# Patient Record
Sex: FEMALE | Race: WHITE | Employment: FULL TIME | URBAN - METROPOLITAN AREA
[De-identification: names, ages, dates, MRNs, and addresses within clinical notes are randomized per-mention and may not be internally consistent; named-entity substitution may affect disease eponyms.]

---

## 2020-02-26 ENCOUNTER — HOSPITAL ENCOUNTER (OUTPATIENT)
Dept: NUCLEAR MEDICINE | Age: 65
Discharge: HOME | End: 2020-02-26
Payer: COMMERCIAL

## 2020-02-26 ENCOUNTER — HOSPITAL ENCOUNTER (OUTPATIENT)
Dept: NON INVASIVE DIAGNOSTICS | Age: 65
Discharge: HOME | End: 2020-02-26
Payer: COMMERCIAL

## 2020-02-26 LAB
LV EF: 64 %
LV EF: 71 %
LVEF MODALITY: NORMAL
LVEF MODALITY: NORMAL

## 2020-02-26 PROCEDURE — 3430000000 HC RX DIAGNOSTIC RADIOPHARMACEUTICAL: Performed by: RADIOLOGY

## 2020-02-26 PROCEDURE — 78452 HT MUSCLE IMAGE SPECT MULT: CPT

## 2020-02-26 PROCEDURE — A9500 TC99M SESTAMIBI: HCPCS | Performed by: RADIOLOGY

## 2020-02-26 PROCEDURE — 93017 CV STRESS TEST TRACING ONLY: CPT

## 2020-02-26 PROCEDURE — 93306 TTE W/DOPPLER COMPLETE: CPT

## 2020-02-26 RX ADMIN — Medication 30 MILLICURIE: at 13:38

## 2020-02-26 RX ADMIN — Medication 10 MILLICURIE: at 11:01

## 2024-11-01 ENCOUNTER — APPOINTMENT (OUTPATIENT)
Dept: CARDIOLOGY | Facility: HOSPITAL | Age: 69
End: 2024-11-01
Payer: MEDICARE

## 2024-11-01 ENCOUNTER — APPOINTMENT (OUTPATIENT)
Dept: RADIOLOGY | Facility: HOSPITAL | Age: 69
End: 2024-11-01
Payer: MEDICARE

## 2024-11-01 ENCOUNTER — HOSPITAL ENCOUNTER (EMERGENCY)
Facility: HOSPITAL | Age: 69
Discharge: HOME | End: 2024-11-01
Attending: EMERGENCY MEDICINE
Payer: MEDICARE

## 2024-11-01 VITALS
OXYGEN SATURATION: 99 % | TEMPERATURE: 98.3 F | WEIGHT: 104 LBS | HEIGHT: 64 IN | DIASTOLIC BLOOD PRESSURE: 81 MMHG | RESPIRATION RATE: 18 BRPM | BODY MASS INDEX: 17.75 KG/M2 | SYSTOLIC BLOOD PRESSURE: 117 MMHG | HEART RATE: 75 BPM

## 2024-11-01 DIAGNOSIS — E87.6 HYPOKALEMIA: ICD-10-CM

## 2024-11-01 DIAGNOSIS — R20.2 PARESTHESIA: ICD-10-CM

## 2024-11-01 DIAGNOSIS — E86.0 DEHYDRATION, MODERATE: Primary | ICD-10-CM

## 2024-11-01 LAB
ALBUMIN SERPL BCP-MCNC: 4.2 G/DL (ref 3.4–5)
ALP SERPL-CCNC: 76 U/L (ref 33–136)
ALT SERPL W P-5'-P-CCNC: 14 U/L (ref 7–45)
ANION GAP SERPL CALC-SCNC: 14 MMOL/L (ref 10–20)
APPEARANCE UR: CLEAR
AST SERPL W P-5'-P-CCNC: 25 U/L (ref 9–39)
BASOPHILS # BLD AUTO: 0.04 X10*3/UL (ref 0–0.1)
BASOPHILS NFR BLD AUTO: 0.5 %
BILIRUB SERPL-MCNC: 1.2 MG/DL (ref 0–1.2)
BILIRUB UR STRIP.AUTO-MCNC: NEGATIVE MG/DL
BNP SERPL-MCNC: 111 PG/ML (ref 0–99)
BUN SERPL-MCNC: 5 MG/DL (ref 6–23)
CALCIUM SERPL-MCNC: 9.6 MG/DL (ref 8.6–10.3)
CARDIAC TROPONIN I PNL SERPL HS: 6 NG/L (ref 0–13)
CARDIAC TROPONIN I PNL SERPL HS: 6 NG/L (ref 0–13)
CHLORIDE SERPL-SCNC: 103 MMOL/L (ref 98–107)
CO2 SERPL-SCNC: 22 MMOL/L (ref 21–32)
COLOR UR: NORMAL
CREAT SERPL-MCNC: 0.62 MG/DL (ref 0.5–1.05)
D DIMER PPP FEU-MCNC: 518 NG/ML FEU
EGFRCR SERPLBLD CKD-EPI 2021: >90 ML/MIN/1.73M*2
EOSINOPHIL # BLD AUTO: 0.04 X10*3/UL (ref 0–0.7)
EOSINOPHIL NFR BLD AUTO: 0.5 %
ERYTHROCYTE [DISTWIDTH] IN BLOOD BY AUTOMATED COUNT: 13.9 % (ref 11.5–14.5)
FLUAV RNA RESP QL NAA+PROBE: NOT DETECTED
FLUBV RNA RESP QL NAA+PROBE: NOT DETECTED
GLUCOSE SERPL-MCNC: 118 MG/DL (ref 74–99)
GLUCOSE UR STRIP.AUTO-MCNC: NORMAL MG/DL
HCT VFR BLD AUTO: 41.1 % (ref 36–46)
HGB BLD-MCNC: 14.6 G/DL (ref 12–16)
IMM GRANULOCYTES # BLD AUTO: 0.03 X10*3/UL (ref 0–0.7)
IMM GRANULOCYTES NFR BLD AUTO: 0.3 % (ref 0–0.9)
KETONES UR STRIP.AUTO-MCNC: NEGATIVE MG/DL
LACTATE SERPL-SCNC: 1.4 MMOL/L (ref 0.4–2)
LEUKOCYTE ESTERASE UR QL STRIP.AUTO: NEGATIVE
LIPASE SERPL-CCNC: 24 U/L (ref 9–82)
LYMPHOCYTES # BLD AUTO: 2.4 X10*3/UL (ref 1.2–4.8)
LYMPHOCYTES NFR BLD AUTO: 27.1 %
MAGNESIUM SERPL-MCNC: 1.79 MG/DL (ref 1.6–2.4)
MCH RBC QN AUTO: 33 PG (ref 26–34)
MCHC RBC AUTO-ENTMCNC: 35.5 G/DL (ref 32–36)
MCV RBC AUTO: 93 FL (ref 80–100)
MONOCYTES # BLD AUTO: 1.02 X10*3/UL (ref 0.1–1)
MONOCYTES NFR BLD AUTO: 11.5 %
NEUTROPHILS # BLD AUTO: 5.32 X10*3/UL (ref 1.2–7.7)
NEUTROPHILS NFR BLD AUTO: 60.1 %
NITRITE UR QL STRIP.AUTO: NEGATIVE
NRBC BLD-RTO: 0 /100 WBCS (ref 0–0)
PH UR STRIP.AUTO: 7 [PH]
PLATELET # BLD AUTO: 215 X10*3/UL (ref 150–450)
POTASSIUM SERPL-SCNC: 2.9 MMOL/L (ref 3.5–5.3)
PROT SERPL-MCNC: 7.1 G/DL (ref 6.4–8.2)
PROT UR STRIP.AUTO-MCNC: NEGATIVE MG/DL
RBC # BLD AUTO: 4.42 X10*6/UL (ref 4–5.2)
RBC # UR STRIP.AUTO: NEGATIVE /UL
RSV RNA RESP QL NAA+PROBE: NOT DETECTED
SARS-COV-2 RNA RESP QL NAA+PROBE: NOT DETECTED
SODIUM SERPL-SCNC: 136 MMOL/L (ref 136–145)
SP GR UR STRIP.AUTO: 1.01
UROBILINOGEN UR STRIP.AUTO-MCNC: NORMAL MG/DL
WBC # BLD AUTO: 8.9 X10*3/UL (ref 4.4–11.3)

## 2024-11-01 PROCEDURE — 83605 ASSAY OF LACTIC ACID: CPT | Performed by: NURSE PRACTITIONER

## 2024-11-01 PROCEDURE — 80053 COMPREHEN METABOLIC PANEL: CPT | Performed by: NURSE PRACTITIONER

## 2024-11-01 PROCEDURE — 94640 AIRWAY INHALATION TREATMENT: CPT

## 2024-11-01 PROCEDURE — 96365 THER/PROPH/DIAG IV INF INIT: CPT

## 2024-11-01 PROCEDURE — 2500000002 HC RX 250 W HCPCS SELF ADMINISTERED DRUGS (ALT 637 FOR MEDICARE OP, ALT 636 FOR OP/ED): Performed by: NURSE PRACTITIONER

## 2024-11-01 PROCEDURE — 2500000004 HC RX 250 GENERAL PHARMACY W/ HCPCS (ALT 636 FOR OP/ED)

## 2024-11-01 PROCEDURE — 81003 URINALYSIS AUTO W/O SCOPE: CPT | Performed by: NURSE PRACTITIONER

## 2024-11-01 PROCEDURE — 96366 THER/PROPH/DIAG IV INF ADDON: CPT

## 2024-11-01 PROCEDURE — 83735 ASSAY OF MAGNESIUM: CPT | Performed by: NURSE PRACTITIONER

## 2024-11-01 PROCEDURE — 87637 SARSCOV2&INF A&B&RSV AMP PRB: CPT | Performed by: NURSE PRACTITIONER

## 2024-11-01 PROCEDURE — 99285 EMERGENCY DEPT VISIT HI MDM: CPT | Mod: 25

## 2024-11-01 PROCEDURE — 85379 FIBRIN DEGRADATION QUANT: CPT | Performed by: NURSE PRACTITIONER

## 2024-11-01 PROCEDURE — 70450 CT HEAD/BRAIN W/O DYE: CPT

## 2024-11-01 PROCEDURE — 83690 ASSAY OF LIPASE: CPT | Performed by: NURSE PRACTITIONER

## 2024-11-01 PROCEDURE — 96361 HYDRATE IV INFUSION ADD-ON: CPT

## 2024-11-01 PROCEDURE — 85025 COMPLETE CBC W/AUTO DIFF WBC: CPT | Performed by: NURSE PRACTITIONER

## 2024-11-01 PROCEDURE — 83880 ASSAY OF NATRIURETIC PEPTIDE: CPT | Performed by: NURSE PRACTITIONER

## 2024-11-01 PROCEDURE — 84484 ASSAY OF TROPONIN QUANT: CPT | Performed by: NURSE PRACTITIONER

## 2024-11-01 PROCEDURE — 2500000001 HC RX 250 WO HCPCS SELF ADMINISTERED DRUGS (ALT 637 FOR MEDICARE OP)

## 2024-11-01 PROCEDURE — 2500000004 HC RX 250 GENERAL PHARMACY W/ HCPCS (ALT 636 FOR OP/ED): Performed by: NURSE PRACTITIONER

## 2024-11-01 PROCEDURE — 93005 ELECTROCARDIOGRAM TRACING: CPT

## 2024-11-01 PROCEDURE — 2500000001 HC RX 250 WO HCPCS SELF ADMINISTERED DRUGS (ALT 637 FOR MEDICARE OP): Performed by: NURSE PRACTITIONER

## 2024-11-01 PROCEDURE — 36415 COLL VENOUS BLD VENIPUNCTURE: CPT | Performed by: NURSE PRACTITIONER

## 2024-11-01 PROCEDURE — 71046 X-RAY EXAM CHEST 2 VIEWS: CPT

## 2024-11-01 PROCEDURE — 71046 X-RAY EXAM CHEST 2 VIEWS: CPT | Performed by: RADIOLOGY

## 2024-11-01 PROCEDURE — 70450 CT HEAD/BRAIN W/O DYE: CPT | Performed by: RADIOLOGY

## 2024-11-01 PROCEDURE — 96375 TX/PRO/DX INJ NEW DRUG ADDON: CPT

## 2024-11-01 RX ORDER — POTASSIUM CHLORIDE 14.9 MG/ML
20 INJECTION INTRAVENOUS
Status: COMPLETED | OUTPATIENT
Start: 2024-11-01 | End: 2024-11-01

## 2024-11-01 RX ORDER — KETOROLAC TROMETHAMINE 30 MG/ML
INJECTION, SOLUTION INTRAMUSCULAR; INTRAVENOUS
Status: COMPLETED
Start: 2024-11-01 | End: 2024-11-01

## 2024-11-01 RX ORDER — POTASSIUM CHLORIDE 750 MG/1
20 TABLET, FILM COATED, EXTENDED RELEASE ORAL ONCE
Status: DISCONTINUED | OUTPATIENT
Start: 2024-11-01 | End: 2024-11-01

## 2024-11-01 RX ORDER — POTASSIUM CHLORIDE 1.5 G/1.58G
20 POWDER, FOR SOLUTION ORAL ONCE
Status: COMPLETED | OUTPATIENT
Start: 2024-11-01 | End: 2024-11-01

## 2024-11-01 RX ORDER — POTASSIUM CHLORIDE 1.5 G/1.58G
POWDER, FOR SOLUTION ORAL
Status: COMPLETED
Start: 2024-11-01 | End: 2024-11-01

## 2024-11-01 RX ORDER — KETOROLAC TROMETHAMINE 30 MG/ML
15 INJECTION, SOLUTION INTRAMUSCULAR; INTRAVENOUS ONCE
Status: COMPLETED | OUTPATIENT
Start: 2024-11-01 | End: 2024-11-01

## 2024-11-01 RX ORDER — FAMOTIDINE 10 MG/ML
20 INJECTION INTRAVENOUS ONCE
Status: COMPLETED | OUTPATIENT
Start: 2024-11-01 | End: 2024-11-01

## 2024-11-01 RX ORDER — ACETAMINOPHEN 160 MG/5ML
975 SUSPENSION ORAL ONCE
Status: COMPLETED | OUTPATIENT
Start: 2024-11-01 | End: 2024-11-01

## 2024-11-01 RX ORDER — IPRATROPIUM BROMIDE AND ALBUTEROL SULFATE 2.5; .5 MG/3ML; MG/3ML
3 SOLUTION RESPIRATORY (INHALATION) ONCE
Status: COMPLETED | OUTPATIENT
Start: 2024-11-01 | End: 2024-11-01

## 2024-11-01 ASSESSMENT — LIFESTYLE VARIABLES
TOTAL SCORE: 0
HAVE PEOPLE ANNOYED YOU BY CRITICIZING YOUR DRINKING: NO
EVER FELT BAD OR GUILTY ABOUT YOUR DRINKING: NO
HAVE YOU EVER FELT YOU SHOULD CUT DOWN ON YOUR DRINKING: NO
EVER HAD A DRINK FIRST THING IN THE MORNING TO STEADY YOUR NERVES TO GET RID OF A HANGOVER: NO

## 2024-11-01 ASSESSMENT — PAIN SCALES - GENERAL
PAINLEVEL_OUTOF10: 10 - WORST POSSIBLE PAIN
PAINLEVEL_OUTOF10: 4
PAINLEVEL_OUTOF10: 10 - WORST POSSIBLE PAIN
PAINLEVEL_OUTOF10: 7

## 2024-11-01 ASSESSMENT — COLUMBIA-SUICIDE SEVERITY RATING SCALE - C-SSRS
2. HAVE YOU ACTUALLY HAD ANY THOUGHTS OF KILLING YOURSELF?: NO
6. HAVE YOU EVER DONE ANYTHING, STARTED TO DO ANYTHING, OR PREPARED TO DO ANYTHING TO END YOUR LIFE?: NO
1. IN THE PAST MONTH, HAVE YOU WISHED YOU WERE DEAD OR WISHED YOU COULD GO TO SLEEP AND NOT WAKE UP?: NO

## 2024-11-01 ASSESSMENT — VISUAL ACUITY: OU: 1

## 2024-11-01 ASSESSMENT — PAIN - FUNCTIONAL ASSESSMENT: PAIN_FUNCTIONAL_ASSESSMENT: 0-10

## 2024-11-02 LAB — HOLD SPECIMEN: NORMAL

## 2024-11-03 LAB
ATRIAL RATE: 82 BPM
P AXIS: 66 DEGREES
PR INTERVAL: 145 MS
Q ONSET: 255 MS
QRS COUNT: 14 BEATS
QRS DURATION: 115 MS
QT INTERVAL: 374 MS
QTC CALCULATION(BAZETT): 440 MS
QTC FREDERICIA: 417 MS
R AXIS: -46 DEGREES
T AXIS: -33 DEGREES
T OFFSET: 442 MS
VENTRICULAR RATE: 83 BPM

## 2024-12-02 ENCOUNTER — APPOINTMENT (OUTPATIENT)
Dept: PRIMARY CARE | Facility: CLINIC | Age: 69
End: 2024-12-02
Payer: MEDICARE

## 2024-12-02 ENCOUNTER — LAB (OUTPATIENT)
Dept: LAB | Facility: LAB | Age: 69
End: 2024-12-02
Payer: MEDICARE

## 2024-12-02 VITALS
BODY MASS INDEX: 16.49 KG/M2 | DIASTOLIC BLOOD PRESSURE: 75 MMHG | WEIGHT: 96.6 LBS | TEMPERATURE: 98.2 F | SYSTOLIC BLOOD PRESSURE: 118 MMHG | HEIGHT: 64 IN | HEART RATE: 101 BPM | RESPIRATION RATE: 16 BRPM | OXYGEN SATURATION: 96 %

## 2024-12-02 DIAGNOSIS — I10 BENIGN ESSENTIAL HYPERTENSION: ICD-10-CM

## 2024-12-02 DIAGNOSIS — Z12.31 SCREENING MAMMOGRAM FOR BREAST CANCER: Primary | ICD-10-CM

## 2024-12-02 DIAGNOSIS — R05.1 ACUTE COUGH: ICD-10-CM

## 2024-12-02 DIAGNOSIS — R73.01 IFG (IMPAIRED FASTING GLUCOSE): ICD-10-CM

## 2024-12-02 DIAGNOSIS — I73.9 PAD (PERIPHERAL ARTERY DISEASE) (CMS-HCC): ICD-10-CM

## 2024-12-02 DIAGNOSIS — Z12.11 COLON CANCER SCREENING: ICD-10-CM

## 2024-12-02 DIAGNOSIS — J44.9 CHRONIC OBSTRUCTIVE PULMONARY DISEASE, UNSPECIFIED COPD TYPE (MULTI): ICD-10-CM

## 2024-12-02 DIAGNOSIS — Z13.220 SCREENING, LIPID: ICD-10-CM

## 2024-12-02 DIAGNOSIS — R53.82 CHRONIC FATIGUE: ICD-10-CM

## 2024-12-02 DIAGNOSIS — I73.9 CLAUDICATION (CMS-HCC): ICD-10-CM

## 2024-12-02 DIAGNOSIS — R63.4 WEIGHT LOSS: ICD-10-CM

## 2024-12-02 DIAGNOSIS — E87.6 HYPOKALEMIA: ICD-10-CM

## 2024-12-02 DIAGNOSIS — Z78.0 ENCOUNTER FOR OSTEOPOROSIS SCREENING IN ASYMPTOMATIC POSTMENOPAUSAL PATIENT: ICD-10-CM

## 2024-12-02 DIAGNOSIS — Z13.820 ENCOUNTER FOR OSTEOPOROSIS SCREENING IN ASYMPTOMATIC POSTMENOPAUSAL PATIENT: ICD-10-CM

## 2024-12-02 DIAGNOSIS — E03.9 HYPOTHYROIDISM, UNSPECIFIED TYPE: ICD-10-CM

## 2024-12-02 LAB
ALBUMIN SERPL BCP-MCNC: 4 G/DL (ref 3.4–5)
ALP SERPL-CCNC: 87 U/L (ref 33–136)
ALT SERPL W P-5'-P-CCNC: 21 U/L (ref 7–45)
ANION GAP SERPL CALC-SCNC: 13 MMOL/L (ref 10–20)
AST SERPL W P-5'-P-CCNC: 39 U/L (ref 9–39)
BASOPHILS # BLD AUTO: 0.04 X10*3/UL (ref 0–0.1)
BASOPHILS NFR BLD AUTO: 0.5 %
BILIRUB SERPL-MCNC: 0.9 MG/DL (ref 0–1.2)
BUN SERPL-MCNC: 7 MG/DL (ref 6–23)
CALCIUM SERPL-MCNC: 9.3 MG/DL (ref 8.6–10.3)
CHLORIDE SERPL-SCNC: 101 MMOL/L (ref 98–107)
CHOLEST SERPL-MCNC: 225 MG/DL (ref 0–199)
CHOLESTEROL/HDL RATIO: 4.4
CO2 SERPL-SCNC: 28 MMOL/L (ref 21–32)
CREAT SERPL-MCNC: 0.67 MG/DL (ref 0.5–1.05)
EGFRCR SERPLBLD CKD-EPI 2021: >90 ML/MIN/1.73M*2
EOSINOPHIL # BLD AUTO: 0.04 X10*3/UL (ref 0–0.7)
EOSINOPHIL NFR BLD AUTO: 0.5 %
ERYTHROCYTE [DISTWIDTH] IN BLOOD BY AUTOMATED COUNT: 14.6 % (ref 11.5–14.5)
ERYTHROCYTE [SEDIMENTATION RATE] IN BLOOD BY WESTERGREN METHOD: 6 MM/H (ref 0–30)
EST. AVERAGE GLUCOSE BLD GHB EST-MCNC: 103 MG/DL
GLUCOSE SERPL-MCNC: 104 MG/DL (ref 74–99)
HBA1C MFR BLD: 5.2 %
HCT VFR BLD AUTO: 41.6 % (ref 36–46)
HDLC SERPL-MCNC: 50.6 MG/DL
HGB BLD-MCNC: 14.2 G/DL (ref 12–16)
IMM GRANULOCYTES # BLD AUTO: 0.01 X10*3/UL (ref 0–0.7)
IMM GRANULOCYTES NFR BLD AUTO: 0.1 % (ref 0–0.9)
LDLC SERPL CALC-MCNC: 151 MG/DL
LYMPHOCYTES # BLD AUTO: 2.73 X10*3/UL (ref 1.2–4.8)
LYMPHOCYTES NFR BLD AUTO: 34.5 %
MCH RBC QN AUTO: 32.9 PG (ref 26–34)
MCHC RBC AUTO-ENTMCNC: 34.1 G/DL (ref 32–36)
MCV RBC AUTO: 96 FL (ref 80–100)
MONOCYTES # BLD AUTO: 1 X10*3/UL (ref 0.1–1)
MONOCYTES NFR BLD AUTO: 12.6 %
NEUTROPHILS # BLD AUTO: 4.1 X10*3/UL (ref 1.2–7.7)
NEUTROPHILS NFR BLD AUTO: 51.8 %
NON HDL CHOLESTEROL: 174 MG/DL (ref 0–149)
NRBC BLD-RTO: 0 /100 WBCS (ref 0–0)
PLATELET # BLD AUTO: 197 X10*3/UL (ref 150–450)
POTASSIUM SERPL-SCNC: 3.5 MMOL/L (ref 3.5–5.3)
PROT SERPL-MCNC: 6.5 G/DL (ref 6.4–8.2)
RBC # BLD AUTO: 4.32 X10*6/UL (ref 4–5.2)
SODIUM SERPL-SCNC: 138 MMOL/L (ref 136–145)
T4 FREE SERPL-MCNC: 0.8 NG/DL (ref 0.61–1.12)
TRIGL SERPL-MCNC: 116 MG/DL (ref 0–149)
TSH SERPL-ACNC: 8.2 MIU/L (ref 0.44–3.98)
URATE SERPL-MCNC: 3.8 MG/DL (ref 2.3–6.7)
VIT B12 SERPL-MCNC: 276 PG/ML (ref 211–911)
VLDL: 23 MG/DL (ref 0–40)
WBC # BLD AUTO: 7.9 X10*3/UL (ref 4.4–11.3)

## 2024-12-02 PROCEDURE — 86038 ANTINUCLEAR ANTIBODIES: CPT

## 2024-12-02 PROCEDURE — 1124F ACP DISCUSS-NO DSCNMKR DOCD: CPT | Performed by: NURSE PRACTITIONER

## 2024-12-02 PROCEDURE — 86235 NUCLEAR ANTIGEN ANTIBODY: CPT

## 2024-12-02 PROCEDURE — 80061 LIPID PANEL: CPT

## 2024-12-02 PROCEDURE — 3078F DIAST BP <80 MM HG: CPT | Performed by: NURSE PRACTITIONER

## 2024-12-02 PROCEDURE — 1159F MED LIST DOCD IN RCRD: CPT | Performed by: NURSE PRACTITIONER

## 2024-12-02 PROCEDURE — 80053 COMPREHEN METABOLIC PANEL: CPT

## 2024-12-02 PROCEDURE — 85025 COMPLETE CBC W/AUTO DIFF WBC: CPT

## 2024-12-02 PROCEDURE — 4004F PT TOBACCO SCREEN RCVD TLK: CPT | Performed by: NURSE PRACTITIONER

## 2024-12-02 PROCEDURE — 3074F SYST BP LT 130 MM HG: CPT | Performed by: NURSE PRACTITIONER

## 2024-12-02 PROCEDURE — 99205 OFFICE O/P NEW HI 60 MIN: CPT | Performed by: NURSE PRACTITIONER

## 2024-12-02 PROCEDURE — 84443 ASSAY THYROID STIM HORMONE: CPT

## 2024-12-02 PROCEDURE — 84439 ASSAY OF FREE THYROXINE: CPT

## 2024-12-02 PROCEDURE — G2211 COMPLEX E/M VISIT ADD ON: HCPCS | Performed by: NURSE PRACTITIONER

## 2024-12-02 PROCEDURE — 85652 RBC SED RATE AUTOMATED: CPT

## 2024-12-02 PROCEDURE — 84550 ASSAY OF BLOOD/URIC ACID: CPT

## 2024-12-02 PROCEDURE — 82607 VITAMIN B-12: CPT

## 2024-12-02 PROCEDURE — 3008F BODY MASS INDEX DOCD: CPT | Performed by: NURSE PRACTITIONER

## 2024-12-02 PROCEDURE — 83036 HEMOGLOBIN GLYCOSYLATED A1C: CPT

## 2024-12-02 PROCEDURE — 86225 DNA ANTIBODY NATIVE: CPT

## 2024-12-02 RX ORDER — ALBUTEROL SULFATE 90 UG/1
2 AEROSOL, METERED RESPIRATORY (INHALATION) EVERY 6 HOURS PRN
COMMUNITY
Start: 2020-03-04 | End: 2024-12-02 | Stop reason: WASHOUT

## 2024-12-02 RX ORDER — METHOCARBAMOL 500 MG/1
500 TABLET, FILM COATED ORAL 4 TIMES DAILY PRN
Qty: 40 TABLET | Refills: 0 | Status: SHIPPED | OUTPATIENT
Start: 2024-12-02 | End: 2025-01-31

## 2024-12-02 RX ORDER — AMLODIPINE BESYLATE 10 MG/1
1 TABLET ORAL DAILY
COMMUNITY
Start: 2020-04-17 | End: 2024-12-02 | Stop reason: WASHOUT

## 2024-12-02 RX ORDER — METOPROLOL SUCCINATE 50 MG/1
1 TABLET, EXTENDED RELEASE ORAL DAILY
COMMUNITY
Start: 2020-04-17 | End: 2024-12-02 | Stop reason: WASHOUT

## 2024-12-02 RX ORDER — LEVOTHYROXINE SODIUM 75 UG/1
1 TABLET ORAL DAILY
COMMUNITY
Start: 2020-04-17

## 2024-12-02 RX ORDER — ASPIRIN 81 MG/1
1 TABLET ORAL DAILY
COMMUNITY
Start: 2020-04-17

## 2024-12-02 ASSESSMENT — ENCOUNTER SYMPTOMS
SORE THROAT: 0
NAUSEA: 0
ACTIVITY CHANGE: 0
NERVOUS/ANXIOUS: 0
FATIGUE: 0
HEADACHES: 0
CONFUSION: 0
NUMBNESS: 1
DYSURIA: 0
COUGH: 1
CHILLS: 0
APNEA: 0
DIARRHEA: 0
VOMITING: 0
WOUND: 0
FEVER: 0
WEAKNESS: 0
DIZZINESS: 1
PALPITATIONS: 0
SHORTNESS OF BREATH: 0
ABDOMINAL PAIN: 0
CONSTITUTIONAL NEGATIVE: 1

## 2024-12-02 NOTE — PROGRESS NOTES
"Subjective   Patient ID: Jaja Bradshaw is a 69 y.o. female who presents for New Patient Visit, COPD, Hypertension, Leg Pain, Abnormal Potassium, and Weight Loss.    69-year-old female patient here to establish care.  Past medical history of hypothyroidism, hypertension.  Recent emergency department visit on 11/1/2024.  Patient presents today with complaint of rapid weight loss of over 20 pounds in last 3 months, facial numbness, bilateral lower extremity pain and numbness and pleuritic chest pain.  Has history of COPD but refuses inhalers.  Chest x-ray unremarkable/no acute cardiopulmonary disease, lab panel showed potassium level of 2.9, slightly elevated BNP at 111 and D-dimer at 518.  COVID, flu, RSV unremarkable.  Normal troponin, EKG and urinalysis.  CT of the head due to left-sided facial pain negative.  Patient treated with IV fluids with the repleted potassium upon discharge.    Dizziness: Started about 3 months ago   BP 's low at home. She stopped metoprolol and amlodipine   Typically 140's /85      Bilateral lower extremity pain: Cold temps of legs   Also feels this sensation in her face     Weight Loss: 30 lbs in 3 months   Not eating d/t the sensation of face.     Hypokalemia: 2.9 at recent ED visit 12/1/24     Bilateral hand lumps/ itching : \"This has been going on for many years\"          Review of Systems   Constitutional: Negative.  Negative for activity change, chills, fatigue and fever.   HENT:  Positive for postnasal drip. Negative for congestion, sneezing and sore throat.    Respiratory:  Positive for cough. Negative for apnea and shortness of breath.    Cardiovascular:  Negative for chest pain and palpitations.   Gastrointestinal:  Negative for abdominal pain, diarrhea, nausea and vomiting.   Genitourinary:  Negative for dysuria and pelvic pain.   Skin:  Negative for rash and wound.   Neurological:  Positive for dizziness and numbness. Negative for weakness and headaches. " "  Psychiatric/Behavioral:  Negative for confusion, self-injury and suicidal ideas. The patient is not nervous/anxious.        Objective   /75   Pulse 101   Temp 36.8 °C (98.2 °F) (Temporal)   Resp 16   Ht 1.626 m (5' 4\")   Wt (!) 43.8 kg (96 lb 9.6 oz)   SpO2 96%   BMI 16.58 kg/m²     Physical Exam  Vitals reviewed.   Constitutional:       Appearance: She is underweight.   HENT:      Head: Normocephalic.   Cardiovascular:      Rate and Rhythm: Normal rate and regular rhythm.      Pulses: Normal pulses.      Heart sounds: Normal heart sounds.   Pulmonary:      Effort: Pulmonary effort is normal. No respiratory distress.      Breath sounds: Normal breath sounds. Decreased air movement present. No wheezing.   Abdominal:      General: Bowel sounds are normal.   Neurological:      General: No focal deficit present.      Mental Status: She is alert and oriented to person, place, and time.   Psychiatric:         Mood and Affect: Mood normal.         Behavior: Behavior normal.         Assessment/Plan   Problem List Items Addressed This Visit             ICD-10-CM    PAD (peripheral artery disease) (CMS-HCC) I73.9     Lifestyle changes can help improve symptoms, especially if you have early peripheral artery disease. Such changes include:    Don't smoke or use tobacco.  Get regular exercise.  Eat a healthy diet.    Referral to vascul   Bilateral arterial duplex ordered         Relevant Medications    methocarbamol (Robaxin) 500 mg tablet    Other Relevant Orders    Referral to Vascular Medicine    Vascular US lower extremity arterial duplex bilateral with DOROTHEA    Sedimentation Rate    Uric acid    ABNER with Reflex to ARSH    Hypokalemia E87.6     Recheck metabolic panel         Benign essential hypertension I10     Off BP meds with stable blood pressure  Check daily and call for consistently elevated BP greater than 140/90  Continue holding amlodipine and metoprolol         Relevant Orders    CBC and Auto " Differential    Comprehensive Metabolic Panel    Albumin-Creatinine Ratio, Urine Random    Sedimentation Rate    Uric acid    ABNER with Reflex to ARSH    Chronic obstructive pulmonary disease (Multi) J44.9     Declines inhalers/patient reports they no longer work  Encouraged to continue having albuterol on hand however         Weight loss R63.4     Screening mammogram ordered, CT of the lung for eval, colon cancer screening/colonoscopy ordered  Recheck fasting lab panel    Recommend boost with each meal, multivitamin daily         Relevant Orders    CT chest wo IV contrast    Hypothyroidism E03.9     TSH ordered  Continue levothyroxine  Follow-up 2 weeks         Relevant Orders    TSH with reflex to Free T4 if abnormal    Screening mammogram for breast cancer - Primary Z12.31     Mammogram for screening ordered and scheduled         Relevant Orders    BI mammo bilateral screening tomosynthesis    Colon cancer screening Z12.11     Screening colonoscopy ordered          Relevant Orders    Colonoscopy Screening; Average Risk Patient    Encounter for osteoporosis screening in asymptomatic postmenopausal patient Z13.820, Z78.0     DEXA ordered            Relevant Orders    XR DEXA bone density    Acute cough R05.1     CT chest ordered   Pro air inhaler prn   Improving            Relevant Orders    CT chest wo IV contrast    Claudication (CMS-HCC) I73.9     Vasc US ordered   Vascular referral   Robaxin prn -risks/ benefits/side effects discussed          Relevant Medications    methocarbamol (Robaxin) 500 mg tablet    Other Relevant Orders    Sedimentation Rate    Uric acid    ABNER with Reflex to ARSH     Other Visit Diagnoses         Codes    Screening, lipid     Z13.220    Relevant Orders    Lipid Panel    IFG (impaired fasting glucose)     R73.01    Relevant Orders    Hemoglobin A1C    Chronic fatigue     R53.82    Relevant Orders    Vitamin D 1,25 Dihydroxy (for eval of hypercalcemia)    Vitamin B12

## 2024-12-02 NOTE — ASSESSMENT & PLAN NOTE
Lifestyle changes can help improve symptoms, especially if you have early peripheral artery disease. Such changes include:    Don't smoke or use tobacco.  Get regular exercise.  Eat a healthy diet.    Referral to vascul   Bilateral arterial duplex ordered

## 2024-12-02 NOTE — ASSESSMENT & PLAN NOTE
Declines inhalers/patient reports they no longer work  Encouraged to continue having albuterol on hand however

## 2024-12-02 NOTE — ASSESSMENT & PLAN NOTE
"5/8/2020       RE: Belkis Fang  [address]  [facility] [address]     Dear Colleague,    Thank you for referring your patient, Belkis Fang, to the [facility]. Please see a copy of my visit note below.    Belkis Fang is a 67 year old female who is being evaluated via a billable video visit.      The patient has been notified of following:     \"This video visit will be conducted via a call between you and your physician/provider. We have found that certain health care needs can be provided without the need for an in-person physical exam.  This service lets us provide the care you need with a video conversation.  If a prescription is necessary we can send it directly to your pharmacy.  If lab work is needed we can place an order for that and you can then stop by our lab to have the test done at a later time.    Video visits are billed at different rates depending on your insurance coverage.  Please reach out to your insurance provider with any questions.    If during the course of the call the physician/provider feels a video visit is not appropriate, you will not be charged for this service.\"    Patient has given verbal consent for Video visit? Yes    How would you like to obtain your AVS? MyChart    Patient would like the video invitation sent by: Text to cell phone: [phone]    Will anyone else be joining your video visit? No           Secondary Video Option (Doximity), send text message to:  [phone]    I have reviewed and updated the patient's allergies and medication list.    Concerns: Patient has no new concerns.      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 This was ER, OH and HER2 positive.  - Ms. Fang had a bilateral screening mammogram on 03/20/2015. This showed calcification and a focal asymmetry in the left breast. She had a left diagnostic " Screening colonoscopy ordered    mammogram and a left breast ultrasound on 03/30/2015. Mammogram confirmed the calcification. Ultrasound showed a hypoechoic mass of 9 x 7 x7 mm on the left side. Contrast-enhancing mammogram on 04/02/2015 showed a left breast mass of 1 x 1.5 cm which was enhancing. Biopsy on the same day showed a mucinous colloid intraductal carcinoma. ER, MO and HER2 were positive.   - She had a left lumpectomy and sentinel lymph node biopsy on 04/22/2015. This showed a 1.0-cm tumor, invasive mucinous carcinoma, Wat[place] grade 1. There was DCIS. Margins were clear for invasive cancer, but positive for DCIS. Zero of 1 lymph nodes were positive. She was diagnosed with a stage I (T1 N0 M0) left breast cancer. She received TCH (Taxotere, carboplatin, Herceptin) x6 cycles from 05/13/2015 until 08/25/2015. She remains on the Herceptin, restarted on 09/16/2015, and completed one year in April 2016.   - She had a left mastectomy on 10/05/2015 which was positive for DCIS, but no invasive cancer. She began Arimidex in 11/2015 and discontinued this in 01/2017 due to arthralgias.  Aromasin was started in 01/2017    Interval History: Belkis is here for a routine 6 month breast cancer follow-up done virtually in the setting of COVID-19 pandemic. She is feeling well and has no concerns today. She is retired now so life has not changed a whole lot in the pandemic. She and her family have been feeling well and not having any concerning symptoms of fevers/chills or infection. She has been exercising routinely with riding her bike and walking and has no SOB or CP with this. She has been eating healthier with eating out less. She continues exemestane and has no significant side effects with this besides some joint pains in the morning which resolve with movement. No new or different pain. No headaches. No bowel difficulties. She wonders how long she will be on exemestane.     Current Outpatient Medications   Medication     citalopram (CELEXA) 10 MG  "tablet     diclofenac (VOLTAREN) 1 % topical gel     exemestane (AROMASIN) 25 MG tablet     glucosamine-chondroitin 500-400 MG CAPS     Multiple Vitamin (MULTIVITAMINS PO)     ORDER FOR DME     Pyridoxine HCl (VITAMIN B6 PO)     tiZANidine (ZANAFLEX) 4 MG tablet     Vaginal Lubricant (REPLENS) GEL     VITAMIN D, CHOLECALCIFEROL, PO     No current facility-administered medications for this visit.       No Known Allergies    Past Medical History:   Diagnosis Date     Breast cancer (H)      Depressive disorder      ZEV (obstructive sleep apnea) AHI 17.3 [facility] 3/24/2014    [facility] 3/24/2014     Video physical exam  General: Patient appears well in no acute distress. Obese body habitus.  Skin: No visualized rash or lesions on visualized skin  Eyes: EOMI, no erythema, sclera icterus or discharge noted  Resp: Appears to be breathing comfortably without accessory muscle usage, speaking in full sentences, no cough  MSK: Appears to have normal range of motion based on visualized movements  Neurologic: No apparent tremors, facial movements symmetric  Psych: affect and mood congruent, alert and oriented  The rest of a comprehensive physical examination is deferred due to PHE (public health emergency) video restrictions\"    IMPRESSION/PLAN:   1.  Stage I (T1 N0 M0) invasive mucinous carcinoma, left breast, ER/AZ positive, HER-2 positive.  Ms. Fang continues to do well at this time.  She is not having any signs or symptoms that would suggest recurrence of her breast carcinoma. Reviewed the MA.17R trial results showing 10 years of endocrine therapy was superior to 5 years but the benefit was mostly for women with stage II and stage III breast cancers. Also have to balance the risk to bone health. She is tolerating aromasin well with minimal side effect. She will complete 5 years of therapy 11/2020--will discuss with Dr. Calvert more at next visit. Mammogram was done today. Follow-up with Dr. Calvert in 6 months.      2.  Bone health: " At risk for bone loss on aromatase inhibitor. Last DEXA was 4/2019 showing normal bone density with lowest T-score of -0.5. Continue calcium and vitamin D and weight bearing exercise. Repeat DEXA in one year.     Video-Visit Details    Type of service:  Video Visit    Video Start Time: 2:35  Video End Time: 2:56 pm     Originating Location (pt. Location): Home    Distant Location (provider location):  [facility]     Platform used for Video Visit: [facility]    Zenaida York PA-C          Again, thank you for allowing me to participate in the care of your patient.      Sincerely,    Zenaida York PA-C

## 2024-12-02 NOTE — ASSESSMENT & PLAN NOTE
Off BP meds with stable blood pressure  Check daily and call for consistently elevated BP greater than 140/90  Continue holding amlodipine and metoprolol

## 2024-12-02 NOTE — ASSESSMENT & PLAN NOTE
Screening mammogram ordered, CT of the lung for eval, colon cancer screening/colonoscopy ordered  Recheck fasting lab panel    Recommend boost with each meal, multivitamin daily

## 2024-12-03 LAB
ANA PATTERN: ABNORMAL
ANA SER QL HEP2 SUBST: POSITIVE
ANA TITR SER IF: ABNORMAL {TITER}
CENTROMERE B AB SER-ACNC: <0.2 AI
CHROMATIN AB SERPL-ACNC: <0.2 AI
DSDNA AB SER-ACNC: <1 IU/ML
ENA JO1 AB SER QL IA: <0.2 AI
ENA RNP AB SER IA-ACNC: 0.3 AI
ENA SCL70 AB SER QL IA: <0.2 AI
ENA SM AB SER IA-ACNC: <0.2 AI
ENA SM+RNP AB SER QL IA: <0.2 AI
ENA SS-A AB SER IA-ACNC: <0.2 AI
ENA SS-B AB SER IA-ACNC: <0.2 AI
RIBOSOMAL P AB SER-ACNC: <0.2 AI

## 2024-12-05 LAB — 1,25(OH)2D SERPL-MCNC: 41 PG/ML (ref 19.9–79.3)

## 2024-12-10 ENCOUNTER — APPOINTMENT (OUTPATIENT)
Dept: RADIOLOGY | Facility: HOSPITAL | Age: 69
End: 2024-12-10
Payer: MEDICARE

## 2024-12-16 ENCOUNTER — HOSPITAL ENCOUNTER (OUTPATIENT)
Dept: RADIOLOGY | Facility: CLINIC | Age: 69
Discharge: HOME | End: 2024-12-16
Payer: COMMERCIAL

## 2024-12-16 DIAGNOSIS — Z78.0 ENCOUNTER FOR OSTEOPOROSIS SCREENING IN ASYMPTOMATIC POSTMENOPAUSAL PATIENT: ICD-10-CM

## 2024-12-16 DIAGNOSIS — Z13.820 ENCOUNTER FOR OSTEOPOROSIS SCREENING IN ASYMPTOMATIC POSTMENOPAUSAL PATIENT: ICD-10-CM

## 2024-12-16 PROCEDURE — 77080 DXA BONE DENSITY AXIAL: CPT | Performed by: RADIOLOGY

## 2024-12-16 PROCEDURE — 77080 DXA BONE DENSITY AXIAL: CPT

## 2024-12-17 ENCOUNTER — HOSPITAL ENCOUNTER (OUTPATIENT)
Dept: VASCULAR MEDICINE | Facility: HOSPITAL | Age: 69
Discharge: HOME | End: 2024-12-17
Payer: MEDICARE

## 2024-12-17 DIAGNOSIS — I73.9 PAD (PERIPHERAL ARTERY DISEASE) (CMS-HCC): ICD-10-CM

## 2024-12-17 PROCEDURE — 93922 UPR/L XTREMITY ART 2 LEVELS: CPT

## 2024-12-17 PROCEDURE — 93922 UPR/L XTREMITY ART 2 LEVELS: CPT | Performed by: SURGERY

## 2024-12-19 ENCOUNTER — APPOINTMENT (OUTPATIENT)
Dept: PRIMARY CARE | Facility: CLINIC | Age: 69
End: 2024-12-19
Payer: MEDICARE

## 2024-12-19 VITALS
WEIGHT: 97 LBS | HEART RATE: 86 BPM | SYSTOLIC BLOOD PRESSURE: 125 MMHG | TEMPERATURE: 90.3 F | BODY MASS INDEX: 16.65 KG/M2 | RESPIRATION RATE: 16 BRPM | DIASTOLIC BLOOD PRESSURE: 86 MMHG | OXYGEN SATURATION: 97 %

## 2024-12-19 DIAGNOSIS — E03.9 HYPOTHYROIDISM, UNSPECIFIED TYPE: ICD-10-CM

## 2024-12-19 DIAGNOSIS — E46 PROTEIN-CALORIE MALNUTRITION, UNSPECIFIED SEVERITY (MULTI): ICD-10-CM

## 2024-12-19 DIAGNOSIS — G25.81 RLS (RESTLESS LEGS SYNDROME): ICD-10-CM

## 2024-12-19 DIAGNOSIS — Z00.00 ROUTINE GENERAL MEDICAL EXAMINATION AT HEALTH CARE FACILITY: Primary | ICD-10-CM

## 2024-12-19 PROBLEM — I73.9 CLAUDICATION (CMS-HCC): Status: RESOLVED | Noted: 2024-12-02 | Resolved: 2024-12-19

## 2024-12-19 PROBLEM — I73.9 PAD (PERIPHERAL ARTERY DISEASE) (CMS-HCC): Status: RESOLVED | Noted: 2024-12-02 | Resolved: 2024-12-19

## 2024-12-19 PROCEDURE — 1170F FXNL STATUS ASSESSED: CPT | Performed by: NURSE PRACTITIONER

## 2024-12-19 PROCEDURE — 1124F ACP DISCUSS-NO DSCNMKR DOCD: CPT | Performed by: NURSE PRACTITIONER

## 2024-12-19 PROCEDURE — 3079F DIAST BP 80-89 MM HG: CPT | Performed by: NURSE PRACTITIONER

## 2024-12-19 PROCEDURE — 3074F SYST BP LT 130 MM HG: CPT | Performed by: NURSE PRACTITIONER

## 2024-12-19 PROCEDURE — G0439 PPPS, SUBSEQ VISIT: HCPCS | Performed by: NURSE PRACTITIONER

## 2024-12-19 PROCEDURE — 4004F PT TOBACCO SCREEN RCVD TLK: CPT | Performed by: NURSE PRACTITIONER

## 2024-12-19 RX ORDER — CHOLECALCIFEROL (VITAMIN D3) 25 MCG
2000 TABLET ORAL
COMMUNITY

## 2024-12-19 RX ORDER — GABAPENTIN 100 MG/1
100 CAPSULE ORAL 3 TIMES DAILY
Qty: 90 CAPSULE | Refills: 5 | Status: SHIPPED | OUTPATIENT
Start: 2024-12-19 | End: 2025-06-17

## 2024-12-19 RX ORDER — LEVOTHYROXINE SODIUM 75 UG/1
75 TABLET ORAL DAILY
Qty: 90 TABLET | Refills: 1 | Status: SHIPPED | OUTPATIENT
Start: 2024-12-19 | End: 2025-06-17

## 2024-12-19 RX ORDER — ROPINIROLE 1 MG/1
1 TABLET, FILM COATED ORAL 3 TIMES DAILY
Qty: 270 TABLET | Refills: 1 | Status: SHIPPED | OUTPATIENT
Start: 2024-12-19 | End: 2025-06-17

## 2024-12-19 ASSESSMENT — ENCOUNTER SYMPTOMS
ARTHRALGIAS: 0
WEAKNESS: 0
SHORTNESS OF BREATH: 0
FEVER: 0
CONFUSION: 0
SLEEP DISTURBANCE: 0
NERVOUS/ANXIOUS: 0
APNEA: 0
CHILLS: 0
COUGH: 1
HEADACHES: 0
CONSTITUTIONAL NEGATIVE: 1
DIZZINESS: 0
NAUSEA: 0
ABDOMINAL PAIN: 0
VOMITING: 0
SORE THROAT: 0
PALPITATIONS: 0
MYALGIAS: 0
ACTIVITY CHANGE: 0
SPEECH DIFFICULTY: 0

## 2024-12-19 ASSESSMENT — ACTIVITIES OF DAILY LIVING (ADL)
BATHING: INDEPENDENT
MANAGING_FINANCES: INDEPENDENT
TAKING_MEDICATION: INDEPENDENT
DRESSING: INDEPENDENT
DOING_HOUSEWORK: INDEPENDENT
GROCERY_SHOPPING: NEEDS ASSISTANCE

## 2024-12-19 ASSESSMENT — PATIENT HEALTH QUESTIONNAIRE - PHQ9
2. FEELING DOWN, DEPRESSED OR HOPELESS: NOT AT ALL
SUM OF ALL RESPONSES TO PHQ9 QUESTIONS 1 AND 2: 0
1. LITTLE INTEREST OR PLEASURE IN DOING THINGS: NOT AT ALL

## 2024-12-19 NOTE — PROGRESS NOTES
Subjective   Reason for Visit: Jaja Bradshaw is an 69 y.o. female here for a Medicare Wellness visit.     Past Medical, Surgical, and Family History reviewed and updated in chart.         Medicare well visit    Hypothyroidism: Patient on levothyroxine 75 mcg once daily.  Has been out of med for 2 weeks  TSH 8.20  Asymptomatic    Peripheral vascular disease: Vascular ABIs unremarkable.  Has appointment with vascular medicine 3/2025.  At this time no findings of any vascular deficiency.  Patient is a heavy smoker and is complaining of lower extremity leg pain.  Coolness to lower extremities.  Reports she kicks her legs at night due to increasing pain    Hypertension: Well-managed off med.  Current blood pressure 125/86  Asymptomatic    Osteopenia: DEXA scan reviewed.  Currently taking vitamin D 1000 mcg daily.  Does not take any calcium.  Discussed T-scores            Patient Care Team:  SARAH Norris-CNP as PCP - General (Family Medicine)  Sebastián Rogers DO as PCP - [employer] PCP     Review of Systems   Constitutional: Negative.  Negative for activity change, chills and fever.   HENT:  Negative for congestion, postnasal drip, sneezing and sore throat.    Respiratory:  Positive for cough. Negative for apnea and shortness of breath.         Chronic smokers cough   Cardiovascular:  Negative for chest pain, palpitations and leg swelling.   Gastrointestinal:  Negative for abdominal pain, nausea and vomiting.   Musculoskeletal:  Negative for arthralgias and myalgias.        Bilateral lower extremity pain   Neurological:  Negative for dizziness, syncope, speech difficulty, weakness and headaches.   Psychiatric/Behavioral:  Negative for confusion and sleep disturbance. The patient is not nervous/anxious.        Objective   Vitals:  /86 (BP Location: Right arm, Patient Position: Sitting, BP Cuff Size: Small adult)   Pulse 86   Temp (!) 32.4 °C (90.3 °F) (Temporal)   Resp 16   Wt (!) 44 kg  (97 lb)   SpO2 97%   BMI 16.65 kg/m²       Physical Exam  Vitals reviewed.   Constitutional:       Appearance: Normal appearance.   HENT:      Head: Normocephalic.   Cardiovascular:      Rate and Rhythm: Normal rate and regular rhythm.      Pulses: Normal pulses.      Heart sounds: Normal heart sounds.   Pulmonary:      Effort: Pulmonary effort is normal. No respiratory distress.      Breath sounds: Normal breath sounds. No wheezing.   Abdominal:      General: Bowel sounds are normal.   Neurological:      General: No focal deficit present.      Mental Status: She is alert and oriented to person, place, and time.   Psychiatric:         Mood and Affect: Mood normal.         Behavior: Behavior normal.         Assessment & Plan  Routine general medical examination at health care facility  Medicare well visit completed  Orders:    1 Year Follow Up In Primary Care - Wellness Exam; Future    Hypothyroidism, unspecified type  Refill provided for levothyroxine 75 mcg  Recheck 6 weeks with TSH    Orders:    levothyroxine (Synthroid, Levoxyl) 75 mcg tablet; Take 1 tablet (75 mcg) by mouth once daily.    Tsh With Reflex To Free T4 If Abnormal; Future    RLS (restless legs syndrome)  Start Requip 1 mg 3 times daily and gabapentin 100 mg 3 times daily  Risk/benefits/side effects discussed    Orders:    rOPINIRole (Requip) 1 mg tablet; Take 1 tablet (1 mg) by mouth 3 times a day.    gabapentin (Neurontin) 100 mg capsule; Take 1 capsule (100 mg) by mouth 3 times a day.    Protein-calorie malnutrition, unspecified severity (Multi)  Has not completed recommended CT chest, declines completing mammogram however ordered at last visit  CBC unremarkable  Advised smoking cessation  Recommend multivitamin with minerals daily  Control boost with each meal  Restart levothyroxine 75 mcg and recheck in 6 weeks  Fortified foods with each meal

## 2024-12-19 NOTE — ASSESSMENT & PLAN NOTE
Refill provided for levothyroxine 75 mcg  Recheck 6 weeks with TSH    Orders:    levothyroxine (Synthroid, Levoxyl) 75 mcg tablet; Take 1 tablet (75 mcg) by mouth once daily.    Tsh With Reflex To Free T4 If Abnormal; Future

## 2024-12-19 NOTE — ASSESSMENT & PLAN NOTE
Has not completed recommended CT chest, declines completing mammogram however ordered at last visit  CBC unremarkable  Advised smoking cessation  Recommend multivitamin with minerals daily  Control boost with each meal  Restart levothyroxine 75 mcg and recheck in 6 weeks  Fortified foods with each meal

## 2024-12-19 NOTE — ASSESSMENT & PLAN NOTE
Start Requip 1 mg 3 times daily and gabapentin 100 mg 3 times daily  Risk/benefits/side effects discussed    Orders:    rOPINIRole (Requip) 1 mg tablet; Take 1 tablet (1 mg) by mouth 3 times a day.    gabapentin (Neurontin) 100 mg capsule; Take 1 capsule (100 mg) by mouth 3 times a day.

## 2024-12-19 NOTE — ASSESSMENT & PLAN NOTE
Medicare well visit completed  Orders:    1 Year Follow Up In Primary Care - Wellness Exam; Future

## 2025-01-01 ENCOUNTER — ANESTHESIA EVENT (OUTPATIENT)
Dept: OPERATING ROOM | Facility: HOSPITAL | Age: 70
DRG: 329 | End: 2025-01-01
Payer: MEDICARE

## 2025-01-01 ENCOUNTER — SURGERY (OUTPATIENT)
Age: 70
End: 2025-01-01
Payer: MEDICARE

## 2025-01-01 ENCOUNTER — APPOINTMENT (OUTPATIENT)
Dept: RADIOLOGY | Facility: HOSPITAL | Age: 70
DRG: 329 | End: 2025-01-01
Payer: MEDICARE

## 2025-01-01 ENCOUNTER — ANESTHESIA (OUTPATIENT)
Dept: OPERATING ROOM | Facility: HOSPITAL | Age: 70
DRG: 329 | End: 2025-01-01
Payer: MEDICARE

## 2025-01-01 PROCEDURE — 71045 X-RAY EXAM CHEST 1 VIEW: CPT

## 2025-01-01 PROCEDURE — 2500000004 HC RX 250 GENERAL PHARMACY W/ HCPCS (ALT 636 FOR OP/ED): Performed by: NURSE ANESTHETIST, CERTIFIED REGISTERED

## 2025-01-01 PROCEDURE — 74177 CT ABD & PELVIS W/CONTRAST: CPT

## 2025-01-01 PROCEDURE — 2500000005 HC RX 250 GENERAL PHARMACY W/O HCPCS: Performed by: NURSE ANESTHETIST, CERTIFIED REGISTERED

## 2025-01-01 PROCEDURE — P9047 ALBUMIN (HUMAN), 25%, 50ML: HCPCS | Performed by: NURSE ANESTHETIST, CERTIFIED REGISTERED

## 2025-01-01 RX ORDER — PHENYLEPHRINE 10 MG/250 ML(40 MCG/ML)IN 0.9 % SOD.CHLORIDE INTRAVENOUS
CONTINUOUS PRN
Status: DISCONTINUED | OUTPATIENT
Start: 2025-01-01 | End: 2025-01-01

## 2025-01-01 RX ORDER — FENTANYL CITRATE 50 UG/ML
INJECTION, SOLUTION INTRAMUSCULAR; INTRAVENOUS AS NEEDED
Status: DISCONTINUED | OUTPATIENT
Start: 2025-01-01 | End: 2025-01-01

## 2025-01-01 RX ORDER — LIDOCAINE HYDROCHLORIDE 20 MG/ML
INJECTION, SOLUTION INFILTRATION; PERINEURAL AS NEEDED
Status: DISCONTINUED | OUTPATIENT
Start: 2025-01-01 | End: 2025-01-01

## 2025-01-01 RX ORDER — METRONIDAZOLE 500 MG/100ML
INJECTION, SOLUTION INTRAVENOUS AS NEEDED
Status: DISCONTINUED | OUTPATIENT
Start: 2025-01-01 | End: 2025-01-01

## 2025-01-01 RX ORDER — ROCURONIUM BROMIDE 10 MG/ML
INJECTION, SOLUTION INTRAVENOUS AS NEEDED
Status: DISCONTINUED | OUTPATIENT
Start: 2025-01-01 | End: 2025-01-01

## 2025-01-01 RX ORDER — MIDAZOLAM HYDROCHLORIDE 1 MG/ML
INJECTION, SOLUTION INTRAMUSCULAR; INTRAVENOUS AS NEEDED
Status: DISCONTINUED | OUTPATIENT
Start: 2025-01-01 | End: 2025-01-01

## 2025-01-01 RX ORDER — PROPOFOL 10 MG/ML
INJECTION, EMULSION INTRAVENOUS AS NEEDED
Status: DISCONTINUED | OUTPATIENT
Start: 2025-01-01 | End: 2025-01-01

## 2025-01-01 RX ORDER — NOREPINEPHRINE BITARTRATE 0.03 MG/ML
INJECTION, SOLUTION INTRAVENOUS CONTINUOUS PRN
Status: DISCONTINUED | OUTPATIENT
Start: 2025-01-01 | End: 2025-01-01

## 2025-01-01 RX ORDER — PHENYLEPHRINE HCL IN 0.9% NACL 1 MG/10 ML
SYRINGE (ML) INTRAVENOUS AS NEEDED
Status: DISCONTINUED | OUTPATIENT
Start: 2025-01-01 | End: 2025-01-01

## 2025-01-01 RX ORDER — ALBUMIN HUMAN 250 G/1000ML
SOLUTION INTRAVENOUS CONTINUOUS PRN
Status: DISCONTINUED | OUTPATIENT
Start: 2025-01-01 | End: 2025-01-01

## 2025-01-01 RX ADMIN — PHENYLEPHRINE-NACL IV SOLUTION 10 MG/250ML-0.9% 0.6 MCG/KG/MIN: 10-0.9/25 SOLUTION at 00:05

## 2025-01-01 RX ADMIN — ALBUMIN HUMAN: 0.25 SOLUTION INTRAVENOUS at 00:38

## 2025-01-01 RX ADMIN — ALBUMIN HUMAN: 0.25 SOLUTION INTRAVENOUS at 00:59

## 2025-01-01 RX ADMIN — LIDOCAINE HYDROCHLORIDE 100 MG: 20 INJECTION, SOLUTION INFILTRATION; PERINEURAL at 00:00

## 2025-01-01 RX ADMIN — MIDAZOLAM 1 MG: 1 INJECTION INTRAMUSCULAR; INTRAVENOUS at 02:01

## 2025-01-01 RX ADMIN — Medication 0.21 MCG/KG/MIN: at 01:54

## 2025-01-01 RX ADMIN — SODIUM CHLORIDE, POTASSIUM CHLORIDE, SODIUM LACTATE AND CALCIUM CHLORIDE: 600; 310; 30; 20 INJECTION, SOLUTION INTRAVENOUS at 23:56

## 2025-01-01 RX ADMIN — Medication 200 MCG: at 00:04

## 2025-01-01 RX ADMIN — BACITRACIN 1 APPLICATION: 500 OINTMENT TOPICAL at 01:57

## 2025-01-01 RX ADMIN — MIDAZOLAM 1 MG: 1 INJECTION INTRAMUSCULAR; INTRAVENOUS at 00:00

## 2025-01-01 RX ADMIN — FENTANYL CITRATE 50 MCG: 50 INJECTION INTRAMUSCULAR; INTRAVENOUS at 00:00

## 2025-01-01 RX ADMIN — PROPOFOL 40 MG: 10 INJECTION, EMULSION INTRAVENOUS at 00:00

## 2025-01-01 RX ADMIN — FENTANYL CITRATE 25 MCG: 50 INJECTION INTRAMUSCULAR; INTRAVENOUS at 02:01

## 2025-01-01 RX ADMIN — ROCURONIUM BROMIDE 40 MG: 10 INJECTION, SOLUTION INTRAVENOUS at 00:00

## 2025-01-01 RX ADMIN — SODIUM CHLORIDE 14000 ML: 900 IRRIGANT IRRIGATION at 01:50

## 2025-01-01 RX ADMIN — ROCURONIUM BROMIDE 10 MG: 10 INJECTION, SOLUTION INTRAVENOUS at 01:11

## 2025-01-01 RX ADMIN — METRONIDAZOLE 500 MG: 500 INJECTION, SOLUTION INTRAVENOUS at 23:56

## 2025-01-01 RX ADMIN — Medication 200 MCG: at 00:02

## 2025-01-01 RX ADMIN — SUGAMMADEX 200 MG: 100 INJECTION, SOLUTION INTRAVENOUS at 01:58

## 2025-01-01 SDOH — HEALTH STABILITY: MENTAL HEALTH: CURRENT SMOKER: 1

## 2025-01-01 ASSESSMENT — PAIN SCALES - GENERAL: PAIN_LEVEL: 3

## 2025-01-10 ENCOUNTER — HOSPITAL ENCOUNTER (OUTPATIENT)
Dept: RADIOLOGY | Facility: HOSPITAL | Age: 70
Discharge: HOME | End: 2025-01-10
Payer: MEDICARE

## 2025-01-10 DIAGNOSIS — R05.1 ACUTE COUGH: ICD-10-CM

## 2025-01-10 DIAGNOSIS — R63.4 WEIGHT LOSS: ICD-10-CM

## 2025-01-10 PROCEDURE — 71250 CT THORAX DX C-: CPT

## 2025-01-13 ENCOUNTER — LAB (OUTPATIENT)
Dept: LAB | Facility: LAB | Age: 70
End: 2025-01-13
Payer: MEDICARE

## 2025-01-13 DIAGNOSIS — J39.8 TRACHEAL MASS: Primary | ICD-10-CM

## 2025-01-13 DIAGNOSIS — E03.9 HYPOTHYROIDISM, UNSPECIFIED TYPE: ICD-10-CM

## 2025-01-13 LAB
T4 FREE SERPL-MCNC: 1.17 NG/DL (ref 0.61–1.12)
TSH SERPL-ACNC: 13.2 MIU/L (ref 0.44–3.98)

## 2025-01-13 PROCEDURE — 84443 ASSAY THYROID STIM HORMONE: CPT

## 2025-01-13 PROCEDURE — 84439 ASSAY OF FREE THYROXINE: CPT

## 2025-01-21 ENCOUNTER — OFFICE VISIT (OUTPATIENT)
Dept: SURGERY | Facility: CLINIC | Age: 70
End: 2025-01-21
Payer: MEDICARE

## 2025-01-21 VITALS
DIASTOLIC BLOOD PRESSURE: 69 MMHG | WEIGHT: 93.4 LBS | HEART RATE: 105 BPM | HEIGHT: 64 IN | BODY MASS INDEX: 15.95 KG/M2 | TEMPERATURE: 97.9 F | OXYGEN SATURATION: 97 % | SYSTOLIC BLOOD PRESSURE: 133 MMHG

## 2025-01-21 DIAGNOSIS — M79.18 MUSCLE ACHE OF EXTREMITY: Primary | ICD-10-CM

## 2025-01-21 DIAGNOSIS — D38.3 NEOPLASM OF UNCERTAIN BEHAVIOR OF MEDIASTINUM: ICD-10-CM

## 2025-01-21 DIAGNOSIS — J39.8 TRACHEAL MASS: ICD-10-CM

## 2025-01-21 PROCEDURE — 99215 OFFICE O/P EST HI 40 MIN: CPT | Performed by: STUDENT IN AN ORGANIZED HEALTH CARE EDUCATION/TRAINING PROGRAM

## 2025-01-21 RX ORDER — HYDROCODONE BITARTRATE AND ACETAMINOPHEN 5; 325 MG/1; MG/1
1 TABLET ORAL EVERY 6 HOURS PRN
Qty: 25 TABLET | Refills: 0 | Status: SHIPPED | OUTPATIENT
Start: 2025-01-21 | End: 2025-01-28

## 2025-01-21 NOTE — H&P (VIEW-ONLY)
Chief complaint:  Paratracheal lesion    History Of Present Illness  Jaja Bradshaw is a 69 y.o. female, current smoker (0.5ppd x ~50 years), presenting with a right paratracheal lesion. Patient was referred by her PCP, Fide Alonzo.    Patient reports she has been having several generalized issues including diffuse muscle aches which started in her legs but have started to occur all over. She has also had ~30lb weight loss over the last 3-4 months. She does admit that she previously had dentures fitted which she cannot use secondary to discomfort and increased size. She also had previously lost her sense of taste. Between these 2 things she has not wanted to eat nearly as much as she normally would.   She denies any increased SOB, CP, dysphagia, new cough, wheezing, or other.     No history of MI or CVA. Could walk a mile or climb 2 flights of stairs: yes     Past Medical History  She has no past medical history on file.    Surgical History  She has a past surgical history that includes Other surgical history (04/17/2020).     Social History  She reports that she has been smoking cigarettes. She started smoking about 45 years ago. She has a 22.5 pack-year smoking history. She has been exposed to tobacco smoke. She has never used smokeless tobacco. She reports current alcohol use of about 2.0 standard drinks of alcohol per week. She reports that she does not use drugs.    Family History  Family history of cancer: Yes  Family History   Problem Relation Name Age of Onset    Liver cancer Mother          Medications    Current Outpatient Medications:     aspirin 81 mg EC tablet, Take 1 tablet (81 mg) by mouth once daily., Disp: , Rfl:     cholecalciferol (Vitamin D3) 25 MCG (1000 UT) tablet, Take 2 tablets (2,000 Units) by mouth 5 times a day., Disp: , Rfl:     gabapentin (Neurontin) 100 mg capsule, Take 1 capsule (100 mg) by mouth 3 times a day., Disp: 90 capsule, Rfl: 5    levothyroxine (Synthroid, Levoxyl) 75 mcg  "tablet, Take 1 tablet (75 mcg) by mouth once daily., Disp: 90 tablet, Rfl: 1    rOPINIRole (Requip) 1 mg tablet, Take 1 tablet (1 mg) by mouth 3 times a day., Disp: 270 tablet, Rfl: 1    HYDROcodone-acetaminophen (Norco) 5-325 mg tablet, Take 1 tablet by mouth every 6 hours if needed for severe pain (7 - 10) for up to 7 days., Disp: 25 tablet, Rfl: 0    methocarbamol (Robaxin) 500 mg tablet, Take 1 tablet (500 mg) by mouth 4 times a day as needed for muscle spasms. (Patient not taking: Reported on 1/21/2025), Disp: 40 tablet, Rfl: 0    Allergies  Codeine    Review of Systems:  Review of Systems   Constitutional: No fevers, chills, unexpected weight change  HENT: No sore throat, congestion, or nasal drainage  Eyes: No visual changes or eye itching  Respiratory:  see HPI. No cough, worsening dyspnea, wheezing  Cardiac: No chest pain, palpitations, or lower extremity edema  Gastrointestinal: No nausea, vomiting, diarrhea. No abdominal pain  Genitourinary: No dysuria or hematuria  Musculoskeletal: No back pain. No significant myalgias or arthralgias  Neurologic: No headaches, dizziness, or seizures.  Hematologic: No easy bleeding or bruising.  Psychiatric: No anxiety or depression.    Physical Exam:  Physical Exam  /69   Pulse 105   Temp 36.6 °C (97.9 °F)   Ht 1.626 m (5' 4\")   Wt (!) 42.4 kg (93 lb 6.4 oz)   SpO2 97%   BMI 16.03 kg/m²   Constitutional:       General: Patient is not in acute distress.     Appearance: Normal appearance; not ill-appearing.   HENT:      Head: Normocephalic.      Nose: No congestion or rhinorrhea.   Cardiovascular:      Rate and Rhythm: Normal rate and regular rhythm.      Pulses: Normal pulses.   Pulmonary:      Effort: Pulmonary effort is normal. No respiratory distress.  No conversational dyspnea     Breath sounds: No stridor. No wheezing.   Abdominal:      General: There is no distension.      Palpations: Abdomen is soft.      Tenderness: There is no abdominal tenderness. "   Musculoskeletal:         General: No swelling, tenderness or deformity. Normal range of motion.      Cervical back: Normal range of motion. No rigidity.   Lymphadenopathy:      Cervical: No cervical adenopathy.   Skin:     General: Skin is warm and dry.   Neurological:      General: No focal deficit present.      Mental Status: Patient is alert and oriented to person, place, and time.   Psychiatric:         Mood and Affect: Mood normal.     Relevant Results    Pathology:  N/a     Imaging:    CT chest wo IV contrast    Result Date: 1/11/2025  Interpreted By:  Michael Lake, STUDY: CT CHEST WO IV CONTRAST;  1/10/2025 8:57 am   INDICATION: Signs/Symptoms:smoker, rapid weight loss.   ,R63.4 Abnormal weight loss,R05.1 Acute cough   COMPARISON: 02/15/2020   ACCESSION NUMBER(S): [id number]   ORDERING CLINICIAN: ANDREY PAT   TECHNIQUE: Helical data acquisition of the chest was obtained without the use of IV contrast. Images were reformatted in axial, coronal, and sagittal planes.   FINDINGS: POTENTIAL LIMITATIONS OF THE STUDY:   Lack of IV contrast   HEART AND VESSELS:   There are atherosclerotic calcifications of the aorta and its branches. The aorta is unchanged in course and caliber.   The heart is unchanged in size.   No pericardial effusion is seen.   MEDIASTINUM AND BHARAT, LOWER NECK AND AXILLA:   There is a soft tissue mass in the right  paratracheal region which may represent an enlarged lymph node, measured up to 3.0 cm in short axis. This is new when compared to the previous study and is suspicious for underlying malignancy/metastatic disease. There are additional smaller mediastinal nodes which are not pathologically enlarged by size criteria.   Esophagus appears within normal limits as seen.   LUNGS AND AIRWAYS: The trachea and central airways are patent. No endobronchial lesion.   There are emphysematous changes. There are mild scattered areas of atelectasis/scarring. No focal areas of consolidation. No  "effusion. No pneumothorax.   UPPER ABDOMEN: The visualized subdiaphragmatic structures demonstrate no acute abnormality.   CHEST WALL AND OSSEOUS STRUCTURES: Degenerative changes. No acute process.       Interval development of a soft tissue mass versus enlarged lymph node in the right paratracheal region, worrisome for underlying malignancy. Etiology is uncertain. No suspicious lung nodule or mass noted. This will require follow-up/further evaluation.   MACRO: None.   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 Bradshaw is a pleasant 69 year old female who presents with a 3.0 cm right paratracheal lesion. I had a long d/w patient and SO about the ddx including LN, malignancy, bronchogenic cyst, or other lesion. I would like to start with a PET scan - if non-avid would prefer complete surgical resection. If it is PET avid would lean toward biopsy (cervical mediastinoscopy vs EBUS biopsy). I think her weight loss is more related to mechanical issues/decreased PO intake. I will call her with PET results.        I spent 75 minutes in the professional and overall care of this patient.      Vickie Gonzalez, DO  Thoracic & Esophageal Surgery     "

## 2025-01-21 NOTE — PATIENT INSTRUCTIONS
Start taking some form of magnesium.  Mag citrate pills are an option.    Dr Gonzalez would like you to get a PET scan.     PET Scan PREP: Nothing to eat or drink 6 hours prior to the scan.  You should limit yourself to a low carb diet the day before the scan.  (meat, fish, cheese, vegetables & eggs).   Please be sure to drink plenty of water the day before the scan and limit your caffeine. Approximate time 2 hours      You will get a call with results of these tests to go over the next steps.    Call the office with any questions [phone]

## 2025-01-21 NOTE — PROGRESS NOTES
Chief complaint:  Paratracheal lesion    History Of Present Illness  Jaja Bradshaw is a 69 y.o. female, current smoker (0.5ppd x ~50 years), presenting with a right paratracheal lesion. Patient was referred by her PCP, Fide Alonzo.    Patient reports she has been having several generalized issues including diffuse muscle aches which started in her legs but have started to occur all over. She has also had ~30lb weight loss over the last 3-4 months. She does admit that she previously had dentures fitted which she cannot use secondary to discomfort and increased size. She also had previously lost her sense of taste. Between these 2 things she has not wanted to eat nearly as much as she normally would.   She denies any increased SOB, CP, dysphagia, new cough, wheezing, or other.     No history of MI or CVA. Could walk a mile or climb 2 flights of stairs: yes     Past Medical History  She has no past medical history on file.    Surgical History  She has a past surgical history that includes Other surgical history (04/17/2020).     Social History  She reports that she has been smoking cigarettes. She started smoking about 45 years ago. She has a 22.5 pack-year smoking history. She has been exposed to tobacco smoke. She has never used smokeless tobacco. She reports current alcohol use of about 2.0 standard drinks of alcohol per week. She reports that she does not use drugs.    Family History  Family history of cancer: Yes  Family History   Problem Relation Name Age of Onset    Liver cancer Mother          Medications    Current Outpatient Medications:     aspirin 81 mg EC tablet, Take 1 tablet (81 mg) by mouth once daily., Disp: , Rfl:     cholecalciferol (Vitamin D3) 25 MCG (1000 UT) tablet, Take 2 tablets (2,000 Units) by mouth 5 times a day., Disp: , Rfl:     gabapentin (Neurontin) 100 mg capsule, Take 1 capsule (100 mg) by mouth 3 times a day., Disp: 90 capsule, Rfl: 5    levothyroxine (Synthroid, Levoxyl) 75 mcg  "tablet, Take 1 tablet (75 mcg) by mouth once daily., Disp: 90 tablet, Rfl: 1    rOPINIRole (Requip) 1 mg tablet, Take 1 tablet (1 mg) by mouth 3 times a day., Disp: 270 tablet, Rfl: 1    HYDROcodone-acetaminophen (Norco) 5-325 mg tablet, Take 1 tablet by mouth every 6 hours if needed for severe pain (7 - 10) for up to 7 days., Disp: 25 tablet, Rfl: 0    methocarbamol (Robaxin) 500 mg tablet, Take 1 tablet (500 mg) by mouth 4 times a day as needed for muscle spasms. (Patient not taking: Reported on 1/21/2025), Disp: 40 tablet, Rfl: 0    Allergies  Codeine    Review of Systems:  Review of Systems   Constitutional: No fevers, chills, unexpected weight change  HENT: No sore throat, congestion, or nasal drainage  Eyes: No visual changes or eye itching  Respiratory:  see HPI. No cough, worsening dyspnea, wheezing  Cardiac: No chest pain, palpitations, or lower extremity edema  Gastrointestinal: No nausea, vomiting, diarrhea. No abdominal pain  Genitourinary: No dysuria or hematuria  Musculoskeletal: No back pain. No significant myalgias or arthralgias  Neurologic: No headaches, dizziness, or seizures.  Hematologic: No easy bleeding or bruising.  Psychiatric: No anxiety or depression.    Physical Exam:  Physical Exam  /69   Pulse 105   Temp 36.6 °C (97.9 °F)   Ht 1.626 m (5' 4\")   Wt (!) 42.4 kg (93 lb 6.4 oz)   SpO2 97%   BMI 16.03 kg/m²   Constitutional:       General: Patient is not in acute distress.     Appearance: Normal appearance; not ill-appearing.   HENT:      Head: Normocephalic.      Nose: No congestion or rhinorrhea.   Cardiovascular:      Rate and Rhythm: Normal rate and regular rhythm.      Pulses: Normal pulses.   Pulmonary:      Effort: Pulmonary effort is normal. No respiratory distress.  No conversational dyspnea     Breath sounds: No stridor. No wheezing.   Abdominal:      General: There is no distension.      Palpations: Abdomen is soft.      Tenderness: There is no abdominal tenderness. "   Musculoskeletal:         General: No swelling, tenderness or deformity. Normal range of motion.      Cervical back: Normal range of motion. No rigidity.   Lymphadenopathy:      Cervical: No cervical adenopathy.   Skin:     General: Skin is warm and dry.   Neurological:      General: No focal deficit present.      Mental Status: Patient is alert and oriented to person, place, and time.   Psychiatric:         Mood and Affect: Mood normal.     Relevant Results    Pathology:  N/a     Imaging:    CT chest wo IV contrast    Result Date: 1/11/2025  Interpreted By:  Michael Lake, STUDY: CT CHEST WO IV CONTRAST;  1/10/2025 8:57 am   INDICATION: Signs/Symptoms:smoker, rapid weight loss.   ,R63.4 Abnormal weight loss,R05.1 Acute cough   COMPARISON: 02/15/2020   ACCESSION NUMBER(S): [id number]   ORDERING CLINICIAN: ANDREY PAT   TECHNIQUE: Helical data acquisition of the chest was obtained without the use of IV contrast. Images were reformatted in axial, coronal, and sagittal planes.   FINDINGS: POTENTIAL LIMITATIONS OF THE STUDY:   Lack of IV contrast   HEART AND VESSELS:   There are atherosclerotic calcifications of the aorta and its branches. The aorta is unchanged in course and caliber.   The heart is unchanged in size.   No pericardial effusion is seen.   MEDIASTINUM AND BHARAT, LOWER NECK AND AXILLA:   There is a soft tissue mass in the right  paratracheal region which may represent an enlarged lymph node, measured up to 3.0 cm in short axis. This is new when compared to the previous study and is suspicious for underlying malignancy/metastatic disease. There are additional smaller mediastinal nodes which are not pathologically enlarged by size criteria.   Esophagus appears within normal limits as seen.   LUNGS AND AIRWAYS: The trachea and central airways are patent. No endobronchial lesion.   There are emphysematous changes. There are mild scattered areas of atelectasis/scarring. No focal areas of consolidation. No  "effusion. No pneumothorax.   UPPER ABDOMEN: The visualized subdiaphragmatic structures demonstrate no acute abnormality.   CHEST WALL AND OSSEOUS STRUCTURES: Degenerative changes. No acute process.       Interval development of a soft tissue mass versus enlarged lymph node in the right paratracheal region, worrisome for underlying malignancy. Etiology is uncertain. No suspicious lung nodule or mass noted. This will require follow-up/further evaluation.   MACRO: None.   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 Bradshaw is a pleasant 69 year old female who presents with a 3.0 cm right paratracheal lesion. I had a long d/w patient and SO about the ddx including LN, malignancy, bronchogenic cyst, or other lesion. I would like to start with a PET scan - if non-avid would prefer complete surgical resection. If it is PET avid would lean toward biopsy (cervical mediastinoscopy vs EBUS biopsy). I think her weight loss is more related to mechanical issues/decreased PO intake. I will call her with PET results.        I spent 75 minutes in the professional and overall care of this patient.      Vickie Gonzalez, DO  Thoracic & Esophageal Surgery     "

## 2025-01-23 ENCOUNTER — APPOINTMENT (OUTPATIENT)
Dept: PRIMARY CARE | Facility: CLINIC | Age: 70
End: 2025-01-23
Payer: MEDICARE

## 2025-01-23 VITALS
WEIGHT: 92.6 LBS | DIASTOLIC BLOOD PRESSURE: 90 MMHG | HEART RATE: 96 BPM | TEMPERATURE: 97.5 F | HEIGHT: 64 IN | BODY MASS INDEX: 15.81 KG/M2 | SYSTOLIC BLOOD PRESSURE: 136 MMHG | OXYGEN SATURATION: 98 %

## 2025-01-23 DIAGNOSIS — J43.9 PULMONARY EMPHYSEMA, UNSPECIFIED EMPHYSEMA TYPE (MULTI): ICD-10-CM

## 2025-01-23 DIAGNOSIS — E46 PROTEIN-CALORIE MALNUTRITION, UNSPECIFIED SEVERITY (MULTI): ICD-10-CM

## 2025-01-23 DIAGNOSIS — E03.9 HYPOTHYROIDISM, UNSPECIFIED TYPE: Primary | ICD-10-CM

## 2025-01-23 DIAGNOSIS — J39.8 TRACHEAL MASS: ICD-10-CM

## 2025-01-23 DIAGNOSIS — G25.81 RLS (RESTLESS LEGS SYNDROME): ICD-10-CM

## 2025-01-23 PROCEDURE — 3008F BODY MASS INDEX DOCD: CPT | Performed by: NURSE PRACTITIONER

## 2025-01-23 PROCEDURE — 1159F MED LIST DOCD IN RCRD: CPT | Performed by: NURSE PRACTITIONER

## 2025-01-23 PROCEDURE — 99214 OFFICE O/P EST MOD 30 MIN: CPT | Performed by: NURSE PRACTITIONER

## 2025-01-23 PROCEDURE — 1124F ACP DISCUSS-NO DSCNMKR DOCD: CPT | Performed by: NURSE PRACTITIONER

## 2025-01-23 PROCEDURE — 3075F SYST BP GE 130 - 139MM HG: CPT | Performed by: NURSE PRACTITIONER

## 2025-01-23 PROCEDURE — 3080F DIAST BP >= 90 MM HG: CPT | Performed by: NURSE PRACTITIONER

## 2025-01-23 RX ORDER — LEVOTHYROXINE SODIUM 100 UG/1
100 TABLET ORAL DAILY
Qty: 30 TABLET | Refills: 11 | Status: SHIPPED | OUTPATIENT
Start: 2025-01-23 | End: 2026-01-23

## 2025-01-23 ASSESSMENT — ENCOUNTER SYMPTOMS
ABDOMINAL PAIN: 0
SHORTNESS OF BREATH: 0
SLEEP DISTURBANCE: 0
COUGH: 0
HEADACHES: 0
VOMITING: 0
DIZZINESS: 0
PALPITATIONS: 0
APNEA: 0
SPEECH DIFFICULTY: 0
CONSTITUTIONAL NEGATIVE: 1
SORE THROAT: 0
WEAKNESS: 0
FEVER: 0
ARTHRALGIAS: 0
ACTIVITY CHANGE: 0
CHILLS: 0
MYALGIAS: 0
NERVOUS/ANXIOUS: 0
CONFUSION: 0
NAUSEA: 0

## 2025-01-23 ASSESSMENT — PATIENT HEALTH QUESTIONNAIRE - PHQ9
2. FEELING DOWN, DEPRESSED OR HOPELESS: NOT AT ALL
1. LITTLE INTEREST OR PLEASURE IN DOING THINGS: NOT AT ALL
SUM OF ALL RESPONSES TO PHQ9 QUESTIONS 1 AND 2: 0

## 2025-01-23 NOTE — ASSESSMENT & PLAN NOTE
Continue Requip 3 times daily, Robaxin 4 times daily as needed, Norco every 6 hours as needed and gabapentin 100 mg 3 times daily  Can use over the counter Voltaren

## 2025-01-23 NOTE — PROGRESS NOTES
"Subjective   Patient ID: Jaja Bradshaw is a 69 y.o. female who presents for Hypothyroidism, Tracheal mass, and Leg Pain.    Hypothyroidism: TSH elevated at 13.20 and T4 elevated at 1.14  Patient currently on levothyroxine 75 mcg daily.  Discussed increasing      Malnutrition: BMI 15.89, 92 pounds  Patient reports ill fitting dentures, decreased appetite and loss of taste affecting diet/appetite.  Enlarged lymph node and peritracheal mass: Seen by thoracic surgeon on 1/21/2025.  Plans to have PET scan on tomorrow.  If avid PET scan surgeon is leaning toward biopsy and if PET scan does not have it preference to complete surgical resection.    Lower extremity pain: Treating for restless leg syndrome however of gabapentin, Robaxin, Requip and Norco are not controlling her pain.    CT chest   Interval development of a soft tissue mass versus enlarged lymph node  in the right paratracheal region, worrisome for underlying  malignancy. Etiology is uncertain. No suspicious lung nodule or mass  noted.            Review of Systems   Constitutional: Negative.  Negative for activity change, chills and fever.   HENT:  Negative for congestion, postnasal drip, sneezing and sore throat.    Respiratory:  Negative for apnea, cough and shortness of breath.    Cardiovascular:  Negative for chest pain and palpitations.   Gastrointestinal:  Negative for abdominal pain, nausea and vomiting.   Musculoskeletal:  Negative for arthralgias and myalgias.        Bilateral lower extremity leg pain   Neurological:  Negative for dizziness, syncope, speech difficulty, weakness and headaches.   Psychiatric/Behavioral:  Negative for confusion and sleep disturbance. The patient is not nervous/anxious.        Objective   /90   Pulse 96   Temp 36.4 °C (97.5 °F)   Ht 1.626 m (5' 4\")   Wt (!) 42 kg (92 lb 9.6 oz)   SpO2 98%   BMI 15.89 kg/m²     Physical Exam  Vitals reviewed.   Constitutional:       Appearance: Normal appearance.   HENT:    "   Head: Normocephalic.   Cardiovascular:      Rate and Rhythm: Normal rate and regular rhythm.      Pulses: Normal pulses.      Heart sounds: Normal heart sounds.   Pulmonary:      Effort: Pulmonary effort is normal. No respiratory distress.      Breath sounds: Normal breath sounds. No wheezing.   Abdominal:      General: Bowel sounds are normal.   Musculoskeletal:         General: Normal range of motion.      Cervical back: Normal range of motion.   Neurological:      General: No focal deficit present.      Mental Status: She is alert and oriented to person, place, and time.   Psychiatric:         Mood and Affect: Mood normal.         Behavior: Behavior normal.         Assessment/Plan   Problem List Items Addressed This Visit             ICD-10-CM    Chronic obstructive pulmonary disease (Multi) J44.9     Asymptomatic  Recommend smoking cessation  Declines inhalers         Hypothyroidism - Primary E03.9     Elevated TSH at 13.20 with T4 elevated at 1.17  Increase levothyroxine to 100mcg daily   Recheck in 6 weeks          Relevant Medications    levothyroxine (Synthroid, Levoxyl) 100 mcg tablet    Other Relevant Orders    Tsh With Reflex To Free T4 If Abnormal    RLS (restless legs syndrome) G25.81     Continue Requip 3 times daily, Robaxin 4 times daily as needed, Norco every 6 hours as needed and gabapentin 100 mg 3 times daily  Can use over the counter Voltaren         Protein-calorie malnutrition, unspecified severity (Multi) E46     Fortified foods with each meal  Recommend following up with dental in regards to ill fitting dentures  Multivitamin with minerals daily         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  Has PET scan on tomorrow  Plans to likely have biopsy

## 2025-01-23 NOTE — ASSESSMENT & PLAN NOTE
Elevated TSH at 13.20 with T4 elevated at 1.17  Increase levothyroxine to 100mcg daily   Recheck in 6 weeks

## 2025-01-23 NOTE — ASSESSMENT & PLAN NOTE
Fortified foods with each meal  Recommend following up with dental in regards to ill fitting dentures  Multivitamin with minerals daily

## 2025-01-24 ENCOUNTER — HOSPITAL ENCOUNTER (OUTPATIENT)
Dept: RADIOLOGY | Facility: HOSPITAL | Age: 70
Discharge: HOME | End: 2025-01-24
Payer: MEDICARE

## 2025-01-24 DIAGNOSIS — D38.3 NEOPLASM OF UNCERTAIN BEHAVIOR OF MEDIASTINUM: ICD-10-CM

## 2025-01-24 DIAGNOSIS — J39.8 TRACHEAL MASS: ICD-10-CM

## 2025-01-24 PROCEDURE — A9552 F18 FDG: HCPCS | Performed by: STUDENT IN AN ORGANIZED HEALTH CARE EDUCATION/TRAINING PROGRAM

## 2025-01-24 PROCEDURE — 3430000001 HC RX 343 DIAGNOSTIC RADIOPHARMACEUTICALS: Performed by: STUDENT IN AN ORGANIZED HEALTH CARE EDUCATION/TRAINING PROGRAM

## 2025-01-24 PROCEDURE — 78815 PET IMAGE W/CT SKULL-THIGH: CPT | Mod: PI

## 2025-01-24 RX ORDER — FLUDEOXYGLUCOSE F 18 200 MCI/ML
11.5 INJECTION, SOLUTION INTRAVENOUS
Status: COMPLETED | OUTPATIENT
Start: 2025-01-24 | End: 2025-01-24

## 2025-01-24 RX ADMIN — FLUDEOXYGLUCOSE F 18 11.5 MILLICURIE: 200 INJECTION, SOLUTION INTRAVENOUS at 07:22

## 2025-01-27 ENCOUNTER — PREP FOR PROCEDURE (OUTPATIENT)
Dept: CARDIOTHORACIC SURGERY | Facility: HOSPITAL | Age: 70
End: 2025-01-27
Payer: COMMERCIAL

## 2025-01-27 DIAGNOSIS — M79.18 MUSCLE ACHE OF EXTREMITY: ICD-10-CM

## 2025-01-27 DIAGNOSIS — R59.0 MEDIASTINAL LYMPHADENOPATHY: Primary | ICD-10-CM

## 2025-01-27 DIAGNOSIS — G25.81 RLS (RESTLESS LEGS SYNDROME): Primary | ICD-10-CM

## 2025-01-27 RX ORDER — CEFAZOLIN SODIUM 2 G/50ML
2 SOLUTION INTRAVENOUS ONCE
Status: CANCELLED | OUTPATIENT
Start: 2025-01-27 | End: 2025-01-27

## 2025-01-27 RX ORDER — OXYCODONE HYDROCHLORIDE 5 MG/1
5 TABLET ORAL EVERY 6 HOURS PRN
Qty: 20 TABLET | Refills: 0 | Status: SHIPPED | OUTPATIENT
Start: 2025-01-27 | End: 2025-02-03

## 2025-01-30 ENCOUNTER — APPOINTMENT (OUTPATIENT)
Dept: RADIOLOGY | Facility: HOSPITAL | Age: 70
End: 2025-01-30
Payer: MEDICARE

## 2025-01-30 ENCOUNTER — ANESTHESIA (OUTPATIENT)
Dept: OPERATING ROOM | Facility: HOSPITAL | Age: 70
End: 2025-01-30
Payer: MEDICARE

## 2025-01-30 ENCOUNTER — APPOINTMENT (OUTPATIENT)
Dept: CARDIOLOGY | Facility: HOSPITAL | Age: 70
End: 2025-01-30
Payer: MEDICARE

## 2025-01-30 ENCOUNTER — HOSPITAL ENCOUNTER (OUTPATIENT)
Facility: HOSPITAL | Age: 70
Setting detail: OUTPATIENT SURGERY
Discharge: HOME | End: 2025-01-30
Attending: STUDENT IN AN ORGANIZED HEALTH CARE EDUCATION/TRAINING PROGRAM | Admitting: STUDENT IN AN ORGANIZED HEALTH CARE EDUCATION/TRAINING PROGRAM
Payer: MEDICARE

## 2025-01-30 ENCOUNTER — ANESTHESIA EVENT (OUTPATIENT)
Dept: OPERATING ROOM | Facility: HOSPITAL | Age: 70
End: 2025-01-30
Payer: MEDICARE

## 2025-01-30 VITALS
SYSTOLIC BLOOD PRESSURE: 136 MMHG | BODY MASS INDEX: 15.48 KG/M2 | HEART RATE: 65 BPM | TEMPERATURE: 97.3 F | RESPIRATION RATE: 16 BRPM | OXYGEN SATURATION: 98 % | DIASTOLIC BLOOD PRESSURE: 65 MMHG | WEIGHT: 90.17 LBS

## 2025-01-30 DIAGNOSIS — J39.8 TRACHEAL MASS: Primary | ICD-10-CM

## 2025-01-30 DIAGNOSIS — R59.0 MEDIASTINAL LYMPHADENOPATHY: ICD-10-CM

## 2025-01-30 LAB
ABO GROUP (TYPE) IN BLOOD: NORMAL
ABO GROUP (TYPE) IN BLOOD: NORMAL
ANTIBODY SCREEN: NORMAL
ATRIAL RATE: 121 BPM
P AXIS: 67 DEGREES
PR INTERVAL: 128 MS
Q ONSET: 226 MS
QRS COUNT: 19 BEATS
QRS DURATION: 62 MS
QT INTERVAL: 422 MS
QTC CALCULATION(BAZETT): 599 MS
QTC FREDERICIA: 533 MS
R AXIS: -77 DEGREES
RH FACTOR (ANTIGEN D): NORMAL
RH FACTOR (ANTIGEN D): NORMAL
T AXIS: 72 DEGREES
T OFFSET: 437 MS
VENTRICULAR RATE: 121 BPM

## 2025-01-30 PROCEDURE — 93010 ELECTROCARDIOGRAM REPORT: CPT | Performed by: STUDENT IN AN ORGANIZED HEALTH CARE EDUCATION/TRAINING PROGRAM

## 2025-01-30 PROCEDURE — 88342 IMHCHEM/IMCYTCHM 1ST ANTB: CPT | Performed by: PATHOLOGY

## 2025-01-30 PROCEDURE — 3700000001 HC GENERAL ANESTHESIA TIME - INITIAL BASE CHARGE: Performed by: STUDENT IN AN ORGANIZED HEALTH CARE EDUCATION/TRAINING PROGRAM

## 2025-01-30 PROCEDURE — 88189 FLOWCYTOMETRY/READ 16 & >: CPT | Performed by: STUDENT IN AN ORGANIZED HEALTH CARE EDUCATION/TRAINING PROGRAM

## 2025-01-30 PROCEDURE — 7100000002 HC RECOVERY ROOM TIME - EACH INCREMENTAL 1 MINUTE: Performed by: STUDENT IN AN ORGANIZED HEALTH CARE EDUCATION/TRAINING PROGRAM

## 2025-01-30 PROCEDURE — 7100000009 HC PHASE TWO TIME - INITIAL BASE CHARGE: Performed by: STUDENT IN AN ORGANIZED HEALTH CARE EDUCATION/TRAINING PROGRAM

## 2025-01-30 PROCEDURE — 88185 FLOWCYTOMETRY/TC ADD-ON: CPT | Mod: TC,MUE | Performed by: STUDENT IN AN ORGANIZED HEALTH CARE EDUCATION/TRAINING PROGRAM

## 2025-01-30 PROCEDURE — 86902 BLOOD TYPE ANTIGEN DONOR EA: CPT

## 2025-01-30 PROCEDURE — 88307 TISSUE EXAM BY PATHOLOGIST: CPT | Mod: TC,SUR | Performed by: STUDENT IN AN ORGANIZED HEALTH CARE EDUCATION/TRAINING PROGRAM

## 2025-01-30 PROCEDURE — 88307 TISSUE EXAM BY PATHOLOGIST: CPT | Performed by: PATHOLOGY

## 2025-01-30 PROCEDURE — 2500000001 HC RX 250 WO HCPCS SELF ADMINISTERED DRUGS (ALT 637 FOR MEDICARE OP): Performed by: STUDENT IN AN ORGANIZED HEALTH CARE EDUCATION/TRAINING PROGRAM

## 2025-01-30 PROCEDURE — 2500000001 HC RX 250 WO HCPCS SELF ADMINISTERED DRUGS (ALT 637 FOR MEDICARE OP): Performed by: ANESTHESIOLOGIST ASSISTANT

## 2025-01-30 PROCEDURE — P9047 ALBUMIN (HUMAN), 25%, 50ML: HCPCS | Mod: TB | Performed by: ANESTHESIOLOGIST ASSISTANT

## 2025-01-30 PROCEDURE — 71045 X-RAY EXAM CHEST 1 VIEW: CPT

## 2025-01-30 PROCEDURE — 88331 PATH CONSLTJ SURG 1 BLK 1SPC: CPT | Performed by: PATHOLOGY

## 2025-01-30 PROCEDURE — 71045 X-RAY EXAM CHEST 1 VIEW: CPT | Performed by: RADIOLOGY

## 2025-01-30 PROCEDURE — 88341 IMHCHEM/IMCYTCHM EA ADD ANTB: CPT | Performed by: PATHOLOGY

## 2025-01-30 PROCEDURE — 2500000005 HC RX 250 GENERAL PHARMACY W/O HCPCS: Performed by: STUDENT IN AN ORGANIZED HEALTH CARE EDUCATION/TRAINING PROGRAM

## 2025-01-30 PROCEDURE — 86870 RBC ANTIBODY IDENTIFICATION: CPT

## 2025-01-30 PROCEDURE — 2720000007 HC OR 272 NO HCPCS: Performed by: STUDENT IN AN ORGANIZED HEALTH CARE EDUCATION/TRAINING PROGRAM

## 2025-01-30 PROCEDURE — 36620 INSERTION CATHETER ARTERY: CPT | Performed by: ANESTHESIOLOGIST ASSISTANT

## 2025-01-30 PROCEDURE — 36415 COLL VENOUS BLD VENIPUNCTURE: CPT | Performed by: STUDENT IN AN ORGANIZED HEALTH CARE EDUCATION/TRAINING PROGRAM

## 2025-01-30 PROCEDURE — 7100000001 HC RECOVERY ROOM TIME - INITIAL BASE CHARGE: Performed by: STUDENT IN AN ORGANIZED HEALTH CARE EDUCATION/TRAINING PROGRAM

## 2025-01-30 PROCEDURE — 7100000010 HC PHASE TWO TIME - EACH INCREMENTAL 1 MINUTE: Performed by: STUDENT IN AN ORGANIZED HEALTH CARE EDUCATION/TRAINING PROGRAM

## 2025-01-30 PROCEDURE — 86077 PHYS BLOOD BANK SERV XMATCH: CPT | Performed by: STUDENT IN AN ORGANIZED HEALTH CARE EDUCATION/TRAINING PROGRAM

## 2025-01-30 PROCEDURE — 3600000003 HC OR TIME - INITIAL BASE CHARGE - PROCEDURE LEVEL THREE: Performed by: STUDENT IN AN ORGANIZED HEALTH CARE EDUCATION/TRAINING PROGRAM

## 2025-01-30 PROCEDURE — 93005 ELECTROCARDIOGRAM TRACING: CPT

## 2025-01-30 PROCEDURE — 2500000004 HC RX 250 GENERAL PHARMACY W/ HCPCS (ALT 636 FOR OP/ED): Performed by: ANESTHESIOLOGIST ASSISTANT

## 2025-01-30 PROCEDURE — 3600000008 HC OR TIME - EACH INCREMENTAL 1 MINUTE - PROCEDURE LEVEL THREE: Performed by: STUDENT IN AN ORGANIZED HEALTH CARE EDUCATION/TRAINING PROGRAM

## 2025-01-30 PROCEDURE — 88185 FLOWCYTOMETRY/TC ADD-ON: CPT | Mod: TC | Performed by: STUDENT IN AN ORGANIZED HEALTH CARE EDUCATION/TRAINING PROGRAM

## 2025-01-30 PROCEDURE — 2500000002 HC RX 250 W HCPCS SELF ADMINISTERED DRUGS (ALT 637 FOR MEDICARE OP, ALT 636 FOR OP/ED): Performed by: ANESTHESIOLOGIST ASSISTANT

## 2025-01-30 PROCEDURE — 3700000002 HC GENERAL ANESTHESIA TIME - EACH INCREMENTAL 1 MINUTE: Performed by: STUDENT IN AN ORGANIZED HEALTH CARE EDUCATION/TRAINING PROGRAM

## 2025-01-30 PROCEDURE — 86905 BLOOD TYPING RBC ANTIGENS: CPT

## 2025-01-30 PROCEDURE — 88305 TISSUE EXAM BY PATHOLOGIST: CPT | Performed by: PATHOLOGY

## 2025-01-30 PROCEDURE — 88312 SPECIAL STAINS GROUP 1: CPT | Performed by: PATHOLOGY

## 2025-01-30 PROCEDURE — 39402 MEDIASTINOSCPY W/LMPH NOD BX: CPT | Performed by: STUDENT IN AN ORGANIZED HEALTH CARE EDUCATION/TRAINING PROGRAM

## 2025-01-30 PROCEDURE — 86901 BLOOD TYPING SEROLOGIC RH(D): CPT | Performed by: STUDENT IN AN ORGANIZED HEALTH CARE EDUCATION/TRAINING PROGRAM

## 2025-01-30 PROCEDURE — 2500000004 HC RX 250 GENERAL PHARMACY W/ HCPCS (ALT 636 FOR OP/ED): Performed by: STUDENT IN AN ORGANIZED HEALTH CARE EDUCATION/TRAINING PROGRAM

## 2025-01-30 RX ORDER — MIDAZOLAM HYDROCHLORIDE 1 MG/ML
INJECTION INTRAMUSCULAR; INTRAVENOUS AS NEEDED
Status: DISCONTINUED | OUTPATIENT
Start: 2025-01-30 | End: 2025-01-30

## 2025-01-30 RX ORDER — LIDOCAINE HYDROCHLORIDE 10 MG/ML
0.1 INJECTION, SOLUTION INFILTRATION; PERINEURAL ONCE
Status: DISCONTINUED | OUTPATIENT
Start: 2025-01-30 | End: 2025-01-30 | Stop reason: HOSPADM

## 2025-01-30 RX ORDER — SODIUM CHLORIDE 0.9 G/100ML
INJECTION, SOLUTION IRRIGATION AS NEEDED
Status: DISCONTINUED | OUTPATIENT
Start: 2025-01-30 | End: 2025-01-30 | Stop reason: HOSPADM

## 2025-01-30 RX ORDER — PROPOFOL 10 MG/ML
INJECTION, EMULSION INTRAVENOUS AS NEEDED
Status: DISCONTINUED | OUTPATIENT
Start: 2025-01-30 | End: 2025-01-30

## 2025-01-30 RX ORDER — METHADONE IN SOD CHLOR,ISO-OSM 10 MG/ML
SYRINGE (ML) INTRAVENOUS AS NEEDED
Status: DISCONTINUED | OUTPATIENT
Start: 2025-01-30 | End: 2025-01-30

## 2025-01-30 RX ORDER — HYDROMORPHONE HYDROCHLORIDE 0.2 MG/ML
0.2 INJECTION INTRAMUSCULAR; INTRAVENOUS; SUBCUTANEOUS EVERY 5 MIN PRN
Status: DISCONTINUED | OUTPATIENT
Start: 2025-01-30 | End: 2025-01-30 | Stop reason: HOSPADM

## 2025-01-30 RX ORDER — ALBUMIN HUMAN 250 G/1000ML
SOLUTION INTRAVENOUS CONTINUOUS PRN
Status: DISCONTINUED | OUTPATIENT
Start: 2025-01-30 | End: 2025-01-30

## 2025-01-30 RX ORDER — ONDANSETRON HYDROCHLORIDE 2 MG/ML
INJECTION, SOLUTION INTRAVENOUS AS NEEDED
Status: DISCONTINUED | OUTPATIENT
Start: 2025-01-30 | End: 2025-01-30

## 2025-01-30 RX ORDER — OXYCODONE HYDROCHLORIDE 5 MG/1
5 TABLET ORAL EVERY 4 HOURS PRN
Status: COMPLETED | OUTPATIENT
Start: 2025-01-30 | End: 2025-01-30

## 2025-01-30 RX ORDER — METOCLOPRAMIDE HYDROCHLORIDE 5 MG/ML
10 INJECTION INTRAMUSCULAR; INTRAVENOUS ONCE AS NEEDED
Status: DISCONTINUED | OUTPATIENT
Start: 2025-01-30 | End: 2025-01-30 | Stop reason: HOSPADM

## 2025-01-30 RX ORDER — DEXMEDETOMIDINE HYDROCHLORIDE 4 UG/ML
INJECTION, SOLUTION INTRAVENOUS CONTINUOUS PRN
Status: DISCONTINUED | OUTPATIENT
Start: 2025-01-30 | End: 2025-01-30

## 2025-01-30 RX ORDER — SODIUM CHLORIDE, SODIUM LACTATE, POTASSIUM CHLORIDE, CALCIUM CHLORIDE 600; 310; 30; 20 MG/100ML; MG/100ML; MG/100ML; MG/100ML
50 INJECTION, SOLUTION INTRAVENOUS CONTINUOUS
Status: DISCONTINUED | OUTPATIENT
Start: 2025-01-30 | End: 2025-01-30 | Stop reason: HOSPADM

## 2025-01-30 RX ORDER — ONDANSETRON HYDROCHLORIDE 2 MG/ML
4 INJECTION, SOLUTION INTRAVENOUS ONCE AS NEEDED
Status: DISCONTINUED | OUTPATIENT
Start: 2025-01-30 | End: 2025-01-30 | Stop reason: HOSPADM

## 2025-01-30 RX ORDER — PHENYLEPHRINE HCL IN 0.9% NACL 0.4MG/10ML
SYRINGE (ML) INTRAVENOUS AS NEEDED
Status: DISCONTINUED | OUTPATIENT
Start: 2025-01-30 | End: 2025-01-30

## 2025-01-30 RX ORDER — ACETAMINOPHEN 10 MG/ML
INJECTION, SOLUTION INTRAVENOUS AS NEEDED
Status: DISCONTINUED | OUTPATIENT
Start: 2025-01-30 | End: 2025-01-30

## 2025-01-30 RX ORDER — FENTANYL CITRATE 50 UG/ML
INJECTION, SOLUTION INTRAMUSCULAR; INTRAVENOUS AS NEEDED
Status: DISCONTINUED | OUTPATIENT
Start: 2025-01-30 | End: 2025-01-30

## 2025-01-30 RX ORDER — BUPIVACAINE HCL/EPINEPHRINE 0.25-.0005
VIAL (ML) INJECTION AS NEEDED
Status: DISCONTINUED | OUTPATIENT
Start: 2025-01-30 | End: 2025-01-30 | Stop reason: HOSPADM

## 2025-01-30 RX ORDER — ALBUTEROL SULFATE 0.83 MG/ML
2.5 SOLUTION RESPIRATORY (INHALATION) ONCE AS NEEDED
Status: DISCONTINUED | OUTPATIENT
Start: 2025-01-30 | End: 2025-01-30 | Stop reason: HOSPADM

## 2025-01-30 RX ORDER — ROCURONIUM BROMIDE 10 MG/ML
INJECTION, SOLUTION INTRAVENOUS AS NEEDED
Status: DISCONTINUED | OUTPATIENT
Start: 2025-01-30 | End: 2025-01-30

## 2025-01-30 RX ORDER — ACETAMINOPHEN 500 MG
1000 TABLET ORAL EVERY 8 HOURS PRN
Qty: 30 TABLET | Refills: 0 | Status: SHIPPED | OUTPATIENT
Start: 2025-01-30

## 2025-01-30 RX ORDER — HYDRALAZINE HYDROCHLORIDE 20 MG/ML
5 INJECTION INTRAMUSCULAR; INTRAVENOUS EVERY 30 MIN PRN
Status: DISCONTINUED | OUTPATIENT
Start: 2025-01-30 | End: 2025-01-30 | Stop reason: HOSPADM

## 2025-01-30 RX ORDER — LABETALOL HYDROCHLORIDE 5 MG/ML
5 INJECTION, SOLUTION INTRAVENOUS ONCE AS NEEDED
Status: DISCONTINUED | OUTPATIENT
Start: 2025-01-30 | End: 2025-01-30 | Stop reason: HOSPADM

## 2025-01-30 RX ORDER — SODIUM CITRATE AND CITRIC ACID MONOHYDRATE 334; 500 MG/5ML; MG/5ML
SOLUTION ORAL AS NEEDED
Status: DISCONTINUED | OUTPATIENT
Start: 2025-01-30 | End: 2025-01-30

## 2025-01-30 RX ORDER — LIDOCAINE HCL/PF 100 MG/5ML
SYRINGE (ML) INTRAVENOUS AS NEEDED
Status: DISCONTINUED | OUTPATIENT
Start: 2025-01-30 | End: 2025-01-30

## 2025-01-30 RX ORDER — CEFAZOLIN 1 G/1
INJECTION, POWDER, FOR SOLUTION INTRAVENOUS AS NEEDED
Status: DISCONTINUED | OUTPATIENT
Start: 2025-01-30 | End: 2025-01-30

## 2025-01-30 RX ORDER — METHOCARBAMOL 100 MG/ML
1000 INJECTION, SOLUTION INTRAMUSCULAR; INTRAVENOUS ONCE
Status: DISCONTINUED | OUTPATIENT
Start: 2025-01-30 | End: 2025-01-30 | Stop reason: HOSPADM

## 2025-01-30 RX ORDER — APREPITANT 40 MG/1
CAPSULE ORAL AS NEEDED
Status: DISCONTINUED | OUTPATIENT
Start: 2025-01-30 | End: 2025-01-30

## 2025-01-30 RX ADMIN — APREPITANT 40 MG: 40 CAPSULE ORAL at 13:20

## 2025-01-30 RX ADMIN — PROPOFOL 100 MG: 10 INJECTION, EMULSION INTRAVENOUS at 13:31

## 2025-01-30 RX ADMIN — Medication 160 MCG: at 13:46

## 2025-01-30 RX ADMIN — PROPOFOL 50 MG: 10 INJECTION, EMULSION INTRAVENOUS at 13:30

## 2025-01-30 RX ADMIN — ALBUMIN HUMAN: 0.25 SOLUTION INTRAVENOUS at 14:05

## 2025-01-30 RX ADMIN — SUGAMMADEX 200 MG: 100 INJECTION, SOLUTION INTRAVENOUS at 14:54

## 2025-01-30 RX ADMIN — Medication 160 MCG: at 13:41

## 2025-01-30 RX ADMIN — ROCURONIUM 20 MG: 50 INJECTION, SOLUTION INTRAVENOUS at 13:49

## 2025-01-30 RX ADMIN — Medication 200 MCG: at 13:33

## 2025-01-30 RX ADMIN — ACETAMINOPHEN 1000 MG: 10 INJECTION, SOLUTION INTRAVENOUS at 14:48

## 2025-01-30 RX ADMIN — Medication 160 MCG: at 14:22

## 2025-01-30 RX ADMIN — DEXAMETHASONE SODIUM PHOSPHATE 10 MG: 4 INJECTION INTRA-ARTICULAR; INTRALESIONAL; INTRAMUSCULAR; INTRAVENOUS; SOFT TISSUE at 13:41

## 2025-01-30 RX ADMIN — FENTANYL CITRATE 50 MCG: 50 INJECTION, SOLUTION INTRAMUSCULAR; INTRAVENOUS at 13:30

## 2025-01-30 RX ADMIN — SODIUM CITRATE AND CITRIC ACID MONOHYDRATE 30 ML: 500; 334 SOLUTION ORAL at 13:20

## 2025-01-30 RX ADMIN — MIDAZOLAM HYDROCHLORIDE 2 MG: 1 INJECTION, SOLUTION INTRAMUSCULAR; INTRAVENOUS at 13:21

## 2025-01-30 RX ADMIN — CEFAZOLIN 2 G: 1 INJECTION, POWDER, FOR SOLUTION INTRAMUSCULAR; INTRAVENOUS at 13:40

## 2025-01-30 RX ADMIN — LIDOCAINE HYDROCHLORIDE 40 MG: 20 INJECTION INTRAVENOUS at 13:30

## 2025-01-30 RX ADMIN — SODIUM CHLORIDE, SODIUM LACTATE, POTASSIUM CHLORIDE, AND CALCIUM CHLORIDE: 600; 310; 30; 20 INJECTION, SOLUTION INTRAVENOUS at 13:21

## 2025-01-30 RX ADMIN — OXYCODONE 5 MG: 5 TABLET ORAL at 15:58

## 2025-01-30 RX ADMIN — DEXMEDETOMIDINE HYDROCHLORIDE 0.4 MCG/KG/HR: 4 INJECTION, SOLUTION INTRAVENOUS at 13:37

## 2025-01-30 RX ADMIN — Medication 5 MG: at 13:48

## 2025-01-30 RX ADMIN — ROCURONIUM 50 MG: 50 INJECTION, SOLUTION INTRAVENOUS at 13:33

## 2025-01-30 RX ADMIN — ONDANSETRON 4 MG: 2 INJECTION INTRAMUSCULAR; INTRAVENOUS at 14:51

## 2025-01-30 SDOH — HEALTH STABILITY: MENTAL HEALTH: CURRENT SMOKER: 1

## 2025-01-30 ASSESSMENT — PAIN - FUNCTIONAL ASSESSMENT
PAIN_FUNCTIONAL_ASSESSMENT: 0-10

## 2025-01-30 ASSESSMENT — PAIN SCALES - GENERAL
PAINLEVEL_OUTOF10: 10 - WORST POSSIBLE PAIN
PAINLEVEL_OUTOF10: 6
PAINLEVEL_OUTOF10: 0 - NO PAIN
PAINLEVEL_OUTOF10: 5 - MODERATE PAIN
PAINLEVEL_OUTOF10: 7
PAINLEVEL_OUTOF10: 0 - NO PAIN
PAINLEVEL_OUTOF10: 4
PAINLEVEL_OUTOF10: 7
PAINLEVEL_OUTOF10: 4

## 2025-01-30 ASSESSMENT — COLUMBIA-SUICIDE SEVERITY RATING SCALE - C-SSRS
6. HAVE YOU EVER DONE ANYTHING, STARTED TO DO ANYTHING, OR PREPARED TO DO ANYTHING TO END YOUR LIFE?: NO
1. IN THE PAST MONTH, HAVE YOU WISHED YOU WERE DEAD OR WISHED YOU COULD GO TO SLEEP AND NOT WAKE UP?: NO
2. HAVE YOU ACTUALLY HAD ANY THOUGHTS OF KILLING YOURSELF?: NO

## 2025-01-30 ASSESSMENT — PAIN DESCRIPTION - DESCRIPTORS: DESCRIPTORS: HEADACHE

## 2025-01-30 NOTE — ANESTHESIA PROCEDURE NOTES
Peripheral IV  Date/Time: 1/30/2025 1:36 PM      Placement  Needle size: 16 G  Laterality: left  Location: forearm  Site prep: alcohol

## 2025-01-30 NOTE — DISCHARGE INSTRUCTIONS
Resume liquid diet immediately post op, can advance to regular diet as tolerated.     Continue the oxycodone for pain, there is a new prescription for tylenol at your home pharmacy in addition.     You may shower after removing the gauze dressing tomorrow. Do not submerge the incision under water until seen in clinic.    Return to ED for significant shortness of breath, chest pain, neck pain, fever, chills.     No activity restrictions at this time.

## 2025-01-30 NOTE — ANESTHESIA POSTPROCEDURE EVALUATION
Patient: Jaja Bradshaw    Procedure Summary       Date: 01/30/25 Room / Location: [facility] OR 22 / [facility] OR    Anesthesia Start: 1323 Anesthesia Stop: 1521    Procedure: CERVICAL MEDIASTINOSCOPY (Chest) Diagnosis:       Mediastinal lymphadenopathy      (Mediastinal lymphadenopathy [R59.0])    Surgeons: Vickie Gonzalez DO Responsible Provider: Sally Sheffield MD    Anesthesia Type: general ASA Status: 4            Anesthesia Type: general    Vitals Value Taken Time   /71 01/30/25 1515   Temp 35.8 01/30/25 1521   Pulse 81 01/30/25 1517   Resp 12 01/30/25 1517   SpO2 99 % 01/30/25 1517   Vitals shown include unfiled device data.    Anesthesia Post Evaluation    Patient location during evaluation: PACU  Patient participation: complete - patient participated  Level of consciousness: sleepy but conscious  Pain management: adequate  Airway patency: patent  Cardiovascular status: acceptable  Respiratory status: acceptable  Hydration status: acceptable  Postoperative Nausea and Vomiting: none  Comments: Patient SV on 8 L/min O2 with SFM. VSS.  Report given to nurse.        No notable events documented.

## 2025-01-30 NOTE — INTERVAL H&P NOTE
H&P reviewed. The patient was examined and there are no changes to the H&P.    No new fever, chills, cough, dyspnea, chest pain.     To OR for C-med  Likely Home from PACU

## 2025-01-30 NOTE — ANESTHESIA PROCEDURE NOTES
Arterial Line:    Date/Time: 1/30/2025 1:36 PM    Staffing  Performed: attending   Authorized by: Sally Sheffield MD    Performed by: LACEY Oh    An arterial line was placed. Procedure performed using surface landmarks.in the OR for the following indication(s): continuous blood pressure monitoring and blood sampling needed.    A 20 gauge (size), 1 and 1/4 inch (length), Angiocath (type) catheter was placed into the Right radial artery, secured by Tegaderm,   Seldinger technique not used.  Events:  patient tolerated procedure well with no complications.      Additional notes:  Chlorohexadine prep.  Sterile gauze and gloves used.  Good waveform.  No abnormalities or hematoma.

## 2025-01-30 NOTE — ANESTHESIA PREPROCEDURE EVALUATION
"Patient: Jaja Bradshaw    Procedure Information       Date/Time: 01/30/25 1410    Procedure: MEDIASTINOSCOPY (Chest)    Location: [facility] OR 14 / [facility] OR    Surgeons: Vickie Gonzalez DO        69 y.o. female, current smoker (0.5ppd x ~50 years), presenting with a right paratracheal lesion presenting for mediastinoscopy.     ALLERGIES:  Allergies   Allergen Reactions    Codeine Other        MEDICAL HISTORY:  No past medical history on file.     Relevant Problems   Cardiac   (+) Benign essential hypertension      Pulmonary   (+) Chronic obstructive pulmonary disease (Multi)      Endocrine   (+) Hypothyroidism        SURGICAL HISTORY:  Past Surgical History:   Procedure Laterality Date    OTHER SURGICAL HISTORY  04/17/2020    Tubal ligation        MEDICATIONS:  Current Outpatient Medications   Medication Instructions    aspirin 81 mg EC tablet 1 tablet, Daily    cholecalciferol (VITAMIN D3) 2,000 Units, 5 times daily    gabapentin (NEURONTIN) 100 mg, oral, 3 times daily    levothyroxine (SYNTHROID, LEVOXYL) 100 mcg, oral, Daily, Take on an empty stomach at the same time each day, either 30 to 60 minutes prior to breakfast    methocarbamol (ROBAXIN) 500 mg, oral, 4 times daily PRN    oxyCODONE (ROXICODONE) 5 mg, oral, Every 6 hours PRN    rOPINIRole (REQUIP) 1 mg, oral, 3 times daily        VITALS:      1/23/2025     9:13 AM 1/21/2025     1:07 PM 12/19/2024     9:21 AM   Vitals   Systolic 136 133 125   Diastolic 90 69 86   BP Location   Right arm   Heart Rate 96 105 86   Temp 36.4 °C (97.5 °F) 36.6 °C (97.9 °F) 32.4 °C (90.3 °F)   Resp   16   Height 1.626 m (5' 4\") 1.626 m (5' 4\")    Weight (lb) 92.6 93.4 97   BMI 15.89 kg/m2 16.03 kg/m2 16.65 kg/m2   BSA (m2) 1.38 m2 1.38 m2 1.41 m2   Visit Report Report Report Report       LABS:   BMP   Lab Results   Component Value Date    GLUCOSE 104 (H) 12/02/2024    CALCIUM 9.3 12/02/2024     12/02/2024    K 3.5 12/02/2024    CO2 28 12/02/2024    CL " 101 12/02/2024    BUN 7 12/02/2024    CREATININE 0.67 12/02/2024   , CBC  Lab Results   Component Value Date    WBC 7.9 12/02/2024    HGB 14.2 12/02/2024    HCT 41.6 12/02/2024    MCV 96 12/02/2024     12/02/2024          IMAGES:  EKG          Encounter Date: 11/01/24   ECG 12 Lead   Result Value    Ventricular Rate 83    Atrial Rate 82    AK Interval 145    QRS Duration 115    QT Interval 374    QTC Calculation(Bazett) 440    P Axis 66    R Axis -46    T Axis -33    QRS Count 14    Q Onset 255    T Offset 442    QTC Fredericia 417    Narrative    Sinus rhythm  LAE, consider biatrial enlargement  Nonspecific IVCD with LAD  Inferior infarct, age indeterminate  Anterior infarct, age indeterminate  Baseline wander in lead(s) V1    See ED provider note for full interpretation and clinical correlation  Confirmed by Nithin Jimenez ([id number]) on 11/3/2024 12:08:08 PM          SOCIAL:  Social History     Tobacco Use   Smoking Status Every Day    Current packs/day: 0.50    Average packs/day: 0.5 packs/day for 45.1 years (22.5 ttl pk-yrs)    Types: Cigarettes    Start date: 1980    Passive exposure: Current   Smokeless Tobacco Never      Social History     Substance and Sexual Activity   Alcohol Use Yes    Alcohol/week: 2.0 standard drinks of alcohol    Types: 2 Cans of beer per week    Comment: occasionally      Social History     Substance and Sexual Activity   Drug Use Never        NPO STATUS:  No data recorded    Clinical Areas Reviewed:                   Anesthesia Assessment:    Physical Exam    Airway  Mallampati: II  TM distance: >3 FB  Neck ROM: full     Cardiovascular   Rhythm: regular  Rate: normal     Dental    Pulmonary   Comments: Non labored      Abdominal            Anesthesia Plan    History of general anesthesia?: yes  History of complications of general anesthesia?: yes    ASA 4   (H/o PONV. Discussed GA and invasive monitoring. )  The patient is a current smoker.    intravenous induction    Postoperative administration of opioids is intended.  Trial extubation is planned.  Anesthetic plan and risks discussed with patient.  Use of blood products discussed with patient who consented to blood products.    Plan discussed with CAA and CRNA.

## 2025-01-30 NOTE — ANESTHESIA PROCEDURE NOTES
Airway  Date/Time: 1/30/2025 1:34 PM  Urgency: elective      Staffing  Performed: LACEY   Authorized by: Sally Sheffield MD    Performed by: LACEY Oh  Patient location during procedure: OR    Indications and Patient Condition  Indications for airway management: anesthesia  Spontaneous ventilation: present  Sedation level: deep  Preoxygenated: yes  Patient position: sniffing  Mask difficulty assessment: 1 - vent by mask    Final Airway Details  Final airway type: endotracheal airway      Successful airway: ETT  Cuffed: yes   Successful intubation technique: direct laryngoscopy  Blade: Sterling  Blade size: #3  ETT size (mm): 7.0  Cormack-Lehane Classification: grade I - full view of glottis  Placement verified by: chest auscultation and capnometry   Measured from: lips  ETT to lips (cm): 19  Number of attempts at approach: 1  Ventilation between attempts: BVM    Additional Comments  Lips in preop condition.

## 2025-01-30 NOTE — OP NOTE
CERVICAL MEDIASTINOSCOPY Operative Note     Date: 2025  OR Location: [facility] OR    Name: Jaja Bradshaw, : 1955, Age: 69 y.o., MRN: [id number], Sex: female    Diagnosis  Pre-op Diagnosis      * Mediastinal lymphadenopathy [R59.0] Post-op Diagnosis     * Mediastinal lymphadenopathy [R59.0]     Procedures  CERVICAL MEDIASTINOSCOPY  18740 - AL MEDIASTINOSCOPY WITH LYMPH NODE BIOPSY/IES      Surgeons      * Vickie Gonzalez - Primary    Resident/Fellow/Other Assistant:  Surgeons and Role:     * Robert Saenz MD - Assisting    Staff:   Relief Circulator: Holley Shore Scrub: Aylin  Circulator: Amber  Scrub Person: Eileen Shore Circulator: Jaki    Anesthesia Staff: Anesthesiologist: Sally Sheffield MD  C-AA: LACEY Oh    Procedure Summary  Anesthesia: General  ASA: IV  Estimated Blood Loss: 75mL  Intra-op Medications:   Administrations occurring from 1315 to 1430 on 25:   Medication Name Total Dose   sodium chloride 0.9 % irrigation solution 1,000 mL   albumin human bottle 25% Cannot be calculated   aprepitant (Emend) 40 mg capsule 40 mg   ceFAZolin (Ancef) vial 1 g 2 g   dexAMETHasone (Decadron) 4 mg/mL 10 mg   dexmedeTOMIDine 4 mcg/mL in 100 mL NS infusion 14.45 mcg   fentaNYL (Sublimaze) injection 50 mcg/mL 50 mcg   LR bolus Cannot be calculated   lidocaine (cardiac) injection 2% prefilled syringe 40 mg   methadone (Dolophine) injection 5 mg   midazolam PF (Versed) injection 1 mg/mL 2 mg   phenylephrine 40 mcg/mL syringe 10 mL 680 mcg   propofol (Diprivan) injection 10 mg/mL 150 mg   rocuronium (ZeMuron) 50 mg/5 mL injection 70 mg   citric acid-sodium citrate solution (Bicitra) solution 30 mL              Anesthesia Record               Intraprocedure I/O Totals          Intake    Dexmedetomidine 0.00 mL    The total shown is the total volume documented since Anesthesia Start was filed.    LR bolus 400.00 mL    albumin human bottle 25% 50.00 mL    Total Intake 450 mL        Output    Est. Blood Loss 5 mL    Total Output 5 mL       Net    Net Volume 445 mL          Specimen:   ID Type Source Tests Collected by Time   1 : LEVEL 7 LYMPH NODE Tissue LYMPH NODE PULMONARY (SPECIFY SITE) SURGICAL PATHOLOGY EXAM Vickie Gonzalez, DO 1/30/2025 1406   2 : LEVEL 7 LYMPH NODE Tissue LYMPH NODE PULMONARY (SPECIFY SITE) SURGICAL PATHOLOGY EXAM Vickie Duncani, DO 1/30/2025 1410   3 : LEVEL 4L LYMPH NODE Tissue LYMPH NODE PULMONARY (SPECIFY SITE) SURGICAL PATHOLOGY EXAM Vickie Duncani, DO 1/30/2025 1419   4 : LEVEL 4R LYMPH NODE Tissue LYMPH NODE PULMONARY (SPECIFY SITE) SURGICAL PATHOLOGY EXAM Vickie Duncani, DO 1/30/2025 1429   5 : LEVEL 7 LYMPH NODE Tissue LYMPH NODE PULMONARY (SPECIFY SITE) FLOW CYTOMETRY TEST Vickie Duncani, DO 1/30/2025 1435   6 : RIGHT PARATRACHEAL MASS Tissue MEDIASTINUM MASS SURGICAL PATHOLOGY EXAM Vickie Duncani, DO 1/30/2025 1441   7 : RIGHT PARATRACHEAL MASS Tissue MEDIASTINUM MASS SURGICAL PATHOLOGY EXAM Vickie Duncani, DO 1/30/2025 1442   8 : RIGHT PARATRACHEAL MASS Tissue LYMPH NODE PULMONARY (SPECIFY SITE) FLOW CYTOMETRY TEST Vickie Duncan, DO 1/30/2025 1442                 Drains and/or Catheters: * None in log *    Tourniquet Times:         Implants:     Findings: preliminary pathology showed no carcinoma    Indications: Jaja Bradshaw is an 69 y.o. female who is having surgery for Mediastinal lymphadenopathy [R59.0]. This was particularly notable along the Rt 4R space with a questionable mass vs enlarged lymph node in this area. This was significantly PET avid and we discussed mediastinoscopy to obtain biopsies, particularly larger tissue to evaluate for possible lymphoma.     The patient was seen in the preoperative area. The risks, benefits, complications, treatment options, non-operative alternatives, expected recovery and outcomes were discussed with the patient. The possibilities of reaction to medication, pulmonary  aspiration, injury to surrounding structures, bleeding, recurrent infection, the need for additional procedures, failure to diagnose a condition, and creating a complication requiring transfusion or operation were discussed with the patient. The patient concurred with the proposed plan, giving informed consent.  The site of surgery was properly noted/marked if necessary per policy. The patient has been actively warmed in preoperative area. Preoperative antibiotics have been ordered and given within 1 hours of incision. Venous thrombosis prophylaxis have been ordered including bilateral sequential compression devices and chemical prophylaxis    Procedure Details: Patient was brought to the operating suite procedural information was confirmed.  General anesthesia was induced and a single-lumen endotracheal tube was placed.  The neck and chest was prepped and draped in typical sterile fashion.  A 1.5 cm incision was made just above the sternal notch.  Subcutaneous tissue and the platysma were transected, the strap muscles were  and split down the median raphae to the level of the pretracheal fascia which was then incised.  Blunt finger dissection was performed along the anterior trachea and the video mediastinoscope was inserted.  We carefully dissected down to the subcarinal space and removed a level 7 lymph node, this had been mildly PET avid and was sent for frozen section which showed no obvious carcinoma.  Additional specimen was sent for fresh flow cytometry.  We sampled a small level 4L lymph node and sent this for permanent pathology.  We then dissected thoroughly along the right paratracheal space.  We sampled a 4R lymph node and sent this for permanent pathology as well as a larger lesion in the paratracheal space -we obtained biopsies for frozen pathology as well as fresh lymphoma protocol.  Surgicel had been placed at the level 7 dissection site.  There was no obvious bleeding otherwise.  Once  hemostasis was confirmed local anesthesia was injected and a single suture was placed in the strap muscles.  The platysma was closed with interrupted sutures followed by running subcuticular.  Patient was extubated and taken to PACU in stable condition.    Complications:  None; patient tolerated the procedure well.    Disposition: PACU - hemodynamically stable.  Condition: stable                 Additional Details: n/a    Attending Attestation: I was present and scrubbed for the key portions of the procedure.    Vickie Gonzalez  Phone Number: [phone]

## 2025-01-30 NOTE — BRIEF OP NOTE
Date: 2025  OR Location: [facility] OR    Name: Jaja Bradshaw, : 1955, Age: 69 y.o., MRN: [id number], Sex: female    Diagnosis  Pre-op Diagnosis      * Mediastinal lymphadenopathy [R59.0] Post-op Diagnosis     * Mediastinal lymphadenopathy [R59.0]     Procedures  CERVICAL MEDIASTINOSCOPY  22764 - LA MEDIASTINOSCOPY WITH LYMPH NODE BIOPSY/IES      Surgeons      * Vickie Gonzalez - Primary    Resident/Fellow/Other Assistant:  Surgeons and Role:     * Robert Saenz MD - Assisting    Staff:   Relief Circulator: Holley Shore Scrub: Aylin  Circulator: Amber  Scrub Person: Eileen Shore Circulator: Jaki    Anesthesia Staff: Anesthesiologist: Sally Sheffield MD  C-AA: LACEY Oh    Procedure Summary  Anesthesia: General  ASA: IV  Estimated Blood Loss: 5 mL  Intra-op Medications:   Administrations occurring from 1315 to 1430 on 25:   Medication Name Total Dose   sodium chloride 0.9 % irrigation solution 1,000 mL   albumin human bottle 25% Cannot be calculated   aprepitant (Emend) 40 mg capsule 40 mg   ceFAZolin (Ancef) vial 1 g 2 g   dexAMETHasone (Decadron) 4 mg/mL 10 mg   dexmedeTOMIDine 4 mcg/mL in 100 mL NS infusion 14.45 mcg   fentaNYL (Sublimaze) injection 50 mcg/mL 50 mcg   LR bolus Cannot be calculated   lidocaine (cardiac) injection 2% prefilled syringe 40 mg   methadone (Dolophine) injection 5 mg   midazolam PF (Versed) injection 1 mg/mL 2 mg   phenylephrine 40 mcg/mL syringe 10 mL 680 mcg   propofol (Diprivan) injection 10 mg/mL 150 mg   rocuronium (ZeMuron) 50 mg/5 mL injection 70 mg   citric acid-sodium citrate solution (Bicitra) solution 30 mL              Anesthesia Record               Intraprocedure I/O Totals          Intake    Dexmedetomidine 0.00 mL    The total shown is the total volume documented since Anesthesia Start was filed.    LR bolus 400.00 mL    albumin human bottle 25% 50.00 mL    Total Intake 450 mL       Output    Est. Blood Loss 5 mL    Total  Output 5 mL       Net    Net Volume 445 mL          Specimen:   ID Type Source Tests Collected by Time   1 : LEVEL 7 LYMPH NODE Tissue LYMPH NODE PULMONARY (SPECIFY SITE) SURGICAL PATHOLOGY EXAM Vickie CLEMENTE Raymundofaraz, DO 1/30/2025 1406   2 : LEVEL 7 LYMPH NODE Tissue LYMPH NODE PULMONARY (SPECIFY SITE) SURGICAL PATHOLOGY EXAM Vickie CLEMENTE Raymundoquinni, DO 1/30/2025 1410   3 : LEVEL 4L LYMPH NODE Tissue LYMPH NODE PULMONARY (SPECIFY SITE) SURGICAL PATHOLOGY EXAM Vickie CLEMENTE Raymundoquinni, DO 1/30/2025 1419   4 : LEVEL 4R LYMPH NODE Tissue LYMPH NODE PULMONARY (SPECIFY SITE) SURGICAL PATHOLOGY EXAM Vickie CLEMENTE Raymundoquinni, DO 1/30/2025 1429   5 : LEVEL 7 LYMPH NODE Tissue LYMPH NODE PULMONARY (SPECIFY SITE) FLOW CYTOMETRY TEST Vickie CLEMENTE Raymundofaraz, DO 1/30/2025 1435   6 : RIGHT PARATRACHEAL MASS Tissue MEDIASTINUM MASS SURGICAL PATHOLOGY EXAM Vickie CLEMENTE Raymundofaraz, DO 1/30/2025 1441   7 : RIGHT PARATRACHEAL MASS Tissue MEDIASTINUM MASS SURGICAL PATHOLOGY EXAM Vickie CLEMENTE Raymundofaraz, DO 1/30/2025 1442   8 : RIGHT PARATRACHEAL MASS Tissue LYMPH NODE PULMONARY (SPECIFY SITE) FLOW CYTOMETRY TEST Vickie Fonsecafaraz, DO 1/30/2025 1442                  Findings: Successful C med and biopsy stations 4L, 4R, 7.     Complications:  None; patient tolerated the procedure well.     Disposition: PACU - hemodynamically stable.  Condition: stable  Specimens Collected:   ID Type Source Tests Collected by Time   1 : LEVEL 7 LYMPH NODE Tissue LYMPH NODE PULMONARY (SPECIFY SITE) SURGICAL PATHOLOGY EXAM Vickie CLEMENTE Gonzalez, DO 1/30/2025 1406   2 : LEVEL 7 LYMPH NODE Tissue LYMPH NODE PULMONARY (SPECIFY SITE) SURGICAL PATHOLOGY EXAM Vickie CLEMENTE Raymundofaraz, DO 1/30/2025 1410   3 : LEVEL 4L LYMPH NODE Tissue LYMPH NODE PULMONARY (SPECIFY SITE) SURGICAL PATHOLOGY EXAM Vickie CLEMENTE Gonzalez, DO 1/30/2025 1419   4 : LEVEL 4R LYMPH NODE Tissue LYMPH NODE PULMONARY (SPECIFY SITE) SURGICAL PATHOLOGY EXAM Vickie CLEMENTE Gonzalez, DO 1/30/2025 1429   5 : LEVEL 7 LYMPH NODE Tissue LYMPH NODE  PULMONARY (SPECIFY SITE) FLOW CYTOMETRY TEST Vickie Gonzalez, DO 1/30/2025 1435   6 : RIGHT PARATRACHEAL MASS Tissue MEDIASTINUM MASS SURGICAL PATHOLOGY EXAM Vickie Gonzalez, DO 1/30/2025 1441   7 : RIGHT PARATRACHEAL MASS Tissue MEDIASTINUM MASS SURGICAL PATHOLOGY EXAM Vickie Gonzalez, DO 1/30/2025 1442   8 : RIGHT PARATRACHEAL MASS Tissue LYMPH NODE PULMONARY (SPECIFY SITE) FLOW CYTOMETRY TEST Vickie Gonzalez, DO 1/30/2025 1442     Attending Attestation:     Vickie Gonzalez  Phone Number: [phone]

## 2025-01-31 LAB
BB ANTIBODY IDENTIFICATION: NORMAL
CASE #: NORMAL

## 2025-02-01 LAB — PATH REV-IMMUNOHEMATOLOGY-PR30: NORMAL

## 2025-02-03 LAB
ATRIAL RATE: 121 BPM
P AXIS: 67 DEGREES
PR INTERVAL: 128 MS
Q ONSET: 226 MS
QRS COUNT: 19 BEATS
QRS DURATION: 62 MS
QT INTERVAL: 422 MS
QTC CALCULATION(BAZETT): 599 MS
QTC FREDERICIA: 533 MS
R AXIS: -77 DEGREES
T AXIS: 72 DEGREES
T OFFSET: 437 MS
VENTRICULAR RATE: 121 BPM

## 2025-02-07 DIAGNOSIS — G25.81 RLS (RESTLESS LEGS SYNDROME): ICD-10-CM

## 2025-02-07 DIAGNOSIS — D86.9 SARCOID: Primary | ICD-10-CM

## 2025-02-07 DIAGNOSIS — M79.18 MUSCLE ACHE OF EXTREMITY: ICD-10-CM

## 2025-02-07 RX ORDER — OXYCODONE HYDROCHLORIDE 5 MG/1
5 TABLET ORAL EVERY 6 HOURS PRN
Qty: 20 TABLET | Refills: 0 | Status: SHIPPED | OUTPATIENT
Start: 2025-02-07 | End: 2025-02-14

## 2025-02-11 ENCOUNTER — OFFICE VISIT (OUTPATIENT)
Dept: SURGERY | Facility: CLINIC | Age: 70
End: 2025-02-11
Payer: COMMERCIAL

## 2025-02-11 VITALS
WEIGHT: 87 LBS | SYSTOLIC BLOOD PRESSURE: 145 MMHG | TEMPERATURE: 96.8 F | DIASTOLIC BLOOD PRESSURE: 84 MMHG | BODY MASS INDEX: 14.85 KG/M2 | HEART RATE: 108 BPM | OXYGEN SATURATION: 94 % | HEIGHT: 64 IN

## 2025-02-11 DIAGNOSIS — G25.81 RLS (RESTLESS LEGS SYNDROME): ICD-10-CM

## 2025-02-11 DIAGNOSIS — R52 PAIN: ICD-10-CM

## 2025-02-11 DIAGNOSIS — D38.3 NEOPLASM OF UNCERTAIN BEHAVIOR OF MEDIASTINUM: ICD-10-CM

## 2025-02-11 PROCEDURE — 1159F MED LIST DOCD IN RCRD: CPT | Performed by: STUDENT IN AN ORGANIZED HEALTH CARE EDUCATION/TRAINING PROGRAM

## 2025-02-11 PROCEDURE — 3008F BODY MASS INDEX DOCD: CPT | Performed by: STUDENT IN AN ORGANIZED HEALTH CARE EDUCATION/TRAINING PROGRAM

## 2025-02-11 PROCEDURE — 99211 OFF/OP EST MAY X REQ PHY/QHP: CPT | Mod: 24 | Performed by: STUDENT IN AN ORGANIZED HEALTH CARE EDUCATION/TRAINING PROGRAM

## 2025-02-11 PROCEDURE — 3077F SYST BP >= 140 MM HG: CPT | Performed by: STUDENT IN AN ORGANIZED HEALTH CARE EDUCATION/TRAINING PROGRAM

## 2025-02-11 PROCEDURE — 3079F DIAST BP 80-89 MM HG: CPT | Performed by: STUDENT IN AN ORGANIZED HEALTH CARE EDUCATION/TRAINING PROGRAM

## 2025-02-11 PROCEDURE — 1123F ACP DISCUSS/DSCN MKR DOCD: CPT | Performed by: STUDENT IN AN ORGANIZED HEALTH CARE EDUCATION/TRAINING PROGRAM

## 2025-02-11 RX ORDER — GABAPENTIN 100 MG/1
200 CAPSULE ORAL 3 TIMES DAILY
Qty: 180 CAPSULE | Refills: 5 | Status: SHIPPED | OUTPATIENT
Start: 2025-02-11 | End: 2025-08-10

## 2025-02-11 NOTE — PATIENT INSTRUCTIONS
Dr. Gonzalez will call you with final pathology and discuss next steps.    Referral to rheumatology.  You can call the referral number  to expedite appointment. [phone]

## 2025-02-12 LAB
CELL COUNT (BLOOD): 0.19 X10*3/UL
CELL COUNT (BLOOD): 0.96 X10*3/UL
CELL POPULATIONS: NORMAL
CELL POPULATIONS: NORMAL
DIAGNOSIS: NORMAL
DIAGNOSIS: NORMAL
FLOW DIFFERENTIAL: NORMAL
FLOW DIFFERENTIAL: NORMAL
FLOW TEST ORDERED: NORMAL
FLOW TEST ORDERED: NORMAL
LAB AP ASR DISCLAIMER: NORMAL
LAB TEST METHOD: NORMAL
LAB TEST METHOD: NORMAL
LABORATORY COMMENT REPORT: NORMAL
NUMBER OF CELLS COLLECTED: NORMAL
NUMBER OF CELLS COLLECTED: NORMAL
PATH REPORT.FINAL DX SPEC: NORMAL
PATH REPORT.GROSS SPEC: NORMAL
PATH REPORT.RELEVANT HX SPEC: NORMAL
PATH REPORT.TOTAL CANCER: NORMAL
SIGNATURE COMMENT: NORMAL
SIGNATURE COMMENT: NORMAL
SPECIMEN VIABILITY: NORMAL
SPECIMEN VIABILITY: NORMAL

## 2025-02-13 NOTE — PROGRESS NOTES
Discussed pathology results.     Will refer to Pulm given her strange constellation of sxs in case it's worth treated sarcoid for her.     Will see her back in 3 months with new scans and re-evaluate growth and possibly to consider removal of right paratracheal lesion

## 2025-02-17 NOTE — PROGRESS NOTES
Chief complaint:  Post op    History Of Present Illness  Jaja Bradshaw is a 69 y.o. female, current smoker (0.5ppd x ~50 years), presenting with a right paratracheal lesion, now status post cervical mediastinoscopy on 1/30/2025    Patient continues to report decreased appetite and diffuse bodyaches she has had some mild improvement with Percocet but is still finding it quite difficult to sleep.  She denies any significant issues with after her mediastinoscopy.     By way of review patient was referred by her PCP, Fide Alonzo.Patient reports she had been having several generalized issues including diffuse muscle aches which started in her legs but have started to occur all over. She has also had ~30lb weight loss over the last 3-4 months. She does admit that she previously had dentures fitted which she cannot use secondary to discomfort and increased size. She also had previously lost her sense of taste. Between these 2 things she has not wanted to eat nearly as much as she normally would.   She denies any increased SOB, CP, dysphagia, new cough, wheezing, or other.     No history of MI or CVA. Could walk a mile or climb 2 flights of stairs: yes     Past Medical History  She has no past medical history on file.    Surgical History  She has a past surgical history that includes Other surgical history (04/17/2020) and Mediastinotomy (N/A, 01/30/2025).     Social History  She reports that she has been smoking cigarettes. She started smoking about 45 years ago. She has a 22.6 pack-year smoking history. She has been exposed to tobacco smoke. She has never used smokeless tobacco. She reports current alcohol use of about 2.0 standard drinks of alcohol per week. She reports that she does not use drugs.    Family History  Family history of cancer: Yes  Family History   Problem Relation Name Age of Onset    Liver cancer Mother          Medications    Current Outpatient Medications:     acetaminophen (Tylenol) 500 mg tablet,  "Take 2 tablets (1,000 mg) by mouth every 8 hours if needed for mild pain (1 - 3) or moderate pain (4 - 6)., Disp: 30 tablet, Rfl: 0    aspirin 81 mg EC tablet, Take 1 tablet (81 mg) by mouth once daily., Disp: , Rfl:     cholecalciferol (Vitamin D3) 25 MCG (1000 UT) tablet, Take 2 tablets (2,000 Units) by mouth 5 times a day., Disp: , Rfl:     levothyroxine (Synthroid, Levoxyl) 100 mcg tablet, Take 1 tablet (100 mcg) by mouth early in the morning.. Take on an empty stomach at the same time each day, either 30 to 60 minutes prior to breakfast, Disp: 30 tablet, Rfl: 11    rOPINIRole (Requip) 1 mg tablet, Take 1 tablet (1 mg) by mouth 3 times a day., Disp: 270 tablet, Rfl: 1    gabapentin (Neurontin) 100 mg capsule, Take 2 capsules (200 mg) by mouth 3 times a day., Disp: 180 capsule, Rfl: 5    methocarbamol (Robaxin) 500 mg tablet, Take 1 tablet (500 mg) by mouth 4 times a day as needed for muscle spasms., Disp: 40 tablet, Rfl: 0    Allergies  Latex and Codeine    Review of Systems:  Review of Systems   Constitutional: No fevers, chills, unexpected weight change  HENT: No sore throat, congestion, or nasal drainage  Eyes: No visual changes or eye itching  Respiratory:  see HPI. No cough, worsening dyspnea, wheezing  Cardiac: No chest pain, palpitations, or lower extremity edema  Gastrointestinal: No nausea, vomiting, diarrhea. No abdominal pain  Genitourinary: No dysuria or hematuria  Musculoskeletal: No back pain. No significant myalgias or arthralgias  Neurologic: No headaches, dizziness, or seizures.  Hematologic: No easy bleeding or bruising.  Psychiatric: No anxiety or depression.    Physical Exam:  Physical Exam  /84   Pulse 108   Temp 36 °C (96.8 °F)   Ht 1.626 m (5' 4\")   Wt (!) 39.5 kg (87 lb)   SpO2 94%   BMI 14.93 kg/m²   Constitutional:       General: Patient is not in acute distress.     Appearance: Normal appearance; not ill-appearing.   HENT:      Head: Normocephalic.      Nose: No congestion " or rhinorrhea.   Neck: Cervical mediastinoscopy site C/D/I  Cardiovascular:      Rate and Rhythm: Normal rate and regular rhythm.      Pulses: Normal pulses.   Pulmonary:      Effort: Pulmonary effort is normal. No respiratory distress.  No conversational dyspnea     Breath sounds: No stridor. No wheezing.   Abdominal:      General: There is no distension.      Palpations: Abdomen is soft.      Tenderness: There is no abdominal tenderness.   Musculoskeletal:         General: No swelling, tenderness or deformity. Normal range of motion.      Cervical back: Normal range of motion. No rigidity.   Lymphadenopathy:      Cervical: No cervical adenopathy.   Skin:     General: Skin is warm and dry.   Neurological:      General: No focal deficit present.      Mental Status: Patient is alert and oriented to person, place, and time.   Psychiatric:         Mood and Affect: Mood normal.     Relevant Results    Pathology:  Surgical Pathology Exam: [id number]  Order: [id number]   Collected 1/30/2025 14:06       Status: Final result       Visible to patient: Yes (seen)       Dx: Mediastinal lymphadenopathy    0 Result Notes      Component    FINAL DIAGNOSIS   A-F. LEVELS 7, 4L&R, AND RIGHT PARATRACHEAL MASS, BIOPSIES:  -- PORTIONS OF LYMPHOID TISSUE WITH RARE NONNECROTIZING GRANULOMAS  --NEGATIVE FOR CARCINOMA  --SPECIAL STAINS ARE NEGATIVE FOR MICROORGANISMS  --SEE NOTE.     NOTE: All the lymph nodes and right paratracheal mass biopsies show a similar morphology; and are comprised of small lymphocytes with very rare nonnecrotizing, well circumscribed granulomas.  There is no definitive evidence by morphology and immunohistochemical stains of a lymphoproliferative disorder or carcinoma.  Differential diagnosis is favored to include infectious versus possible sarcoid.  Clinical and radiographic correlation is required.  If lymphadenopathy persists, worsens or remains of clinical concern, additional biopsies and possible resection may  "be of value.     MORPHOLOGY: Parts A through F show similar morphology. Sections show portions of krunal tissue with intact architecture and slightly expanded sinuses with anthracotic-pigment laden macrophages. Lymphocytes are predominantly small and mature with few well-defined follicles and rare germinal centers. A focal small non-necrotic granuloma is noted (part E).     IMMUNOHISTOCHEMISTRY: Performed on block E1, with appropriate controls.  CD3 highlights small T cells predominantly located in the interfollicular zones.  CD20 and PAX5 highlights B cells which are most prominently present as primary follicles and interspersed throughout the interfollicular zones.  Bcl-2 highlights T cells and marginal zone like B cells.  BCL6 highlights scattered cells; no definitive germinal center type cells identified.  CD30 does not highlight any large dysplastic cells.  Ki-67 nuclear proliferation index is low at under 5%.  GMS and AFB special stains are negative for microorganisms.     FLOW CYTOMETRY: Performed on two parts and demonstrated no clonal B-cell or T-cell populations.   Electronically signed by Shaylee Gomez MD on 2/12/2025 at 2031                   Imaging:  Pulmonary Functions Testing Results:    No results found for: \"FEV1\", \"FVC\", \"AHU3PQO\", \"TLC\", \"DLCO\"    CXR personally reviewed     Assessment/Plan   Problem List Items Addressed This Visit             ICD-10-CM    RLS (restless legs syndrome) G25.81    Relevant Medications    gabapentin (Neurontin) 100 mg capsule     Other Visit Diagnoses         Codes    Neoplasm of uncertain behavior of mediastinum     D38.3    Relevant Orders    Referral to Rheumatology    XR chest 2 views    Pain     R52    Relevant Orders    Referral to Rheumatology            Ms. Bradshaw is a pleasant 69 year old female who presents with a 3.0 cm right paratracheal lesion.  She is now status post cervical mediastinoscopy which did not show evidence of lymphoma, showed " necrotizing granulomas which I think may be consistent with sarcoidosis.  Because she has such vague symptoms I will refer her to pulmonology for discussion of treatment.  We have also placed a rheumatology referral for further ongoing diffuse myalgias.  I did discuss with them that I think this paratracheal lesion may be something else and could represent a bronchogenic cyst incompletely evaluated with mediastinoscopy which they understand.  However, I would like to get her current issues under better control prior to undergoing any sort of VATS or robotic resection of this lesion.  We will plan to see her back in a couple months with a new CT scan of the chest to ensure no enlargement of this area      Vickie Gonzalez, DO  Thoracic & Esophageal Surgery

## 2025-02-18 DIAGNOSIS — J39.8 TRACHEAL MASS: ICD-10-CM

## 2025-02-18 DIAGNOSIS — R59.0 MEDIASTINAL LYMPHADENOPATHY: ICD-10-CM

## 2025-03-03 ENCOUNTER — APPOINTMENT (OUTPATIENT)
Dept: CARDIOLOGY | Facility: CLINIC | Age: 70
End: 2025-03-03
Payer: COMMERCIAL

## 2025-03-06 ENCOUNTER — APPOINTMENT (OUTPATIENT)
Dept: PRIMARY CARE | Facility: CLINIC | Age: 70
End: 2025-03-06
Payer: COMMERCIAL

## 2025-03-19 ENCOUNTER — APPOINTMENT (OUTPATIENT)
Dept: RHEUMATOLOGY | Facility: CLINIC | Age: 70
End: 2025-03-19
Payer: COMMERCIAL

## 2025-03-19 VITALS
HEART RATE: 67 BPM | OXYGEN SATURATION: 95 % | TEMPERATURE: 97.4 F | SYSTOLIC BLOOD PRESSURE: 128 MMHG | WEIGHT: 84 LBS | BODY MASS INDEX: 14.42 KG/M2 | DIASTOLIC BLOOD PRESSURE: 92 MMHG

## 2025-03-19 DIAGNOSIS — R52 PAIN: ICD-10-CM

## 2025-03-19 DIAGNOSIS — G62.9 NEUROPATHY: ICD-10-CM

## 2025-03-19 DIAGNOSIS — L92.9 NON-CASEATING GRANULOMA: Primary | ICD-10-CM

## 2025-03-19 DIAGNOSIS — R63.4 UNINTENTIONAL WEIGHT LOSS: ICD-10-CM

## 2025-03-19 DIAGNOSIS — E03.9 HYPOTHYROIDISM, UNSPECIFIED TYPE: ICD-10-CM

## 2025-03-19 PROCEDURE — 1126F AMNT PAIN NOTED NONE PRSNT: CPT

## 2025-03-19 PROCEDURE — 1123F ACP DISCUSS/DSCN MKR DOCD: CPT

## 2025-03-19 PROCEDURE — 3074F SYST BP LT 130 MM HG: CPT

## 2025-03-19 PROCEDURE — 99204 OFFICE O/P NEW MOD 45 MIN: CPT

## 2025-03-19 PROCEDURE — 1159F MED LIST DOCD IN RCRD: CPT

## 2025-03-19 PROCEDURE — 3080F DIAST BP >= 90 MM HG: CPT

## 2025-03-19 RX ORDER — PREDNISONE 10 MG/1
TABLET ORAL
Qty: 79 TABLET | Refills: 0 | Status: SHIPPED | OUTPATIENT
Start: 2025-03-19 | End: 2025-05-16

## 2025-03-19 ASSESSMENT — PAIN SCALES - GENERAL: PAINLEVEL_OUTOF10: 0-NO PAIN

## 2025-03-19 NOTE — PROGRESS NOTES
Rheumatology Note      Patient Jaja Bradshaw  MRN [id number]     CC: Ms. Jaja Bradshaw is a 69 y.o. female with history of COPD, hypertension, hypothyroidism, smoking, osteopenia who was recently found to have right paratracheal lesion s/p cervical mediastinoscopy showing rare non-necrotizing granulomas here for evaluation of diffuse pains    Subjective    Subjective   History of Presenting Illness  Patient reports pain started in feet and moved up legs, now in hands and arms. That is her worst problem. Patient has lost her sense of smell and taste (no infections) so she has stopped eating and has lost 40 lbs in the past 5 months. Patient is dizzy and lightheaded all of the time which is causing a lot of problems now, lasts a few minutes all throughout the day.    Patient not sleeping due to pain, wakes her up. Constant burning, tingling, itchy pain with sharp, shooting pains. Hand pain up to her elbow, foot pain up to thigh. None of the pain medications have worked. Has motrin 800 TID or Tylenol seems to help somewhat for short period of time. Has tried opioids and gabapentin. Hands and feet are most bothersome. Difficulty grasping objects due to weakness and dropping things with sudden pains. No swelling in joints or tenderness, no stiffness. Her feet feel hot but when touched are ice cold.     Patient was having symptoms of rapid weight loss over 3 months, facial numbness, bilateral lower extremity pain and numbness, pleuritic chest pain. ABIs unremarkable. CT chest completed which showed peritracheal mass and was referred to thoracic surgery. PET CT showed FDG avid right paratracheal and subcarinal nodes, concerning for  neoplastic process. s/p mediastinoscopy with lymph node biopsy 1/30/2025 which showed small lymphocytes with very rare nonnecrotizing, well circumscribed granulomas.    ROS:  Constitutional: Denies fever, night sweats, +fatigue due to lack of sleep  Head: Denies headaches or patchy hair  loss  Eyes: Denies dry eyes, blurry vision, redness of the eyes, pain in the eyes or H/O uveitis  ENT: Denies dry mouth, sores in the mouth, or difficulty swallowing. Constant dry mouth, drinks a tons of water and nothing really helps. Swallowing has been more difficult. Her dentures have gotten too big and has to chew up a lot. Having problems with swallowing pills. Going on the last month  Cardiovascular: Denies chest pain, palpitations  Respiratory: Denies shortness of breath or wheezing. Chronic cough  Gastrointestinal: Denies nausea, vomiting, abdominal pain, diarrhea or blood in the stool. +heartburn (chronic)  Genitourinary: No urinary urgency, frequency, dysuria   Integumentary: +photosensitivity all her life, no rash or lesions, Raynaud's or psoriasis  Neurological: Per HPI  Hematologic/Lymphatic: Denies blood clots. Pregnant 4 times, delivered 3 times. One miscarriage at 4 months  MSK: As per HPI. No sausage finger, finger tip ulceration, or back pain    Smoking: History of smoking all her life, takes about 1.5 days to get through a PPD  Alcohol: Beer occasional  Family history: Mother with RA, history of thyroid issues     No past medical history on file.     Allergies   Allergen Reactions    Latex Itching    Codeine Other        Objective   Objective     BP (!) 128/92   Pulse 67   Temp 36.3 °C (97.4 °F) (Temporal)   Wt (!) 38.1 kg (84 lb)   SpO2 95%   BMI 14.42 kg/m²      PHYSICAL EXAM:  General - temporal wasting, very thin  Head: Normocephalic, atraumatic  Eyes - PERRLA, EOMI. No conjunctiva injection.   Mouth/ENT - Moist oral and nasal mucosa. No facial rash. No enlarged parotid or submandibular gland. Adequate salivary pooling.  Skin - No rashes or ulcers.   Extremities - No edema, cyanosis ,or clubbing  Neurological - Alert and oriented x3,  grossly intact. No focal deficit.    Musculoskeletal - generalized weakness   Shoulders: Full ROM, without pain, no swelling, warmth or  "tenderness.  Elbows: Full ROM, without pain, no swelling, warmth or tenderness.  Wrists/Hands: Full ROM, without pain, no swelling, warmth or tenderness.  Sacroiliac joints: No local tenderness.   Hips: Full ROM.  No malalignment.  Knees:  Full ROM, without pain, no swelling, warmth or point tenderness. No joint line tenderness, no pes anserine tenderness.  Ankles: Full ROM, without pain, swelling, warmth or tenderness.  Toes: No swelling, warmth or tenderness. Metatarsal squeeze negative  Cervical spine: No tenderness or limitation of movement  Lumbar spine: No tenderness or limitation of movement     LABS:  Lab Results   Component Value Date    WBC 7.9 12/02/2024    HGB 14.2 12/02/2024    HCT 41.6 12/02/2024    MCV 96 12/02/2024     12/02/2024      Lab Results   Component Value Date    GLUCOSE 104 (H) 12/02/2024    CO2 28 12/02/2024    BUN 7 12/02/2024    EGFR >90 12/02/2024    CALCIUM 9.3 12/02/2024    ALBUMIN 4.0 12/02/2024      No results found for: \"CRP\"  Lab Results   Component Value Date    SEDRATE 6 12/02/2024    SEDRATE 1 08/04/2019     No results found for: \"C3\", \"C4\"  No results found for: \"RF\", \"CITAB\"  Lab Results   Component Value Date    ANATITER 1:320 12/02/2024    ARNP 0.3 12/02/2024    ASMRN <0.2 12/02/2024    ASSA <0.2 12/02/2024    ASSB <0.2 12/02/2024    ASCL <0.2 12/02/2024    JO1 <0.2 12/02/2024    ACHR <0.2 12/02/2024    ACEN <0.2 12/02/2024    RIBPP <0.2 08/04/2019    DNADS <1.0 12/02/2024     Lab Results   Component Value Date    PROTUR NEGATIVE 11/01/2024    GLUCOSEU Normal 11/01/2024    BLOODU NEGATIVE 11/01/2024    KETONESU NEGATIVE 11/01/2024    NITRITEU NEGATIVE 11/01/2024    LEUKOCYTESU NEGATIVE 11/01/2024     No results found for: \"UTPCR\"     Lab Results   Component Value Date    URICACID 3.8 12/02/2024     No results found for: \"COLORFL\", \"CLARITYFLUID\", \"WBCFL\", \"NEUTROBFREL\", \"LYMPHSBFREL\", \"EOSBFREL\", \"BASOBFREL\", \"PLASMACFLD\", \"RBCFL\", \"CRYSFL\"    IMAGING:  CT chest " 1/10/2025:  Interval development of a soft tissue mass versus enlarged lymph node  in the right paratracheal region, worrisome for underlying  malignancy. Etiology is uncertain. No suspicious lung nodule or mass  noted. This will require follow-up/further evaluation.    PET CT lung 1/24/2025  1. FDG avid right paratracheal and subcarinal nodes, concerning for  neoplastic process. Correlation with tissue biopsy is recommended.  2. No other concerning FDG avid disease identified.    CXR 1/30/2025  1. Trace right pleural effusion.  2. Enlarging right hilar fullness.  3. Findings of chronic lung disease.    DEXA 12/16/2024:  SPINE L1-L4  Bone Mineral Density: 0.83  T-Score -2  Z-Score 0.0      LEFT FEMUR -TOTAL  Bone Mineral Density: 0.705  T-Score -1.9   Z-Score  -0.6      LEFT FEMUR -NECK  Bone Mineral Density: 0.62  T-Score -2.1  Z-Score -0.4      10-year Fracture Risk (%):  Major Osteoporotic Fracture  9.5  Hip Fracture                        2.9      IMPRESSION:  DEXA:  According to World Health Organization criteria,  classification is low bone mass (osteopenia)    Pathology 1/30/2025:  A-F. LEVELS 7, 4L&R, AND RIGHT PARATRACHEAL MASS, BIOPSIES:  -- PORTIONS OF LYMPHOID TISSUE WITH RARE NONNECROTIZING GRANULOMAS  --NEGATIVE FOR CARCINOMA  --SPECIAL STAINS ARE NEGATIVE FOR MICROORGANISMS  --SEE NOTE.     NOTE: All the lymph nodes and right paratracheal mass biopsies show a similar morphology; and are comprised of small lymphocytes with very rare nonnecrotizing, well circumscribed granulomas.  There is no definitive evidence by morphology and immunohistochemical stains of a lymphoproliferative disorder or carcinoma.  Differential diagnosis is favored to include infectious versus possible sarcoid.  Clinical and radiographic correlation is required.  If lymphadenopathy persists, worsens or remains of clinical concern, additional biopsies and possible resection may be of value.     MORPHOLOGY: Parts A through F show  similar morphology. Sections show portions of krunal tissue with intact architecture and slightly expanded sinuses with anthracotic-pigment laden macrophages. Lymphocytes are predominantly small and mature with few well-defined follicles and rare germinal centers. A focal small non-necrotic granuloma is noted (part E).     IMMUNOHISTOCHEMISTRY: Performed on block E1, with appropriate controls.  CD3 highlights small T cells predominantly located in the interfollicular zones.  CD20 and PAX5 highlights B cells which are most prominently present as primary follicles and interspersed throughout the interfollicular zones.  Bcl-2 highlights T cells and marginal zone like B cells.  BCL6 highlights scattered cells; no definitive germinal center type cells identified.  CD30 does not highlight any large dysplastic cells.  Ki-67 nuclear proliferation index is low at under 5%.  GMS and AFB special stains are negative for microorganisms.     FLOW CYTOMETRY: Performed on two parts and demonstrated no clonal B-cell or T-cell populations.    Assessment    Assessment & Plan   Ms. Jaja Bradshaw is a 69 y.o. female with history of COPD, hypertension, hypothyroidism, smoking who was recently found to have right paratracheal lesion s/p cervical mediastinoscopy showing rare non-necrotizing granulomas here for evaluation of diffuse myalgias.    Labs from 12/2024-1/2025 reviewed: +ABNER 1:320 with negative ARSH panel (positive ABNER 1:40 in 2019), normal sedimentation rate.    Impression: Patient with significant weight loss, current BMI 14.4 and neuropathic pains. Recently found to have rare non-necrotizing granulomas after biopsy of right paratracheal lesion concerning for sarcoidosis. PET/CT completed which showed active right paratracheal and subcarinal nodes, no other activity. Biopsy was negative for malignancy. There is a possibility that symptoms may be related to sarcoidosis, however needs thorough work up to make sure occult  malignancy or infection is ruled out. PET/CT is reassuring that findings are limited to paratracheal lesion.    #Non-necrotizing granulomas  #Neuropathy  #Unintentional weight loss  - Trial steroids at 20 mg daily x2 weeks (0.5 mg/kg), followed by 15 mg daily x2 weeks, followed by 10 mg daily until next follow up  - Further work up including ESR, CRP, SPEP, CK, ACE, RF, anti-CCP, TB test. EMG for further evaluation of neuropathic pain  - Anticipate likely starting methotrexate if responsive to prednisone    #Osteopenia  - Osteopenia with lowest T score -2.1 of the left femoral neck with FRAX major fracture 9.5%, hip fracture 2.9%  - Calcium + vitamin D    RTC in 6 weeks    Case seen and discussed with Dr. Terry  Signature: Neli Ovalles,   Date: March 19, 2025

## 2025-03-19 NOTE — PATIENT INSTRUCTIONS
- Start prednisone at 20 mg daily for 2 weeks, then 15 mg for 2 weeks, then 10 mg until follow up   - Get labs done today  - Schedule EMG - please [phone] to schedule    Follow up in 6 weeks    Phone number for rheumatology clinic: [phone] or use [employer] to reach out

## 2025-03-19 NOTE — PROGRESS NOTES
I saw and evaluated the patient. I personally obtained the key and critical portions of the history and physical exam or was physically present for key and critical portions performed by the trainee. I reviewed the trainee's documentation and discussed the patient with the trainee. I agree with the trainee's medical decision making, as documented on the trainee's note.     Alphonso Terry MD

## 2025-03-20 LAB
T4 FREE SERPL-MCNC: 1.9 NG/DL (ref 0.8–1.8)
TSH SERPL-ACNC: 0.06 MIU/L (ref 0.4–4.5)

## 2025-03-21 ENCOUNTER — APPOINTMENT (OUTPATIENT)
Dept: PRIMARY CARE | Facility: CLINIC | Age: 70
End: 2025-03-21
Payer: COMMERCIAL

## 2025-03-21 VITALS
HEART RATE: 81 BPM | RESPIRATION RATE: 16 BRPM | WEIGHT: 85.8 LBS | DIASTOLIC BLOOD PRESSURE: 85 MMHG | HEIGHT: 64 IN | OXYGEN SATURATION: 97 % | TEMPERATURE: 98.4 F | SYSTOLIC BLOOD PRESSURE: 150 MMHG | BODY MASS INDEX: 14.65 KG/M2

## 2025-03-21 DIAGNOSIS — G89.4 CHRONIC PAIN SYNDROME: Primary | ICD-10-CM

## 2025-03-21 DIAGNOSIS — E03.9 HYPOTHYROIDISM, UNSPECIFIED TYPE: ICD-10-CM

## 2025-03-21 PROCEDURE — 3079F DIAST BP 80-89 MM HG: CPT | Performed by: NURSE PRACTITIONER

## 2025-03-21 PROCEDURE — 3077F SYST BP >= 140 MM HG: CPT | Performed by: NURSE PRACTITIONER

## 2025-03-21 PROCEDURE — 1159F MED LIST DOCD IN RCRD: CPT | Performed by: NURSE PRACTITIONER

## 2025-03-21 PROCEDURE — G2211 COMPLEX E/M VISIT ADD ON: HCPCS | Performed by: NURSE PRACTITIONER

## 2025-03-21 PROCEDURE — 99214 OFFICE O/P EST MOD 30 MIN: CPT | Performed by: NURSE PRACTITIONER

## 2025-03-21 PROCEDURE — 1123F ACP DISCUSS/DSCN MKR DOCD: CPT | Performed by: NURSE PRACTITIONER

## 2025-03-21 PROCEDURE — 3008F BODY MASS INDEX DOCD: CPT | Performed by: NURSE PRACTITIONER

## 2025-03-21 RX ORDER — LEVOTHYROXINE SODIUM 88 UG/1
88 TABLET ORAL DAILY
Qty: 90 TABLET | Refills: 1 | Status: SHIPPED | OUTPATIENT
Start: 2025-03-21

## 2025-03-21 ASSESSMENT — ENCOUNTER SYMPTOMS
DIZZINESS: 1
VOMITING: 0
SHORTNESS OF BREATH: 0
CHILLS: 0
PALPITATIONS: 0
FATIGUE: 0
NERVOUS/ANXIOUS: 0
DIARRHEA: 0
WEAKNESS: 0
HEADACHES: 0
SLEEP DISTURBANCE: 0
CONSTIPATION: 0
MYALGIAS: 1
WHEEZING: 0
CONFUSION: 0
FEVER: 0
COUGH: 0

## 2025-03-21 NOTE — ASSESSMENT & PLAN NOTE
Referral to pain management for eval.  Seems like neuropathic pain but patient not responding to gabapentin.  Initial thoughts were also RLS due to patient reporting leg movement at night however unresponsive to Requip

## 2025-03-21 NOTE — PROGRESS NOTES
"Subjective   Patient ID: Jaja Bradshaw is a 69 y.o. female who presents for Hypothyroidism (6 week follow up ) and Peripheral Neuropathy.    Hypothyroidism: Patient with low TSH.  Increased her levothyroxine to 100 mcg from 75 mcg 1/23/2025.  TSH is now low at 0.06.  Recent cervical mediastinoscopy due to peritracheal lesion.    Right peritracheal lesion: 3 cm in size.  Status post cervical mediastinoscopy which showed no evidence of lymphoma but did show non necrotizing granulomas which specialist thoughts are it could be consistent with sarcoidosis.  Recently saw rheumatology as well..   Necrotizing granulomas: Patient seen by rheumatology.  Associated myalgia.  Sed rate unremarkable, rheumatoid factor less than 10, angiotensin-converting enzyme normal, CRP normal, Citrulline antibody IgG normal.  Rheumatology are inclined to start methotrexate if patient is responsive to prednisone    Neuropathy: Trialed steroids.  Unremarkable neuropathic workup.  Does have EMG study-encourage patient to schedule.              Review of Systems   Constitutional:  Negative for chills, fatigue and fever.   Respiratory:  Negative for cough, shortness of breath and wheezing.    Cardiovascular:  Negative for chest pain and palpitations.   Gastrointestinal:  Negative for constipation, diarrhea and vomiting.   Musculoskeletal:  Positive for myalgias.   Neurological:  Positive for dizziness. Negative for weakness and headaches.   Psychiatric/Behavioral:  Negative for confusion, self-injury, sleep disturbance and suicidal ideas. The patient is not nervous/anxious.        Objective   /85   Pulse 81   Temp 36.9 °C (98.4 °F) (Temporal)   Resp 16   Ht 1.626 m (5' 4\")   Wt (!) 38.9 kg (85 lb 12.8 oz)   SpO2 97%   BMI 14.73 kg/m²     Physical Exam  Vitals reviewed.   Constitutional:       Appearance: Normal appearance.   HENT:      Head: Normocephalic.   Cardiovascular:      Rate and Rhythm: Normal rate and regular rhythm.     "  Pulses: Normal pulses.      Heart sounds: Normal heart sounds.   Pulmonary:      Effort: Pulmonary effort is normal. No respiratory distress.      Breath sounds: Normal breath sounds. No wheezing.   Abdominal:      General: Bowel sounds are normal.   Musculoskeletal:         General: Normal range of motion.   Neurological:      General: No focal deficit present.      Mental Status: She is alert and oriented to person, place, and time.   Psychiatric:         Mood and Affect: Mood normal.         Behavior: Behavior normal.         Assessment/Plan   Problem List Items Addressed This Visit             ICD-10-CM    Hypothyroidism E03.9     Decrease levothyroxine to 88 mcg daily  Recheck TSH in 6 weeks  Asymptomatic         Relevant Medications    levothyroxine (Synthroid, Levoxyl) 88 mcg tablet    Other Relevant Orders    Tsh With Reflex To Free T4 If Abnormal    Chronic pain syndrome - Primary G89.4     Referral to pain management for eval.  Seems like neuropathic pain but patient not responding to gabapentin.  Initial thoughts were also RLS due to patient reporting leg movement at night however unresponsive to Requip         Relevant Orders    Referral to Pain Medicine    Referral to Chiropractic Medicine - (External)

## 2025-03-22 LAB
ACE SERPL-CCNC: 38 U/L (ref 9–67)
CCP IGG SERPL-ACNC: <16 UNITS
CK SERPL-CCNC: 34 U/L (ref 20–243)
CRP SERPL-MCNC: <3 MG/L
ERYTHROCYTE [SEDIMENTATION RATE] IN BLOOD BY WESTERGREN METHOD: 2 MM/H
IGNF NEG CNTRL BLD: NORMAL
M TB IFN-G BLD-IMP: NEGATIVE
MITOGEN IGNF.SPOT COUNT BLD: NORMAL
QUEST PANEL A SPOT COUNT: 0
QUEST PANEL B SPOT COUNT: 1
RHEUMATOID FACT SERPL-ACNC: <10 IU/ML

## 2025-03-24 ENCOUNTER — APPOINTMENT (OUTPATIENT)
Dept: PULMONOLOGY | Facility: CLINIC | Age: 70
End: 2025-03-24
Payer: MEDICARE

## 2025-04-03 ENCOUNTER — APPOINTMENT (OUTPATIENT)
Dept: NEUROLOGY | Facility: CLINIC | Age: 70
End: 2025-04-03
Payer: MEDICARE

## 2025-04-15 ENCOUNTER — OFFICE VISIT (OUTPATIENT)
Dept: SURGERY | Facility: CLINIC | Age: 70
End: 2025-04-15
Payer: MEDICARE

## 2025-04-15 ENCOUNTER — HOSPITAL ENCOUNTER (OUTPATIENT)
Dept: RADIOLOGY | Facility: CLINIC | Age: 70
Discharge: HOME | End: 2025-04-15
Payer: MEDICARE

## 2025-04-15 VITALS
TEMPERATURE: 98.2 F | HEIGHT: 64 IN | BODY MASS INDEX: 14.68 KG/M2 | WEIGHT: 86 LBS | HEART RATE: 107 BPM | DIASTOLIC BLOOD PRESSURE: 87 MMHG | SYSTOLIC BLOOD PRESSURE: 146 MMHG | OXYGEN SATURATION: 98 %

## 2025-04-15 DIAGNOSIS — R53.1 WEAKNESS GENERALIZED: Primary | ICD-10-CM

## 2025-04-15 DIAGNOSIS — R59.0 MEDIASTINAL LYMPHADENOPATHY: ICD-10-CM

## 2025-04-15 DIAGNOSIS — J39.8 TRACHEAL MASS: ICD-10-CM

## 2025-04-15 PROCEDURE — 71250 CT THORAX DX C-: CPT

## 2025-04-15 PROCEDURE — 99214 OFFICE O/P EST MOD 30 MIN: CPT | Performed by: STUDENT IN AN ORGANIZED HEALTH CARE EDUCATION/TRAINING PROGRAM

## 2025-04-15 PROCEDURE — 3079F DIAST BP 80-89 MM HG: CPT | Performed by: STUDENT IN AN ORGANIZED HEALTH CARE EDUCATION/TRAINING PROGRAM

## 2025-04-15 PROCEDURE — 3077F SYST BP >= 140 MM HG: CPT | Performed by: STUDENT IN AN ORGANIZED HEALTH CARE EDUCATION/TRAINING PROGRAM

## 2025-04-15 PROCEDURE — 3008F BODY MASS INDEX DOCD: CPT | Performed by: STUDENT IN AN ORGANIZED HEALTH CARE EDUCATION/TRAINING PROGRAM

## 2025-04-15 PROCEDURE — 1123F ACP DISCUSS/DSCN MKR DOCD: CPT | Performed by: STUDENT IN AN ORGANIZED HEALTH CARE EDUCATION/TRAINING PROGRAM

## 2025-04-15 NOTE — PROGRESS NOTES
Chief complaint:  surveillance    History Of Present Illness  Jaja Bradshaw is a 69 y.o. female who presents with surveillance imaging for a right para/pretracheal mass (previous bx showed non-necrotizing granulomas only).     Patient continues to report worsening muscle weakness and diffuse bodyaches. Initially the weakness was just in her legs but now is affecting her arms and hands - she is very limited because of this in her ADLs. Did see Rheumatology - started steroid taper and took for ~1month with no improvement. Nothing seems to help her pain.      By way of review patient was referred by her PCP, Fide Alonzo.Patient reports she had been having several generalized issues including diffuse muscle aches which started in her legs but have started to occur all over. She has also had ~30lb weight loss over the last 3-4 months. She does admit that she previously had dentures fitted which she cannot use secondary to discomfort and increased size. She also had previously lost her sense of taste. Between these 2 things she has not wanted to eat nearly as much as she normally would.   She denies any increased SOB, CP, dysphagia, new cough, wheezing, or other.     No history of MI or CVA. Could walk a mile or climb 2 flights of stairs: yes     Past Medical History  She has no past medical history on file.    Surgical History  She has a past surgical history that includes Other surgical history (04/17/2020) and Mediastinotomy (N/A, 01/30/2025).     Social History  She reports that she has been smoking cigarettes. She started smoking about 45 years ago. She has a 22.6 pack-year smoking history. She has been exposed to tobacco smoke. She has never used smokeless tobacco. She reports current alcohol use of about 2.0 standard drinks of alcohol per week. She reports that she does not use drugs.    Family History  Family history of cancer: Yes  Family History   Problem Relation Name Age of Onset    Liver cancer Mother    "       Medications    Current Outpatient Medications:     acetaminophen (Tylenol) 500 mg tablet, Take 2 tablets (1,000 mg) by mouth every 8 hours if needed for mild pain (1 - 3) or moderate pain (4 - 6)., Disp: 30 tablet, Rfl: 0    aspirin 81 mg EC tablet, Take 1 tablet (81 mg) by mouth once daily., Disp: , Rfl:     cholecalciferol (Vitamin D3) 25 MCG (1000 UT) tablet, Take 2 tablets (2,000 Units) by mouth 5 times a day., Disp: , Rfl:     gabapentin (Neurontin) 100 mg capsule, Take 2 capsules (200 mg) by mouth 3 times a day. (Patient taking differently: Take 1 capsule (100 mg) by mouth 3 times a day.), Disp: 180 capsule, Rfl: 5    levothyroxine (Synthroid, Levoxyl) 88 mcg tablet, Take 1 tablet (88 mcg) by mouth early in the morning.. Take on an empty stomach at the same time each day, either 30 to 60 minutes prior to breakfast, Disp: 90 tablet, Rfl: 1    predniSONE (Deltasone) 10 mg tablet, Take 2 tablets (20 mg) by mouth once daily for 14 days, THEN 1.5 tablets (15 mg) once daily for 14 days, THEN 1 tablet (10 mg) once daily., Disp: 79 tablet, Rfl: 0    Allergies  Latex and Codeine    Review of Systems:  Review of Systems   Constitutional: No fevers, chills, unexpected weight change  HENT: No sore throat, congestion, or nasal drainage  Eyes: No visual changes or eye itching  Respiratory:  see HPI. No cough, worsening dyspnea, wheezing  Cardiac: No chest pain, palpitations, or lower extremity edema  Gastrointestinal: No nausea, vomiting, diarrhea. No abdominal pain  Genitourinary: No dysuria or hematuria  Musculoskeletal: No back pain. No significant myalgias or arthralgias  Neurologic: No headaches, dizziness, or seizures.  Hematologic: No easy bleeding or bruising.  Psychiatric: No anxiety or depression.    Physical Exam:  Physical Exam  /87   Pulse 107   Temp 36.8 °C (98.2 °F)   Ht 1.626 m (5' 4\")   Wt (!) 39 kg (86 lb)   SpO2 98%   BMI 14.76 kg/m²   Constitutional:       General: Patient is not in " acute distress.     Appearance: Normal appearance; not ill-appearing.   HENT:      Head: Normocephalic.      Nose: No congestion or rhinorrhea.   Neck: Cervical mediastinoscopy site C/D/I  Cardiovascular:      Rate and Rhythm: Normal rate and regular rhythm.      Pulses: Normal pulses.   Pulmonary:      Effort: Pulmonary effort is normal. No respiratory distress.  No conversational dyspnea     Breath sounds: No stridor. No wheezing.   Abdominal:      General: There is no distension.      Palpations: Abdomen is soft.      Tenderness: There is no abdominal tenderness.   Musculoskeletal:         General: No swelling, tenderness or deformity. Normal range of motion.      Cervical back: Normal range of motion. No rigidity.   Lymphadenopathy:      Cervical: No cervical adenopathy.   Skin:     General: Skin is warm and dry.   Neurological:      General: No focal deficit present.      Mental Status: Patient is alert and oriented to person, place, and time.   Psychiatric:         Mood and Affect: Mood normal.     Relevant Results    Pathology:  Surgical Pathology Exam: [id number]  Order: [id number]   Collected 1/30/2025 14:06       Status: Final result       Visible to patient: Yes (seen)       Dx: Mediastinal lymphadenopathy    0 Result Notes      Component    FINAL DIAGNOSIS   A-F. LEVELS 7, 4L&R, AND RIGHT PARATRACHEAL MASS, BIOPSIES:  -- PORTIONS OF LYMPHOID TISSUE WITH RARE NONNECROTIZING GRANULOMAS  --NEGATIVE FOR CARCINOMA  --SPECIAL STAINS ARE NEGATIVE FOR MICROORGANISMS  --SEE NOTE.     NOTE: All the lymph nodes and right paratracheal mass biopsies show a similar morphology; and are comprised of small lymphocytes with very rare nonnecrotizing, well circumscribed granulomas.  There is no definitive evidence by morphology and immunohistochemical stains of a lymphoproliferative disorder or carcinoma.  Differential diagnosis is favored to include infectious versus possible sarcoid.  Clinical and radiographic correlation  "is required.  If lymphadenopathy persists, worsens or remains of clinical concern, additional biopsies and possible resection may be of value.     MORPHOLOGY: Parts A through F show similar morphology. Sections show portions of krunal tissue with intact architecture and slightly expanded sinuses with anthracotic-pigment laden macrophages. Lymphocytes are predominantly small and mature with few well-defined follicles and rare germinal centers. A focal small non-necrotic granuloma is noted (part E).     IMMUNOHISTOCHEMISTRY: Performed on block E1, with appropriate controls.  CD3 highlights small T cells predominantly located in the interfollicular zones.  CD20 and PAX5 highlights B cells which are most prominently present as primary follicles and interspersed throughout the interfollicular zones.  Bcl-2 highlights T cells and marginal zone like B cells.  BCL6 highlights scattered cells; no definitive germinal center type cells identified.  CD30 does not highlight any large dysplastic cells.  Ki-67 nuclear proliferation index is low at under 5%.  GMS and AFB special stains are negative for microorganisms.     FLOW CYTOMETRY: Performed on two parts and demonstrated no clonal B-cell or T-cell populations.   Electronically signed by Shaylee Gomez MD on 2/12/2025 at 2031                   Imaging:  Pulmonary Functions Testing Results:    No results found for: \"FEV1\", \"FVC\", \"RSE2MWJ\", \"TLC\", \"DLCO\"    CT chest personally reviewed     Assessment/Plan   Problem List Items Addressed This Visit    None  Visit Diagnoses         Codes    Weakness generalized    -  Primary R53.1    Relevant Orders    Referral to Neurology            Ms. Bradshaw is a 69 year old female who presents with a 3.0 cm right paratracheal lesion.  She is now status post cervical mediastinoscopy which did not show evidence of lymphoma, showed non-necrotizing granuloma only.  She saw  rheumatology for her diffuse myalgias, work up thus far is negative. " She feels her weakness is also worsening and is having dizziness and what sounds like visual auras with black spots. I will therefore refer her to Neurology to aid in work up of her ongoing MSK issues. She will work to get her EMG rescheduled as well.       Vickie Gonzalez, DO  Thoracic & Esophageal Surgery

## 2025-04-15 NOTE — H&P (VIEW-ONLY)
Chief complaint:  surveillance    History Of Present Illness  Jaja Bradshaw is a 69 y.o. female who presents with surveillance imaging for a right para/pretracheal mass (previous bx showed non-necrotizing granulomas only).     Patient continues to report worsening muscle weakness and diffuse bodyaches. Initially the weakness was just in her legs but now is affecting her arms and hands - she is very limited because of this in her ADLs. Did see Rheumatology - started steroid taper and took for ~1month with no improvement. Nothing seems to help her pain.      By way of review patient was referred by her PCP, Fide Alonzo.Patient reports she had been having several generalized issues including diffuse muscle aches which started in her legs but have started to occur all over. She has also had ~30lb weight loss over the last 3-4 months. She does admit that she previously had dentures fitted which she cannot use secondary to discomfort and increased size. She also had previously lost her sense of taste. Between these 2 things she has not wanted to eat nearly as much as she normally would.   She denies any increased SOB, CP, dysphagia, new cough, wheezing, or other.     No history of MI or CVA. Could walk a mile or climb 2 flights of stairs: yes     Past Medical History  She has no past medical history on file.    Surgical History  She has a past surgical history that includes Other surgical history (04/17/2020) and Mediastinotomy (N/A, 01/30/2025).     Social History  She reports that she has been smoking cigarettes. She started smoking about 45 years ago. She has a 22.6 pack-year smoking history. She has been exposed to tobacco smoke. She has never used smokeless tobacco. She reports current alcohol use of about 2.0 standard drinks of alcohol per week. She reports that she does not use drugs.    Family History  Family history of cancer: Yes  Family History   Problem Relation Name Age of Onset    Liver cancer Mother    "       Medications    Current Outpatient Medications:     acetaminophen (Tylenol) 500 mg tablet, Take 2 tablets (1,000 mg) by mouth every 8 hours if needed for mild pain (1 - 3) or moderate pain (4 - 6)., Disp: 30 tablet, Rfl: 0    aspirin 81 mg EC tablet, Take 1 tablet (81 mg) by mouth once daily., Disp: , Rfl:     cholecalciferol (Vitamin D3) 25 MCG (1000 UT) tablet, Take 2 tablets (2,000 Units) by mouth 5 times a day., Disp: , Rfl:     gabapentin (Neurontin) 100 mg capsule, Take 2 capsules (200 mg) by mouth 3 times a day. (Patient taking differently: Take 1 capsule (100 mg) by mouth 3 times a day.), Disp: 180 capsule, Rfl: 5    levothyroxine (Synthroid, Levoxyl) 88 mcg tablet, Take 1 tablet (88 mcg) by mouth early in the morning.. Take on an empty stomach at the same time each day, either 30 to 60 minutes prior to breakfast, Disp: 90 tablet, Rfl: 1    predniSONE (Deltasone) 10 mg tablet, Take 2 tablets (20 mg) by mouth once daily for 14 days, THEN 1.5 tablets (15 mg) once daily for 14 days, THEN 1 tablet (10 mg) once daily., Disp: 79 tablet, Rfl: 0    Allergies  Latex and Codeine    Review of Systems:  Review of Systems   Constitutional: No fevers, chills, unexpected weight change  HENT: No sore throat, congestion, or nasal drainage  Eyes: No visual changes or eye itching  Respiratory:  see HPI. No cough, worsening dyspnea, wheezing  Cardiac: No chest pain, palpitations, or lower extremity edema  Gastrointestinal: No nausea, vomiting, diarrhea. No abdominal pain  Genitourinary: No dysuria or hematuria  Musculoskeletal: No back pain. No significant myalgias or arthralgias  Neurologic: No headaches, dizziness, or seizures.  Hematologic: No easy bleeding or bruising.  Psychiatric: No anxiety or depression.    Physical Exam:  Physical Exam  /87   Pulse 107   Temp 36.8 °C (98.2 °F)   Ht 1.626 m (5' 4\")   Wt (!) 39 kg (86 lb)   SpO2 98%   BMI 14.76 kg/m²   Constitutional:       General: Patient is not in " acute distress.     Appearance: Normal appearance; not ill-appearing.   HENT:      Head: Normocephalic.      Nose: No congestion or rhinorrhea.   Neck: Cervical mediastinoscopy site C/D/I  Cardiovascular:      Rate and Rhythm: Normal rate and regular rhythm.      Pulses: Normal pulses.   Pulmonary:      Effort: Pulmonary effort is normal. No respiratory distress.  No conversational dyspnea     Breath sounds: No stridor. No wheezing.   Abdominal:      General: There is no distension.      Palpations: Abdomen is soft.      Tenderness: There is no abdominal tenderness.   Musculoskeletal:         General: No swelling, tenderness or deformity. Normal range of motion.      Cervical back: Normal range of motion. No rigidity.   Lymphadenopathy:      Cervical: No cervical adenopathy.   Skin:     General: Skin is warm and dry.   Neurological:      General: No focal deficit present.      Mental Status: Patient is alert and oriented to person, place, and time.   Psychiatric:         Mood and Affect: Mood normal.     Relevant Results    Pathology:  Surgical Pathology Exam: [id number]  Order: [id number]   Collected 1/30/2025 14:06       Status: Final result       Visible to patient: Yes (seen)       Dx: Mediastinal lymphadenopathy    0 Result Notes      Component    FINAL DIAGNOSIS   A-F. LEVELS 7, 4L&R, AND RIGHT PARATRACHEAL MASS, BIOPSIES:  -- PORTIONS OF LYMPHOID TISSUE WITH RARE NONNECROTIZING GRANULOMAS  --NEGATIVE FOR CARCINOMA  --SPECIAL STAINS ARE NEGATIVE FOR MICROORGANISMS  --SEE NOTE.     NOTE: All the lymph nodes and right paratracheal mass biopsies show a similar morphology; and are comprised of small lymphocytes with very rare nonnecrotizing, well circumscribed granulomas.  There is no definitive evidence by morphology and immunohistochemical stains of a lymphoproliferative disorder or carcinoma.  Differential diagnosis is favored to include infectious versus possible sarcoid.  Clinical and radiographic correlation  "is required.  If lymphadenopathy persists, worsens or remains of clinical concern, additional biopsies and possible resection may be of value.     MORPHOLOGY: Parts A through F show similar morphology. Sections show portions of krunal tissue with intact architecture and slightly expanded sinuses with anthracotic-pigment laden macrophages. Lymphocytes are predominantly small and mature with few well-defined follicles and rare germinal centers. A focal small non-necrotic granuloma is noted (part E).     IMMUNOHISTOCHEMISTRY: Performed on block E1, with appropriate controls.  CD3 highlights small T cells predominantly located in the interfollicular zones.  CD20 and PAX5 highlights B cells which are most prominently present as primary follicles and interspersed throughout the interfollicular zones.  Bcl-2 highlights T cells and marginal zone like B cells.  BCL6 highlights scattered cells; no definitive germinal center type cells identified.  CD30 does not highlight any large dysplastic cells.  Ki-67 nuclear proliferation index is low at under 5%.  GMS and AFB special stains are negative for microorganisms.     FLOW CYTOMETRY: Performed on two parts and demonstrated no clonal B-cell or T-cell populations.   Electronically signed by Shaylee Gomez MD on 2/12/2025 at 2031                   Imaging:  Pulmonary Functions Testing Results:    No results found for: \"FEV1\", \"FVC\", \"SCX9IMZ\", \"TLC\", \"DLCO\"    CT chest personally reviewed     Assessment/Plan   Problem List Items Addressed This Visit    None  Visit Diagnoses         Codes    Weakness generalized    -  Primary R53.1    Relevant Orders    Referral to Neurology            Ms. Bradshaw is a 69 year old female who presents with a 3.0 cm right paratracheal lesion.  She is now status post cervical mediastinoscopy which did not show evidence of lymphoma, showed non-necrotizing granuloma only.  She saw  rheumatology for her diffuse myalgias, work up thus far is negative. " She feels her weakness is also worsening and is having dizziness and what sounds like visual auras with black spots. I will therefore refer her to Neurology to aid in work up of her ongoing MSK issues. She will work to get her EMG rescheduled as well.       Vickie Gonzalez, DO  Thoracic & Esophageal Surgery

## 2025-04-21 DIAGNOSIS — E03.9 HYPOTHYROIDISM, UNSPECIFIED TYPE: ICD-10-CM

## 2025-04-22 ENCOUNTER — TELEPHONE (OUTPATIENT)
Dept: CARDIOTHORACIC SURGERY | Facility: HOSPITAL | Age: 70
End: 2025-04-22
Payer: MEDICARE

## 2025-04-22 ENCOUNTER — PREP FOR PROCEDURE (OUTPATIENT)
Dept: CARDIOTHORACIC SURGERY | Facility: HOSPITAL | Age: 70
End: 2025-04-22
Payer: MEDICARE

## 2025-04-22 DIAGNOSIS — J98.59 MEDIASTINAL MASS: Primary | ICD-10-CM

## 2025-04-22 RX ORDER — HEPARIN SODIUM 5000 [USP'U]/ML
5000 INJECTION, SOLUTION INTRAVENOUS; SUBCUTANEOUS ONCE
OUTPATIENT
Start: 2025-04-22 | End: 2025-04-22

## 2025-04-22 RX ORDER — CEFAZOLIN SODIUM 2 G/50ML
2 SOLUTION INTRAVENOUS ONCE
OUTPATIENT
Start: 2025-04-22 | End: 2025-04-22

## 2025-04-22 NOTE — TELEPHONE ENCOUNTER
Result Communication    Resulted Orders   CT chest wo IV contrast    Narrative    Interpreted By:  Ramesh Jaramillo,   STUDY:  CT CHEST WO IV CONTRAST;  4/15/2025 1:23 pm      INDICATION:  Signs/Symptoms:follow up surveillance.      COMPARISON:  01/24/2025, 01/10/2025, 02/15/2020 and 09/07/2019.      ACCESSION NUMBER(S):  [id number]      ORDERING CLINICIAN:  ROMA VIRK      TECHNIQUE:  Helical data acquisition of the chest was obtained  without IV  contrast material.  Images were reformatted in axial, coronal, and  sagittal planes.      FINDINGS:  LUNGS AND AIRWAYS:  The trachea and central airways are patent. No endobronchial lesion.      There is again mild biapical pleuroparenchymal scarring and mild to  moderate upper lobe predominant centrilobular and paraseptal  emphysema. No focal consolidation, pleural effusion, or pneumothorax.      Stable size of a 0.5 cm right upper lobe pulmonary nodule (series  205, image 131). No new or enlarging discrete suspicious pulmonary  nodules.      MEDIASTINUM AND BHARAT, LOWER NECK AND AXILLA:  The visualized thyroid gland is within normal limits.      Interval increase in size of a soft tissue mass within the right  paratracheal lymph node station measuring 3.7 x 3.3 x 5.7 cm,  previously measuring 3.2 x 3.2 x 5.0 cm on exam dated 01/10/2025. No  new or enlarging lymph nodes within the mediastinum, bharat or axilla.      There is fluid throughout the esophageal lumen. The esophagus is  otherwise unremarkable in appearance.      HEART AND VESSELS:  The thoracic aorta is of normal course and caliber with  mild-to-moderate vascular calcifications.      Main pulmonary artery and its branches are normal in caliber.      Severe coronary artery calcifications are seen. The study is not  optimized for evaluation of coronary arteries.      The cardiac chambers are not enlarged.      No evidence of pericardial effusion.      UPPER ABDOMEN:  The visualized subdiaphragmatic  structures demonstrate no remarkable  findings.      CHEST WALL AND OSSEOUS STRUCTURES:  The chest wall soft tissues are grossly unremarkable. No acute  osseous abnormalities or suspicious osseous lesions. Mild discogenic  degenerative changes are noted throughout the thoracic spine with  scattered intervertebral disc space narrowing and vertebral body  osteophytosis.        Impression    1.  Interval increase in size of a soft tissue mass within the right  paratracheal lymph node station measuring 3.7 x 3.3 x 5.7 cm,  previously measuring 3.2 x 3.2 x 5.0 cm on exam dated 01/10/2025. No  new sites of metastatic disease within the chest.  2. Stable size of a 0.5 cm right upper lobe pulmonary nodule. No new  or enlarging discrete suspicious pulmonary nodules.  3. Moderate emphysema.  4. Severe coronary artery calcifications.          MACRO:  None      Signed by: Ramesh Jaramillo 4/16/2025 7:37 PM  Dictation workstation:   [id number]       3:01 PM      Results were successfully communicated with the patient's SO and they acknowledged their understanding. Given the final radiology report showing several mm enlargement in all directions would like to proceed with resection because granulomatous disease and PET uptake don't entirely fit + patient's ongoing sxs. Will plan for OR robotic resection 4/30/25 next week at [facility].     Vickie Gonzalez, DO  Thoracic & Esophageal Surgery

## 2025-04-23 DIAGNOSIS — D49.9 NEOPLASM OF UNSPECIFIED BEHAVIOR OF UNSPECIFIED SITE: ICD-10-CM

## 2025-04-23 DIAGNOSIS — R59.0 MEDIASTINAL LYMPHADENOPATHY: ICD-10-CM

## 2025-04-29 ENCOUNTER — ANESTHESIA EVENT (OUTPATIENT)
Dept: OPERATING ROOM | Facility: HOSPITAL | Age: 70
End: 2025-04-29
Payer: MEDICARE

## 2025-04-29 NOTE — ANESTHESIA PREPROCEDURE EVALUATION
Patient: Jaja Bradshaw    Procedure Information       Date/Time: 04/30/25 0745    Procedure: EXCISION, TISSUE, MEDIASTINUM, ROBOT-ASSISTED (Right: Chest)    Location: [facility] OR 14 / [facility] OR    Surgeons: Vickie Gonzalez,             Relevant Problems   Anesthesia   (+) PONV (postoperative nausea and vomiting)      Cardiac  Abnormal EKG postop last surgery, resolved with metoprolol.    (+) Benign essential hypertension      Pulmonary  Mediastinal mass, no trouble laying flat   (+) Chronic obstructive pulmonary disease (Multi)      Neuro  Dizziness and lightheadedness      GI   (+) Dysphagia      /Renal (within normal limits)      Liver (within normal limits)      Endocrine   (+) Hypothyroidism      Hematology (within normal limits)      Musculoskeletal   (+) Chronic pain syndrome      HEENT (within normal limits)  Tingling and numbness in face      Nervous   (+) RLS (restless legs syndrome)      Hematology and Neoplasia   (+) Mediastinal mass       Clinical information reviewed:     Meds               NPO Detail:  No data recorded      Chemistry    Lab Results   Component Value Date/Time     12/02/2024 0958    K 3.5 12/02/2024 0958     12/02/2024 0958    CO2 28 12/02/2024 0958    BUN 7 12/02/2024 0958    CREATININE 0.67 12/02/2024 0958    Lab Results   Component Value Date/Time    CALCIUM 9.3 12/02/2024 0958    ALKPHOS 87 12/02/2024 0958    AST 39 12/02/2024 0958    ALT 21 12/02/2024 0958    BILITOT 0.9 12/02/2024 0958          Lab Results   Component Value Date/Time    WBC 7.9 12/02/2024 0958    HGB 14.2 12/02/2024 0958    HCT 41.6 12/02/2024 0958     12/02/2024 0958     Lab Results   Component Value Date/Time    PROTIME 11.6 03/04/2020 0740    INR 1.0 03/04/2020 0740     Encounter Date: 01/30/25   ECG 12 lead - STAT   Result Value    Ventricular Rate 121    Atrial Rate 121    LA Interval 128    QRS Duration 62    QT Interval 422    QTC Calculation(Bazett) 599    P  Axis 67    R Axis -77    T Axis 72    QRS Count 19    Q Onset 226    T Offset 437    QTC Fredericia 533    Narrative    Sinus tachycardia with Premature supraventricular complexes and with occasional Premature ventricular complexes  Left axis deviation  Inferior infarct , age undetermined  Anteroseptal infarct , age undetermined  T wave abnormality, consider lateral ischemia  Abnormal ECG  When compared with ECG of 01-NOV-2024 11:17,  PREVIOUS ECG IS PRESENT  Confirmed by Jatinder French ([id number]) on 2/3/2025 8:22:26 AM     No echocardiogram results found for the past 12 months      Physical Exam    Airway  Mallampati: III  Neck ROM: full  Mouth opening: 3 or more finger widths     Cardiovascular   Rhythm: irregular  Rate: normal     Dental - normal exam  Comments: edentulous     Pulmonary - normal exam   Abdominal            Anesthesia Plan    History of general anesthesia?: yes  History of complications of general anesthesia?: yes    ASA 3     general   (Arterial line )  intravenous induction   Anesthetic plan and risks discussed with patient.  Use of blood products discussed with patient who consented to blood products.    Plan discussed with resident.

## 2025-04-29 NOTE — PROGRESS NOTES
Pharmacy Medication History Review    Jaja Bradshaw is a 69 y.o. female who is planned to be admitted for Mediastinal mass. Pharmacy called the patient prior to their scheduled procedure and reviewed the patient's [id number] medications for accuracy.    Medications ADDED:  NONE   Medications CHANGED:  NONE   Medications REMOVED:   NONE     Please review updated prior to admission medication list and comments regarding how patient may be taking medications differently by going to Admission tab --> Admission Orders --> Admit Orders / Review prior to admission medications.     Preferred pharmacy, last doses of medications, and allergies to be confirmed with patient by nursing the day of procedure.     [facility]  Pharmacy dispense history  Patient Interview Good historian  Chart Review  Care Everywhere   4/15/25 office visit thoracic surgery Dr. Vickie Gonzalez DO    Below are additional concerns with the patient's PTA list.  She also takes an allergy pill but could not verify which one during interview so it was not added    Sivakumar Guerrero, [facility]   Please reach out via Secure Chat for questions

## 2025-04-30 ENCOUNTER — APPOINTMENT (OUTPATIENT)
Dept: RADIOLOGY | Facility: HOSPITAL | Age: 70
End: 2025-04-30
Payer: MEDICARE

## 2025-04-30 ENCOUNTER — ANESTHESIA (OUTPATIENT)
Dept: OPERATING ROOM | Facility: HOSPITAL | Age: 70
End: 2025-04-30
Payer: MEDICARE

## 2025-04-30 ENCOUNTER — APPOINTMENT (OUTPATIENT)
Dept: CARDIOLOGY | Facility: HOSPITAL | Age: 70
End: 2025-04-30
Payer: MEDICARE

## 2025-04-30 ENCOUNTER — HOSPITAL ENCOUNTER (INPATIENT)
Facility: HOSPITAL | Age: 70
LOS: 2 days | Discharge: HOME | End: 2025-05-02
Attending: STUDENT IN AN ORGANIZED HEALTH CARE EDUCATION/TRAINING PROGRAM | Admitting: STUDENT IN AN ORGANIZED HEALTH CARE EDUCATION/TRAINING PROGRAM
Payer: MEDICARE

## 2025-04-30 DIAGNOSIS — J98.59 MEDIASTINAL MASS: Primary | ICD-10-CM

## 2025-04-30 PROBLEM — R13.10 DYSPHAGIA: Status: ACTIVE | Noted: 2025-04-30

## 2025-04-30 PROBLEM — Z98.890 PONV (POSTOPERATIVE NAUSEA AND VOMITING): Status: ACTIVE | Noted: 2025-04-30

## 2025-04-30 PROBLEM — R11.2 PONV (POSTOPERATIVE NAUSEA AND VOMITING): Status: ACTIVE | Noted: 2025-04-30

## 2025-04-30 LAB
ABO GROUP (TYPE) IN BLOOD: NORMAL
ANION GAP BLDA CALCULATED.4IONS-SCNC: 10 MMO/L (ref 10–25)
ANTIBODY SCREEN: NORMAL
BASE EXCESS BLDA CALC-SCNC: -2.5 MMOL/L (ref -2–3)
BODY TEMPERATURE: 37 DEGREES CELSIUS
CA-I BLDA-SCNC: 1.32 MMOL/L (ref 1.1–1.33)
CHLORIDE BLDA-SCNC: 106 MMOL/L (ref 98–107)
GLUCOSE BLDA-MCNC: 124 MG/DL (ref 74–99)
HCO3 BLDA-SCNC: 25.1 MMOL/L (ref 22–26)
HCT VFR BLD EST: 33 % (ref 36–46)
HGB BLDA-MCNC: 11.1 G/DL (ref 12–16)
INHALED O2 CONCENTRATION: 40 %
LACTATE BLDA-SCNC: 0.7 MMOL/L (ref 0.4–2)
OXYHGB MFR BLDA: 94.7 % (ref 94–98)
PCO2 BLDA: 56 MM HG (ref 38–42)
PH BLDA: 7.26 PH (ref 7.38–7.42)
PO2 BLDA: 93 MM HG (ref 85–95)
POTASSIUM BLDA-SCNC: 3.7 MMOL/L (ref 3.5–5.3)
RH FACTOR (ANTIGEN D): NORMAL
SAO2 % BLDA: 99 % (ref 94–100)
SODIUM BLDA-SCNC: 137 MMOL/L (ref 136–145)

## 2025-04-30 PROCEDURE — 32662 THORACOSCOPY W/MEDIAST EXC: CPT | Performed by: STUDENT IN AN ORGANIZED HEALTH CARE EDUCATION/TRAINING PROGRAM

## 2025-04-30 PROCEDURE — 2500000004 HC RX 250 GENERAL PHARMACY W/ HCPCS (ALT 636 FOR OP/ED): Performed by: STUDENT IN AN ORGANIZED HEALTH CARE EDUCATION/TRAINING PROGRAM

## 2025-04-30 PROCEDURE — 3700000002 HC GENERAL ANESTHESIA TIME - EACH INCREMENTAL 1 MINUTE: Performed by: STUDENT IN AN ORGANIZED HEALTH CARE EDUCATION/TRAINING PROGRAM

## 2025-04-30 PROCEDURE — 86077 PHYS BLOOD BANK SERV XMATCH: CPT | Performed by: STUDENT IN AN ORGANIZED HEALTH CARE EDUCATION/TRAINING PROGRAM

## 2025-04-30 PROCEDURE — 7100000002 HC RECOVERY ROOM TIME - EACH INCREMENTAL 1 MINUTE: Performed by: STUDENT IN AN ORGANIZED HEALTH CARE EDUCATION/TRAINING PROGRAM

## 2025-04-30 PROCEDURE — 36415 COLL VENOUS BLD VENIPUNCTURE: CPT | Performed by: STUDENT IN AN ORGANIZED HEALTH CARE EDUCATION/TRAINING PROGRAM

## 2025-04-30 PROCEDURE — 3600000018 HC OR TIME - INITIAL BASE CHARGE - PROCEDURE LEVEL SIX: Performed by: STUDENT IN AN ORGANIZED HEALTH CARE EDUCATION/TRAINING PROGRAM

## 2025-04-30 PROCEDURE — 71045 X-RAY EXAM CHEST 1 VIEW: CPT

## 2025-04-30 PROCEDURE — 3700000001 HC GENERAL ANESTHESIA TIME - INITIAL BASE CHARGE: Performed by: STUDENT IN AN ORGANIZED HEALTH CARE EDUCATION/TRAINING PROGRAM

## 2025-04-30 PROCEDURE — 2500000001 HC RX 250 WO HCPCS SELF ADMINISTERED DRUGS (ALT 637 FOR MEDICARE OP): Performed by: PHYSICIAN ASSISTANT

## 2025-04-30 PROCEDURE — 2720000007 HC OR 272 NO HCPCS: Performed by: STUDENT IN AN ORGANIZED HEALTH CARE EDUCATION/TRAINING PROGRAM

## 2025-04-30 PROCEDURE — 71045 X-RAY EXAM CHEST 1 VIEW: CPT | Performed by: RADIOLOGY

## 2025-04-30 PROCEDURE — 88342 IMHCHEM/IMCYTCHM 1ST ANTB: CPT | Performed by: STUDENT IN AN ORGANIZED HEALTH CARE EDUCATION/TRAINING PROGRAM

## 2025-04-30 PROCEDURE — 86870 RBC ANTIBODY IDENTIFICATION: CPT

## 2025-04-30 PROCEDURE — 2500000004 HC RX 250 GENERAL PHARMACY W/ HCPCS (ALT 636 FOR OP/ED)

## 2025-04-30 PROCEDURE — 84132 ASSAY OF SERUM POTASSIUM: CPT

## 2025-04-30 PROCEDURE — 88305 TISSUE EXAM BY PATHOLOGIST: CPT | Performed by: STUDENT IN AN ORGANIZED HEALTH CARE EDUCATION/TRAINING PROGRAM

## 2025-04-30 PROCEDURE — 3600000017 HC OR TIME - EACH INCREMENTAL 1 MINUTE - PROCEDURE LEVEL SIX: Performed by: STUDENT IN AN ORGANIZED HEALTH CARE EDUCATION/TRAINING PROGRAM

## 2025-04-30 PROCEDURE — 8E0W4CZ ROBOTIC ASSISTED PROCEDURE OF TRUNK REGION, PERCUTANEOUS ENDOSCOPIC APPROACH: ICD-10-PCS | Performed by: STUDENT IN AN ORGANIZED HEALTH CARE EDUCATION/TRAINING PROGRAM

## 2025-04-30 PROCEDURE — 88307 TISSUE EXAM BY PATHOLOGIST: CPT | Mod: TC,SUR | Performed by: STUDENT IN AN ORGANIZED HEALTH CARE EDUCATION/TRAINING PROGRAM

## 2025-04-30 PROCEDURE — 7100000001 HC RECOVERY ROOM TIME - INITIAL BASE CHARGE: Performed by: STUDENT IN AN ORGANIZED HEALTH CARE EDUCATION/TRAINING PROGRAM

## 2025-04-30 PROCEDURE — 1200000002 HC GENERAL ROOM WITH TELEMETRY DAILY

## 2025-04-30 PROCEDURE — 2500000005 HC RX 250 GENERAL PHARMACY W/O HCPCS: Mod: JZ | Performed by: STUDENT IN AN ORGANIZED HEALTH CARE EDUCATION/TRAINING PROGRAM

## 2025-04-30 PROCEDURE — 2500000001 HC RX 250 WO HCPCS SELF ADMINISTERED DRUGS (ALT 637 FOR MEDICARE OP)

## 2025-04-30 PROCEDURE — A32662 PR THORACOSCOPY SURG EXC MEDIAST MASS: Performed by: ANESTHESIOLOGY

## 2025-04-30 PROCEDURE — 0BB14ZZ EXCISION OF TRACHEA, PERCUTANEOUS ENDOSCOPIC APPROACH: ICD-10-PCS | Performed by: STUDENT IN AN ORGANIZED HEALTH CARE EDUCATION/TRAINING PROGRAM

## 2025-04-30 PROCEDURE — 07B74ZZ EXCISION OF THORAX LYMPHATIC, PERCUTANEOUS ENDOSCOPIC APPROACH: ICD-10-PCS | Performed by: STUDENT IN AN ORGANIZED HEALTH CARE EDUCATION/TRAINING PROGRAM

## 2025-04-30 PROCEDURE — 7100000024 HC EXTENDED STAY RECOVERY PER MINUTE- PACU: Performed by: STUDENT IN AN ORGANIZED HEALTH CARE EDUCATION/TRAINING PROGRAM

## 2025-04-30 PROCEDURE — 2500000004 HC RX 250 GENERAL PHARMACY W/ HCPCS (ALT 636 FOR OP/ED): Mod: JZ,TB

## 2025-04-30 PROCEDURE — 93010 ELECTROCARDIOGRAM REPORT: CPT | Performed by: INTERNAL MEDICINE

## 2025-04-30 PROCEDURE — 86922 COMPATIBILITY TEST ANTIGLOB: CPT

## 2025-04-30 PROCEDURE — 86901 BLOOD TYPING SEROLOGIC RH(D): CPT | Performed by: STUDENT IN AN ORGANIZED HEALTH CARE EDUCATION/TRAINING PROGRAM

## 2025-04-30 PROCEDURE — 82435 ASSAY OF BLOOD CHLORIDE: CPT

## 2025-04-30 PROCEDURE — 36620 INSERTION CATHETER ARTERY: CPT

## 2025-04-30 PROCEDURE — 32662 THORACOSCOPY W/MEDIAST EXC: CPT | Performed by: PHYSICIAN ASSISTANT

## 2025-04-30 PROCEDURE — 93005 ELECTROCARDIOGRAM TRACING: CPT

## 2025-04-30 PROCEDURE — 2500000004 HC RX 250 GENERAL PHARMACY W/ HCPCS (ALT 636 FOR OP/ED): Performed by: PHYSICIAN ASSISTANT

## 2025-04-30 PROCEDURE — 88307 TISSUE EXAM BY PATHOLOGIST: CPT | Performed by: STUDENT IN AN ORGANIZED HEALTH CARE EDUCATION/TRAINING PROGRAM

## 2025-04-30 PROCEDURE — 07BD4ZZ EXCISION OF AORTIC LYMPHATIC, PERCUTANEOUS ENDOSCOPIC APPROACH: ICD-10-PCS | Performed by: STUDENT IN AN ORGANIZED HEALTH CARE EDUCATION/TRAINING PROGRAM

## 2025-04-30 PROCEDURE — 88360 TUMOR IMMUNOHISTOCHEM/MANUAL: CPT | Performed by: STUDENT IN AN ORGANIZED HEALTH CARE EDUCATION/TRAINING PROGRAM

## 2025-04-30 PROCEDURE — 88341 IMHCHEM/IMCYTCHM EA ADD ANTB: CPT | Performed by: STUDENT IN AN ORGANIZED HEALTH CARE EDUCATION/TRAINING PROGRAM

## 2025-04-30 RX ORDER — HYDROMORPHONE HYDROCHLORIDE 1 MG/ML
INJECTION, SOLUTION INTRAMUSCULAR; INTRAVENOUS; SUBCUTANEOUS AS NEEDED
Status: DISCONTINUED | OUTPATIENT
Start: 2025-04-30 | End: 2025-04-30

## 2025-04-30 RX ORDER — CEFAZOLIN 1 G/1
INJECTION, POWDER, FOR SOLUTION INTRAVENOUS AS NEEDED
Status: DISCONTINUED | OUTPATIENT
Start: 2025-04-30 | End: 2025-04-30

## 2025-04-30 RX ORDER — HYDROMORPHONE HYDROCHLORIDE 2 MG/1
1 TABLET ORAL EVERY 4 HOURS PRN
Status: DISCONTINUED | OUTPATIENT
Start: 2025-04-30 | End: 2025-05-02 | Stop reason: HOSPADM

## 2025-04-30 RX ORDER — MIDAZOLAM HYDROCHLORIDE 1 MG/ML
INJECTION INTRAMUSCULAR; INTRAVENOUS AS NEEDED
Status: DISCONTINUED | OUTPATIENT
Start: 2025-04-30 | End: 2025-04-30

## 2025-04-30 RX ORDER — HEPARIN SODIUM 5000 [USP'U]/ML
5000 INJECTION, SOLUTION INTRAVENOUS; SUBCUTANEOUS EVERY 8 HOURS
Status: DISCONTINUED | OUTPATIENT
Start: 2025-04-30 | End: 2025-05-02 | Stop reason: HOSPADM

## 2025-04-30 RX ORDER — HEPARIN SODIUM 5000 [USP'U]/ML
5000 INJECTION, SOLUTION INTRAVENOUS; SUBCUTANEOUS ONCE
Status: COMPLETED | OUTPATIENT
Start: 2025-04-30 | End: 2025-04-30

## 2025-04-30 RX ORDER — ROCURONIUM BROMIDE 10 MG/ML
INJECTION, SOLUTION INTRAVENOUS AS NEEDED
Status: DISCONTINUED | OUTPATIENT
Start: 2025-04-30 | End: 2025-04-30

## 2025-04-30 RX ORDER — ACETAMINOPHEN 325 MG/1
TABLET ORAL AS NEEDED
Status: DISCONTINUED | OUTPATIENT
Start: 2025-04-30 | End: 2025-04-30

## 2025-04-30 RX ORDER — ASPIRIN 81 MG/1
81 TABLET ORAL DAILY
Status: DISCONTINUED | OUTPATIENT
Start: 2025-04-30 | End: 2025-05-02 | Stop reason: HOSPADM

## 2025-04-30 RX ORDER — LEVOTHYROXINE SODIUM 88 UG/1
88 TABLET ORAL DAILY
Status: DISCONTINUED | OUTPATIENT
Start: 2025-05-01 | End: 2025-05-02 | Stop reason: HOSPADM

## 2025-04-30 RX ORDER — PROPOFOL 10 MG/ML
INJECTION, EMULSION INTRAVENOUS AS NEEDED
Status: DISCONTINUED | OUTPATIENT
Start: 2025-04-30 | End: 2025-04-30

## 2025-04-30 RX ORDER — GABAPENTIN 300 MG/1
CAPSULE ORAL AS NEEDED
Status: DISCONTINUED | OUTPATIENT
Start: 2025-04-30 | End: 2025-04-30

## 2025-04-30 RX ORDER — ACETAMINOPHEN 325 MG/1
975 TABLET ORAL EVERY 6 HOURS PRN
Status: DISCONTINUED | OUTPATIENT
Start: 2025-04-30 | End: 2025-04-30 | Stop reason: HOSPADM

## 2025-04-30 RX ORDER — HYDROMORPHONE HYDROCHLORIDE 2 MG/1
2 TABLET ORAL EVERY 4 HOURS PRN
Status: DISCONTINUED | OUTPATIENT
Start: 2025-04-30 | End: 2025-05-02 | Stop reason: HOSPADM

## 2025-04-30 RX ORDER — CEFAZOLIN SODIUM 2 G/100ML
2 INJECTION, SOLUTION INTRAVENOUS EVERY 8 HOURS
Status: COMPLETED | OUTPATIENT
Start: 2025-04-30 | End: 2025-05-01

## 2025-04-30 RX ORDER — PHENYLEPHRINE 10 MG/250 ML(40 MCG/ML)IN 0.9 % SOD.CHLORIDE INTRAVENOUS
CONTINUOUS PRN
Status: DISCONTINUED | OUTPATIENT
Start: 2025-04-30 | End: 2025-04-30

## 2025-04-30 RX ORDER — CEFAZOLIN SODIUM 2 G/100ML
2 INJECTION, SOLUTION INTRAVENOUS ONCE
Status: DISCONTINUED | OUTPATIENT
Start: 2025-04-30 | End: 2025-04-30 | Stop reason: HOSPADM

## 2025-04-30 RX ORDER — METOPROLOL TARTRATE 1 MG/ML
INJECTION, SOLUTION INTRAVENOUS AS NEEDED
Status: DISCONTINUED | OUTPATIENT
Start: 2025-04-30 | End: 2025-04-30

## 2025-04-30 RX ORDER — GABAPENTIN 100 MG/1
100 CAPSULE ORAL 3 TIMES DAILY
Status: DISCONTINUED | OUTPATIENT
Start: 2025-04-30 | End: 2025-05-01

## 2025-04-30 RX ORDER — SODIUM CHLORIDE, SODIUM LACTATE, POTASSIUM CHLORIDE, CALCIUM CHLORIDE 600; 310; 30; 20 MG/100ML; MG/100ML; MG/100ML; MG/100ML
75 INJECTION, SOLUTION INTRAVENOUS CONTINUOUS
Status: ACTIVE | OUTPATIENT
Start: 2025-04-30 | End: 2025-04-30

## 2025-04-30 RX ORDER — HYDRALAZINE HYDROCHLORIDE 20 MG/ML
5 INJECTION INTRAMUSCULAR; INTRAVENOUS EVERY 30 MIN PRN
Status: DISCONTINUED | OUTPATIENT
Start: 2025-04-30 | End: 2025-04-30 | Stop reason: HOSPADM

## 2025-04-30 RX ORDER — LIDOCAINE HYDROCHLORIDE 20 MG/ML
INJECTION, SOLUTION INFILTRATION; PERINEURAL AS NEEDED
Status: DISCONTINUED | OUTPATIENT
Start: 2025-04-30 | End: 2025-04-30

## 2025-04-30 RX ORDER — BUPIVACAINE HCL/EPINEPHRINE 0.25-.0005
VIAL (ML) INJECTION AS NEEDED
Status: DISCONTINUED | OUTPATIENT
Start: 2025-04-30 | End: 2025-04-30 | Stop reason: HOSPADM

## 2025-04-30 RX ORDER — LABETALOL HYDROCHLORIDE 5 MG/ML
5 INJECTION, SOLUTION INTRAVENOUS ONCE AS NEEDED
Status: DISCONTINUED | OUTPATIENT
Start: 2025-04-30 | End: 2025-04-30 | Stop reason: HOSPADM

## 2025-04-30 RX ORDER — ACETAMINOPHEN 325 MG/1
650 TABLET ORAL EVERY 6 HOURS
Status: DISCONTINUED | OUTPATIENT
Start: 2025-04-30 | End: 2025-05-02 | Stop reason: HOSPADM

## 2025-04-30 RX ORDER — ONDANSETRON HYDROCHLORIDE 2 MG/ML
INJECTION, SOLUTION INTRAVENOUS AS NEEDED
Status: DISCONTINUED | OUTPATIENT
Start: 2025-04-30 | End: 2025-04-30

## 2025-04-30 RX ORDER — PHENYLEPHRINE HCL IN 0.9% NACL 0.4MG/10ML
SYRINGE (ML) INTRAVENOUS AS NEEDED
Status: DISCONTINUED | OUTPATIENT
Start: 2025-04-30 | End: 2025-04-30

## 2025-04-30 RX ORDER — NALOXONE HYDROCHLORIDE 0.4 MG/ML
0.2 INJECTION, SOLUTION INTRAMUSCULAR; INTRAVENOUS; SUBCUTANEOUS EVERY 5 MIN PRN
Status: DISCONTINUED | OUTPATIENT
Start: 2025-04-30 | End: 2025-05-02 | Stop reason: HOSPADM

## 2025-04-30 RX ORDER — DROPERIDOL 2.5 MG/ML
0.62 INJECTION, SOLUTION INTRAMUSCULAR; INTRAVENOUS ONCE AS NEEDED
Status: DISCONTINUED | OUTPATIENT
Start: 2025-04-30 | End: 2025-04-30 | Stop reason: HOSPADM

## 2025-04-30 RX ORDER — HYDROMORPHONE HYDROCHLORIDE 0.2 MG/ML
0.2 INJECTION INTRAMUSCULAR; INTRAVENOUS; SUBCUTANEOUS EVERY 5 MIN PRN
Status: DISCONTINUED | OUTPATIENT
Start: 2025-04-30 | End: 2025-04-30 | Stop reason: HOSPADM

## 2025-04-30 RX ORDER — PROCHLORPERAZINE EDISYLATE 5 MG/ML
5 INJECTION INTRAMUSCULAR; INTRAVENOUS ONCE AS NEEDED
Status: DISCONTINUED | OUTPATIENT
Start: 2025-04-30 | End: 2025-04-30 | Stop reason: HOSPADM

## 2025-04-30 RX ORDER — IPRATROPIUM BROMIDE AND ALBUTEROL SULFATE 2.5; .5 MG/3ML; MG/3ML
3 SOLUTION RESPIRATORY (INHALATION) EVERY 6 HOURS PRN
Status: DISCONTINUED | OUTPATIENT
Start: 2025-04-30 | End: 2025-05-02 | Stop reason: HOSPADM

## 2025-04-30 RX ORDER — OXYCODONE HYDROCHLORIDE 5 MG/1
10 TABLET ORAL EVERY 4 HOURS PRN
Status: DISCONTINUED | OUTPATIENT
Start: 2025-04-30 | End: 2025-04-30 | Stop reason: HOSPADM

## 2025-04-30 RX ORDER — SODIUM CHLORIDE 0.9 G/100ML
INJECTION, SOLUTION IRRIGATION AS NEEDED
Status: DISCONTINUED | OUTPATIENT
Start: 2025-04-30 | End: 2025-04-30 | Stop reason: HOSPADM

## 2025-04-30 RX ORDER — FENTANYL CITRATE 50 UG/ML
INJECTION, SOLUTION INTRAMUSCULAR; INTRAVENOUS AS NEEDED
Status: DISCONTINUED | OUTPATIENT
Start: 2025-04-30 | End: 2025-04-30

## 2025-04-30 RX ORDER — AMOXICILLIN 250 MG
2 CAPSULE ORAL 2 TIMES DAILY
Status: DISCONTINUED | OUTPATIENT
Start: 2025-04-30 | End: 2025-05-02 | Stop reason: HOSPADM

## 2025-04-30 RX ORDER — ONDANSETRON HYDROCHLORIDE 2 MG/ML
4 INJECTION, SOLUTION INTRAVENOUS EVERY 8 HOURS PRN
Status: DISCONTINUED | OUTPATIENT
Start: 2025-04-30 | End: 2025-05-02 | Stop reason: HOSPADM

## 2025-04-30 RX ORDER — MAGNESIUM SULFATE HEPTAHYDRATE 500 MG/ML
INJECTION, SOLUTION INTRAMUSCULAR; INTRAVENOUS AS NEEDED
Status: DISCONTINUED | OUTPATIENT
Start: 2025-04-30 | End: 2025-04-30

## 2025-04-30 RX ORDER — LIDOCAINE 560 MG/1
1 PATCH PERCUTANEOUS; TOPICAL; TRANSDERMAL DAILY
Status: DISCONTINUED | OUTPATIENT
Start: 2025-04-30 | End: 2025-05-02 | Stop reason: HOSPADM

## 2025-04-30 RX ORDER — ALBUTEROL SULFATE 0.83 MG/ML
2.5 SOLUTION RESPIRATORY (INHALATION) ONCE AS NEEDED
Status: DISCONTINUED | OUTPATIENT
Start: 2025-04-30 | End: 2025-04-30 | Stop reason: HOSPADM

## 2025-04-30 RX ORDER — ONDANSETRON HYDROCHLORIDE 2 MG/ML
4 INJECTION, SOLUTION INTRAVENOUS ONCE AS NEEDED
Status: DISCONTINUED | OUTPATIENT
Start: 2025-04-30 | End: 2025-04-30 | Stop reason: HOSPADM

## 2025-04-30 RX ORDER — OXYCODONE HYDROCHLORIDE 5 MG/1
5 TABLET ORAL EVERY 4 HOURS PRN
Status: DISCONTINUED | OUTPATIENT
Start: 2025-04-30 | End: 2025-04-30 | Stop reason: HOSPADM

## 2025-04-30 RX ADMIN — ASPIRIN 81 MG: 81 TABLET, COATED ORAL at 14:36

## 2025-04-30 RX ADMIN — METOPROLOL TARTRATE 3 MG: 1 INJECTION, SOLUTION INTRAVENOUS at 10:39

## 2025-04-30 RX ADMIN — SENNOSIDES AND DOCUSATE SODIUM 2 TABLET: 50; 8.6 TABLET ORAL at 21:40

## 2025-04-30 RX ADMIN — HYDROMORPHONE HYDROCHLORIDE 0.2 MG: 1 INJECTION, SOLUTION INTRAMUSCULAR; INTRAVENOUS; SUBCUTANEOUS at 10:14

## 2025-04-30 RX ADMIN — Medication 80 MCG: at 09:45

## 2025-04-30 RX ADMIN — Medication 80 MCG: at 09:52

## 2025-04-30 RX ADMIN — HYDROMORPHONE HYDROCHLORIDE 0.2 MG: 0.2 INJECTION INTRAMUSCULAR; INTRAVENOUS; SUBCUTANEOUS at 12:02

## 2025-04-30 RX ADMIN — Medication 80 MCG: at 09:43

## 2025-04-30 RX ADMIN — PROPOFOL 100 MG: 10 INJECTION, EMULSION INTRAVENOUS at 08:48

## 2025-04-30 RX ADMIN — ACETAMINOPHEN 650 MG: 325 TABLET, FILM COATED ORAL at 14:36

## 2025-04-30 RX ADMIN — ONDANSETRON 4 MG: 2 INJECTION INTRAMUSCULAR; INTRAVENOUS at 10:33

## 2025-04-30 RX ADMIN — Medication 80 MCG: at 09:27

## 2025-04-30 RX ADMIN — ACETAMINOPHEN 650 MG: 325 TABLET, FILM COATED ORAL at 08:09

## 2025-04-30 RX ADMIN — PHENYLEPHRINE-NACL IV SOLUTION 10 MG/250ML-0.9% 0.2 MCG/KG/MIN: 10-0.9/25 SOLUTION at 09:29

## 2025-04-30 RX ADMIN — PROPOFOL 20 MG: 10 INJECTION, EMULSION INTRAVENOUS at 08:56

## 2025-04-30 RX ADMIN — HYDROMORPHONE HYDROCHLORIDE 0.5 MG: 1 INJECTION, SOLUTION INTRAMUSCULAR; INTRAVENOUS; SUBCUTANEOUS at 11:04

## 2025-04-30 RX ADMIN — HYDROMORPHONE HYDROCHLORIDE 2 MG: 2 TABLET ORAL at 19:25

## 2025-04-30 RX ADMIN — HYDROMORPHONE HYDROCHLORIDE 0.5 MG: 1 INJECTION, SOLUTION INTRAMUSCULAR; INTRAVENOUS; SUBCUTANEOUS at 10:51

## 2025-04-30 RX ADMIN — GABAPENTIN 100 MG: 100 CAPSULE ORAL at 21:40

## 2025-04-30 RX ADMIN — DEXAMETHASONE SODIUM PHOSPHATE 8 MG: 4 INJECTION, SOLUTION INTRA-ARTICULAR; INTRALESIONAL; INTRAMUSCULAR; INTRAVENOUS; SOFT TISSUE at 09:05

## 2025-04-30 RX ADMIN — GABAPENTIN 300 MG: 300 CAPSULE ORAL at 08:09

## 2025-04-30 RX ADMIN — ROCURONIUM 60 MG: 50 INJECTION, SOLUTION INTRAVENOUS at 08:48

## 2025-04-30 RX ADMIN — FENTANYL CITRATE 50 MCG: 50 INJECTION, SOLUTION INTRAMUSCULAR; INTRAVENOUS at 08:47

## 2025-04-30 RX ADMIN — CEFAZOLIN 2 G: 1 INJECTION, POWDER, FOR SOLUTION INTRAMUSCULAR; INTRAVENOUS at 09:02

## 2025-04-30 RX ADMIN — MIDAZOLAM HYDROCHLORIDE 2 MG: 2 INJECTION, SOLUTION INTRAMUSCULAR; INTRAVENOUS at 08:38

## 2025-04-30 RX ADMIN — CEFAZOLIN SODIUM 2 G: 2 INJECTION, SOLUTION INTRAVENOUS at 17:44

## 2025-04-30 RX ADMIN — Medication 120 MCG: at 09:54

## 2025-04-30 RX ADMIN — ACETAMINOPHEN 650 MG: 325 TABLET, FILM COATED ORAL at 21:40

## 2025-04-30 RX ADMIN — HEPARIN SODIUM 5000 UNITS: 5000 INJECTION, SOLUTION INTRAVENOUS; SUBCUTANEOUS at 21:40

## 2025-04-30 RX ADMIN — SODIUM CHLORIDE, SODIUM LACTATE, POTASSIUM CHLORIDE, AND CALCIUM CHLORIDE: 600; 310; 30; 20 INJECTION, SOLUTION INTRAVENOUS at 08:40

## 2025-04-30 RX ADMIN — HEPARIN SODIUM 5000 UNITS: 5000 INJECTION, SOLUTION INTRAVENOUS; SUBCUTANEOUS at 08:16

## 2025-04-30 RX ADMIN — OXYCODONE 5 MG: 5 TABLET ORAL at 12:29

## 2025-04-30 RX ADMIN — HYDROMORPHONE HYDROCHLORIDE 2 MG: 2 TABLET ORAL at 14:36

## 2025-04-30 RX ADMIN — MAGNESIUM SULFATE HEPTAHYDRATE 2 G: 500 INJECTION, SOLUTION INTRAMUSCULAR; INTRAVENOUS at 09:20

## 2025-04-30 RX ADMIN — HYDROMORPHONE HYDROCHLORIDE 0.3 MG: 1 INJECTION, SOLUTION INTRAMUSCULAR; INTRAVENOUS; SUBCUTANEOUS at 10:11

## 2025-04-30 RX ADMIN — SUGAMMADEX 200 MG: 100 INJECTION, SOLUTION INTRAVENOUS at 10:42

## 2025-04-30 RX ADMIN — FENTANYL CITRATE 50 MCG: 50 INJECTION, SOLUTION INTRAMUSCULAR; INTRAVENOUS at 09:17

## 2025-04-30 RX ADMIN — Medication 100 MCG: at 09:31

## 2025-04-30 RX ADMIN — HYDROMORPHONE HYDROCHLORIDE 0.5 MG: 1 INJECTION, SOLUTION INTRAMUSCULAR; INTRAVENOUS; SUBCUTANEOUS at 11:10

## 2025-04-30 RX ADMIN — LIDOCAINE HYDROCHLORIDE 60 MG: 20 INJECTION, SOLUTION INFILTRATION; PERINEURAL at 08:47

## 2025-04-30 RX ADMIN — Medication 80 MCG: at 09:07

## 2025-04-30 RX ADMIN — GABAPENTIN 100 MG: 100 CAPSULE ORAL at 14:36

## 2025-04-30 RX ADMIN — PROPOFOL 30 MG: 10 INJECTION, EMULSION INTRAVENOUS at 09:05

## 2025-04-30 ASSESSMENT — PAIN - FUNCTIONAL ASSESSMENT
PAIN_FUNCTIONAL_ASSESSMENT: UNABLE TO SELF-REPORT
PAIN_FUNCTIONAL_ASSESSMENT: UNABLE TO SELF-REPORT
PAIN_FUNCTIONAL_ASSESSMENT: 0-10
PAIN_FUNCTIONAL_ASSESSMENT: UNABLE TO SELF-REPORT
PAIN_FUNCTIONAL_ASSESSMENT: 0-10
PAIN_FUNCTIONAL_ASSESSMENT: 0-10
PAIN_FUNCTIONAL_ASSESSMENT: UNABLE TO SELF-REPORT
PAIN_FUNCTIONAL_ASSESSMENT: 0-10
PAIN_FUNCTIONAL_ASSESSMENT: 0-10

## 2025-04-30 ASSESSMENT — ACTIVITIES OF DAILY LIVING (ADL)
GROOMING: INDEPENDENT
DRESSING YOURSELF: INDEPENDENT
HEARING - RIGHT EAR: FUNCTIONAL
BATHING: INDEPENDENT
HEARING - LEFT EAR: FUNCTIONAL
FEEDING YOURSELF: INDEPENDENT
TOILETING: INDEPENDENT
WALKS IN HOME: INDEPENDENT
ASSISTIVE_DEVICE: DENTURES LOWER;DENTURES UPPER
PATIENT'S MEMORY ADEQUATE TO SAFELY COMPLETE DAILY ACTIVITIES?: YES
JUDGMENT_ADEQUATE_SAFELY_COMPLETE_DAILY_ACTIVITIES: YES

## 2025-04-30 ASSESSMENT — PAIN SCALES - GENERAL
PAINLEVEL_OUTOF10: 3
PAINLEVEL_OUTOF10: 8
PAINLEVEL_OUTOF10: 0 - NO PAIN
PAINLEVEL_OUTOF10: 8
PAINLEVEL_OUTOF10: 8
PAINLEVEL_OUTOF10: 6
PAINLEVEL_OUTOF10: 4
PAINLEVEL_OUTOF10: 6
PAINLEVEL_OUTOF10: 8
PAINLEVEL_OUTOF10: 10 - WORST POSSIBLE PAIN

## 2025-04-30 ASSESSMENT — PAIN DESCRIPTION - ORIENTATION
ORIENTATION: RIGHT

## 2025-04-30 ASSESSMENT — PAIN DESCRIPTION - LOCATION
LOCATION: CHEST

## 2025-04-30 NOTE — ANESTHESIA PROCEDURE NOTES
Airway  Date/Time: 4/30/2025 8:50 AM  Reason: elective    Airway not difficult    Staffing  Performed: resident   Authorized by: Michael Barraza MD    Performed by: Kenya Slaughter DO  Patient location during procedure: OR    Patient Condition  Indications for airway management: anesthesia  Patient position: sniffing  Planned trial extubation  Sedation level: deep     Final Airway Details   Preoxygenated: yes  Final airway type: endotracheal airway  Successful airway: ETT - double lumen right  Cuffed: yes   Successful intubation technique: video laryngoscopy  Adjuncts used in placement: intubating stylet  Endotracheal tube insertion site: oral  Blade: Sterling  Blade size: #3  ETT DL size (fr): 35  Cormack-Lehane Classification: grade I - full view of glottis  Placement verified by: bronchoscopy, capnometry and single lung ventilation   Number of attempts at approach: 1    Additional Comments  Easy mask no oral airway used  Grade 1 view with  Briscoe video MAC 3 blade  Size 35 Fr. Right DEAN placed and confirmed with bronchoscope

## 2025-04-30 NOTE — BRIEF OP NOTE
Date: 2025  OR Location: [facility] OR    Name: Jaja Bradshaw, : 1955, Age: 69 y.o., MRN: [id number], Sex: female    Diagnosis  Pre-op Diagnosis      * Mediastinal mass [J98.59] Post-op Diagnosis     * Mediastinal mass [J98.59]     Procedures  Robotic right paratracheal mass resection, lymph node dissection, and intercostal nerve blocks    Surgeons      * Vickie Gonzalez - Primary    Resident/Fellow/Other Assistant:  Surgeons and Role:     * Arianna David MD - Resident - Assisting     * MARTI ValenciaC - RITA First Assist    Staff:   Circulator: Beth  Circulator: Olya Harrell Person: Kenya    Anesthesia Staff: Anesthesiologist: Michael Barraza MD  Anesthesia Resident: Kenya Slaughter DO    Procedure Summary  Anesthesia: General  ASA: III  Estimated Blood Loss: 20mL  Intra-op Medications:   Administrations occurring from 0745 to 1025 on 25:   Medication Name Total Dose   sodium chloride 0.9 % irrigation solution 1,000 mL   BUPivacaine-EPINEPHrine (Marcaine w/EPI) 0.25 %-1:200,000 injection 30 mL   heparin (porcine) injection 5,000 Units 5,000 Units   acetaminophen (Tylenol) tablet 650 mg   ceFAZolin (Ancef) vial 1 g 2 g   dexAMETHasone (Decadron) injection 4 mg/mL 8 mg   fentaNYL (Sublimaze) injection 50 mcg/mL 100 mcg   gabapentin (Neurontin) capsule 300 mg   HYDROmorphone (Dilaudid) injection 1 mg/mL 0.5 mg   LR bolus Cannot be calculated   lidocaine (Xylocaine) injection 2 % 60 mg   magnesium sulfate 50 % injection 2 g   midazolam PF (Versed) injection 1 mg/mL 2 mg   phenylephrine (Michael-Synephrine) 10 mg in sodium chloride 0.9% 250 mL (0.04 mg/mL) infusion (premix) 0.96 mg   phenylephrine 40 mcg/mL syringe 10 mL 620 mcg   propofol (Diprivan) injection 10 mg/mL 150 mg   rocuronium (ZeMuron) 50 mg/5 mL injection 60 mg          Anesthesia Record               Intraprocedure I/O Totals          Intake    LR bolus 500.00 mL    Phenylephrine Drip 25.35 mL    The total shown is  the total volume documented since Anesthesia Start was filed.    Total Intake 525.35 mL       Output    Est. Blood Loss 10 mL    Total Output 10 mL       Net    Net Volume 515.35 mL          Specimen:   ID Type Source Tests Collected by Time   1 : SUPERIOR VENA CAVA LYMPH NODE Tissue LYMPH NODE PULMONARY (SPECIFY SITE) SURGICAL PATHOLOGY EXAM Vickie Gonzalez,  4/30/2025 0917   2 : LEVEL 10 LYMPH NODE Tissue LYMPH NODE PULMONARY (SPECIFY SITE) SURGICAL PATHOLOGY EXAM Vickie Gonzalez, DO 4/30/2025 1010   3 : ELANA-AORTIC LYMPH NODE Tissue LYMPH NODE PULMONARY (SPECIFY SITE) SURGICAL PATHOLOGY EXAM Vickie Gonzalez, DO 4/30/2025 1012   4 : PARATRACHEAL MASS Tissue TRACHEA BIOPSY SURGICAL PATHOLOGY EXAM Vickie Gonzalez,  4/30/2025 1013      Findings: See Dr. Gonzalez's operative note for additional details.  Complications:  None; patient tolerated the procedure well.     Disposition: PACU - hemodynamically stable.  Condition: stable    I was the bedside assistant in Dr. Gonzalez's robotic right paratracheal mass resection, lymph node dissection, and intercostal nerve blocks.     Sana Carl PA-C

## 2025-04-30 NOTE — OP NOTE
PARATRACHEAL MASS EXISION, ROBOT-ASSISTED (R) Operative Note     Date: 2025  OR Location: [facility] OR    Name: Jaja Bradshaw, : 1955, Age: 69 y.o., MRN: [id number], Sex: female    Diagnosis  Pre-op Diagnosis      * Mediastinal mass [J98.59] Post-op Diagnosis     * Mediastinal mass [J98.59]     Procedures  PARATRACHEAL MASS EXISION, ROBOT-ASSISTED  21384 - NC THORACOSCOPY W/EXC MEDIASTINAL CYST TUMOR/MASS      Surgeons      * Vickie Gonzalez - Primary    Resident/Fellow/Other Assistant:  Surgeons and Role:     * Arianna David MD - Resident - Assisting     * Sana Carl PA-C - RITA First Assist    Staff:   Brooksulator: Beth  Circulator: Olya Harrell Person: Kenya    Anesthesia Staff: Anesthesiologist: Michael Barraza MD  Anesthesia Resident: Kenya Slaughter DO    Procedure Summary  Anesthesia: General  ASA: III  Estimated Blood Loss: 100mL  Intra-op Medications:   Administrations occurring from 0745 to 1025 on 25:   Medication Name Total Dose   sodium chloride 0.9 % irrigation solution 1,000 mL   BUPivacaine-EPINEPHrine (Marcaine w/EPI) 0.25 %-1:200,000 injection 30 mL   heparin (porcine) injection 5,000 Units 5,000 Units   acetaminophen (Tylenol) tablet 650 mg   ceFAZolin (Ancef) vial 1 g 2 g   dexAMETHasone (Decadron) injection 4 mg/mL 8 mg   fentaNYL (Sublimaze) injection 50 mcg/mL 100 mcg   gabapentin (Neurontin) capsule 300 mg   HYDROmorphone (Dilaudid) injection 1 mg/mL 0.5 mg   LR bolus Cannot be calculated   lidocaine (Xylocaine) injection 2 % 60 mg   magnesium sulfate 50 % injection 2 g   midazolam PF (Versed) injection 1 mg/mL 2 mg   phenylephrine (Michael-Synephrine) 10 mg in sodium chloride 0.9% 250 mL (0.04 mg/mL) infusion (premix) 0.96 mg   phenylephrine 40 mcg/mL syringe 10 mL 620 mcg   propofol (Diprivan) injection 10 mg/mL 150 mg   rocuronium (ZeMuron) 50 mg/5 mL injection 60 mg              Anesthesia Record               Intraprocedure I/O Totals           Intake    LR bolus 500.00 mL    Phenylephrine Drip 25.35 mL    The total shown is the total volume documented since Anesthesia Start was filed.    Total Intake 525.35 mL       Output    Est. Blood Loss 10 mL    Total Output 10 mL       Net    Net Volume 515.35 mL          Specimen:   ID Type Source Tests Collected by Time   1 : SUPERIOR VENA CAVA LYMPH NODE Tissue LYMPH NODE PULMONARY (SPECIFY SITE) SURGICAL PATHOLOGY EXAM Vickie CLEMENTE Raymundoquinn, DO 4/30/2025 0917   2 : LEVEL 10 LYMPH NODE Tissue LYMPH NODE PULMONARY (SPECIFY SITE) SURGICAL PATHOLOGY EXAM Vickie Duncani, DO 4/30/2025 1010   3 : ELANA-AORTIC LYMPH NODE Tissue LYMPH NODE PULMONARY (SPECIFY SITE) SURGICAL PATHOLOGY EXAM Vickie CLEMENTE Raymundoquinni, DO 4/30/2025 1012   4 : PARATRACHEAL MASS Tissue TRACHEA RESECTION SURGICAL PATHOLOGY EXAM Vickie CLEMENTE Duncan, DO 4/30/2025 1013                 Drains and/or Catheters:   Chest Tube 1 Right Pleural 24 Fr (Active)   Function -20 cm H2O 04/30/25 1426   Drainage Description Serous 04/30/25 1115   Dressing Status Clean;Dry 04/30/25 1115   Site Assessment Clean;Dry;Intact;No hematoma 04/30/25 1115       Tourniquet Times:         Implants:     Findings: Solid right paratracheal mass, firm white/gray    Indications: Jaja Bradshaw is an 69 y.o. female who is having surgery for Mediastinal mass [J98.59]. This was seen on prior CT scan, she underwent a cervical mediastinoscopy at which time we felt we got a biopsy of this mass, results of this and lymph node showed only granulomatous disease. However, this lesion continued to grow and patient has diffuse pain/myasthenias, weakness and remains concerning. We discussed surgical resection and patient was agreeable.    The patient was seen in the preoperative area. The risks, benefits, complications, treatment options, non-operative alternatives, expected recovery and outcomes were discussed with the patient. The possibilities of reaction to medication, pulmonary aspiration,  injury to surrounding structures, bleeding, recurrent infection, the need for additional procedures, failure to diagnose a condition, and creating a complication requiring transfusion or operation were discussed with the patient. The patient concurred with the proposed plan, giving informed consent.  The site of surgery was properly noted/marked if necessary per policy. The patient has been actively warmed in preoperative area. Preoperative antibiotics have been ordered and given within 1 hours of incision. Venous thrombosis prophylaxis have been ordered including bilateral sequential compression devices and chemical prophylaxis    Procedure Details: Patient was brought into the operating suite and procedural information was confirmed. General anesthesia was induced and a double lumen endotracheal tube was placed. She was transitioned to the left lateral decubitus position and appropriately padded. Her right chest was prepped and draped in the typical sterile fashion. We placed an 8mm robotic port in the 7th intercostal space along the posterior axillary line and three additional robotic ports were placed along this rib space. We also placed an assist port, 12mm, between arms 3 and 4. Robot was docked and instruments were inserted. The lung was retracted inferiorly and the mediastinal pleura above the azygous and overlying the mass was opened. We began to circumferentially dissect the mass away from the superior vena cava anteriorly, the aortic adventitia medially, and the trachea posteriorly. We were quite careful/dissection was tedious given the location and major surrounding structures in this area/it was more difficult than 80% of similar resections. The lesion extended several cm below the azygous vein so we ultimately circumferentially dissected this and stapled it allowing improved access to the inferior lesion. There were many small draining and feeding vessels which were carefully cauterized. There was a  nearby level 10 lymph node removed from along the airway as well as a periaortic node and a superior vena caval node.  The lesion was solid, small area of the capsule was broken at one point during retraction, no evidence of fluid within the lesion. Once the lesion was entirely dissected we placed it into an endocatch bag and removed it, sent for permanent pathology. The space was irrigated and hemostasis was confirmed. A piece of Nu-knit was placed. The robot was de-docked, ports removed after intercostal nerve blocks were placed and a 24 Fr apical chest tube was placed. The lung re-expanded well under visualization. Our tube was secured in place and incisions were closed in layers. Patient was placed supine, extubated, and taken to PACU in stable condition.     Evidence of Infection: No   Complications:  None; patient tolerated the procedure well.    Disposition: PACU - hemodynamically stable.  Condition: stable             Task Performed by RITA First Assist or Physician Assistant:   Sana, our PA/NP, was necessary to assist on this case due to the nature of the case and difficulty. During the case she served as my assist by for port placement, camera assistance, robotic bedside assist, and wound closure.   No qualified resident/fellow available.       Additional Details: n/a    Attending Attestation: I was present and scrubbed for the key portions of the procedure.    Vickie Gonzalez  Phone Number: [phone]

## 2025-04-30 NOTE — ANESTHESIA PROCEDURE NOTES
Peripheral IV  Date/Time: 4/30/2025 8:52 AM      Placement  Needle size: 16 G  Laterality: left  Location: forearm  Local anesthetic: none  Site prep: chlorhexidine  Technique: anatomical landmarks  Attempts: 1

## 2025-04-30 NOTE — ANESTHESIA POSTPROCEDURE EVALUATION
Patient: Jaja Bradshaw    Procedure Summary       Date: 04/30/25 Room / Location: [facility] OR 14 / [facility] OR    Anesthesia Start: 0819 Anesthesia Stop: 1054    Procedure: PARATRACHEAL MASS EXISION, ROBOT-ASSISTED (Right: Chest) Diagnosis:       Mediastinal mass      (Mediastinal mass [J98.59])    Surgeons: Vickie Gonzalez DO Responsible Provider: Michael Barraza MD    Anesthesia Type: general ASA Status: 3            Anesthesia Type: general    Vitals Value Taken Time   /82 04/30/25 10:47   Temp 36.0 04/30/25 10:54   Pulse 83 04/30/25 10:50   Resp 13 04/30/25 10:50   SpO2 100 % 04/30/25 10:50   Vitals shown include unfiled device data.    Anesthesia Post Evaluation    Patient location during evaluation: PACU  Patient participation: complete - patient participated  Level of consciousness: awake and alert  Pain management: adequate  Multimodal analgesia pain management approach  Airway patency: patent  Two or more strategies used to mitigate risk of obstructive sleep apnea  Cardiovascular status: acceptable  Respiratory status: acceptable and face mask  Hydration status: acceptable  Postoperative Nausea and Vomiting: none        There were no known notable events for this encounter.

## 2025-04-30 NOTE — ANESTHESIA PROCEDURE NOTES
Arterial Line:    Date/Time: 4/30/2025 8:54 AM    Staffing  Performed: resident   Authorized by: Michael Barraza MD    Performed by: Kenya Slaughter DO    An arterial line was placed. Procedure performed using surface landmarks.in the OR for the following indication(s): continuous blood pressure monitoring and blood sampling needed.    A 20 gauge (size), 1 and 3/4 inch (length), Angiocath (type) catheter was placed into the Left radial artery, secured by Tegaderm   Seldinger technique used.  Events:  patient tolerated procedure well with no complications.

## 2025-05-01 ENCOUNTER — APPOINTMENT (OUTPATIENT)
Dept: RADIOLOGY | Facility: HOSPITAL | Age: 70
End: 2025-05-01
Payer: MEDICARE

## 2025-05-01 LAB
ALBUMIN SERPL BCP-MCNC: 3.8 G/DL (ref 3.4–5)
ANION GAP SERPL CALC-SCNC: 16 MMOL/L (ref 10–20)
ATRIAL RATE: 95 BPM
BB ANTIBODY IDENTIFICATION: NORMAL
BUN SERPL-MCNC: 8 MG/DL (ref 6–23)
CALCIUM SERPL-MCNC: 9.8 MG/DL (ref 8.6–10.6)
CASE #: NORMAL
CHLORIDE SERPL-SCNC: 99 MMOL/L (ref 98–107)
CO2 SERPL-SCNC: 27 MMOL/L (ref 21–32)
CREAT SERPL-MCNC: 0.57 MG/DL (ref 0.5–1.05)
EGFRCR SERPLBLD CKD-EPI 2021: >90 ML/MIN/1.73M*2
ERYTHROCYTE [DISTWIDTH] IN BLOOD BY AUTOMATED COUNT: 12.9 % (ref 11.5–14.5)
GLUCOSE SERPL-MCNC: 81 MG/DL (ref 74–99)
HCT VFR BLD AUTO: 30.2 % (ref 36–46)
HGB BLD-MCNC: 10 G/DL (ref 12–16)
MAGNESIUM SERPL-MCNC: 1.86 MG/DL (ref 1.6–2.4)
MCH RBC QN AUTO: 33.3 PG (ref 26–34)
MCHC RBC AUTO-ENTMCNC: 33.1 G/DL (ref 32–36)
MCV RBC AUTO: 101 FL (ref 80–100)
NRBC BLD-RTO: 0 /100 WBCS (ref 0–0)
P AXIS: 81 DEGREES
P OFFSET: 218 MS
P ONSET: 165 MS
PLATELET # BLD AUTO: 178 X10*3/UL (ref 150–450)
POTASSIUM SERPL-SCNC: 3.7 MMOL/L (ref 3.5–5.3)
PR INTERVAL: 128 MS
PREALB SERPL-MCNC: 20.7 MG/DL (ref 18–40)
Q ONSET: 229 MS
QRS COUNT: 16 BEATS
QRS DURATION: 62 MS
QT INTERVAL: 356 MS
QTC CALCULATION(BAZETT): 447 MS
QTC FREDERICIA: 414 MS
R AXIS: -61 DEGREES
RBC # BLD AUTO: 3 X10*6/UL (ref 4–5.2)
SODIUM SERPL-SCNC: 138 MMOL/L (ref 136–145)
T AXIS: 84 DEGREES
T OFFSET: 407 MS
TRANSFERRIN SERPL-MCNC: 246 MG/DL (ref 200–360)
VENTRICULAR RATE: 95 BPM
WBC # BLD AUTO: 14 X10*3/UL (ref 4.4–11.3)

## 2025-05-01 PROCEDURE — 2500000001 HC RX 250 WO HCPCS SELF ADMINISTERED DRUGS (ALT 637 FOR MEDICARE OP): Performed by: NURSE PRACTITIONER

## 2025-05-01 PROCEDURE — 2500000001 HC RX 250 WO HCPCS SELF ADMINISTERED DRUGS (ALT 637 FOR MEDICARE OP): Performed by: PHYSICIAN ASSISTANT

## 2025-05-01 PROCEDURE — 2500000002 HC RX 250 W HCPCS SELF ADMINISTERED DRUGS (ALT 637 FOR MEDICARE OP, ALT 636 FOR OP/ED): Performed by: NURSE PRACTITIONER

## 2025-05-01 PROCEDURE — 84134 ASSAY OF PREALBUMIN: CPT | Performed by: PHYSICIAN ASSISTANT

## 2025-05-01 PROCEDURE — 2500000004 HC RX 250 GENERAL PHARMACY W/ HCPCS (ALT 636 FOR OP/ED): Performed by: NURSE PRACTITIONER

## 2025-05-01 PROCEDURE — 71045 X-RAY EXAM CHEST 1 VIEW: CPT | Performed by: RADIOLOGY

## 2025-05-01 PROCEDURE — 84466 ASSAY OF TRANSFERRIN: CPT | Performed by: PHYSICIAN ASSISTANT

## 2025-05-01 PROCEDURE — 71045 X-RAY EXAM CHEST 1 VIEW: CPT

## 2025-05-01 PROCEDURE — 97162 PT EVAL MOD COMPLEX 30 MIN: CPT | Mod: GP | Performed by: PHYSICAL THERAPIST

## 2025-05-01 PROCEDURE — 99232 SBSQ HOSP IP/OBS MODERATE 35: CPT | Performed by: NURSE PRACTITIONER

## 2025-05-01 PROCEDURE — 36415 COLL VENOUS BLD VENIPUNCTURE: CPT | Performed by: PHYSICIAN ASSISTANT

## 2025-05-01 PROCEDURE — 83735 ASSAY OF MAGNESIUM: CPT | Performed by: PHYSICIAN ASSISTANT

## 2025-05-01 PROCEDURE — 1200000002 HC GENERAL ROOM WITH TELEMETRY DAILY

## 2025-05-01 PROCEDURE — 80048 BASIC METABOLIC PNL TOTAL CA: CPT | Performed by: PHYSICIAN ASSISTANT

## 2025-05-01 PROCEDURE — 2500000002 HC RX 250 W HCPCS SELF ADMINISTERED DRUGS (ALT 637 FOR MEDICARE OP, ALT 636 FOR OP/ED): Performed by: PHYSICIAN ASSISTANT

## 2025-05-01 PROCEDURE — 2500000004 HC RX 250 GENERAL PHARMACY W/ HCPCS (ALT 636 FOR OP/ED): Performed by: PHYSICIAN ASSISTANT

## 2025-05-01 PROCEDURE — S4991 NICOTINE PATCH NONLEGEND: HCPCS | Performed by: NURSE PRACTITIONER

## 2025-05-01 PROCEDURE — 82040 ASSAY OF SERUM ALBUMIN: CPT | Performed by: PHYSICIAN ASSISTANT

## 2025-05-01 PROCEDURE — 85027 COMPLETE CBC AUTOMATED: CPT | Performed by: PHYSICIAN ASSISTANT

## 2025-05-01 RX ORDER — IBUPROFEN 600 MG/1
600 TABLET ORAL EVERY 6 HOURS SCHEDULED
Status: DISCONTINUED | OUTPATIENT
Start: 2025-05-01 | End: 2025-05-02 | Stop reason: HOSPADM

## 2025-05-01 RX ORDER — POTASSIUM CHLORIDE 1.5 G/1.58G
20 POWDER, FOR SOLUTION ORAL ONCE
Status: DISCONTINUED | OUTPATIENT
Start: 2025-05-01 | End: 2025-05-01

## 2025-05-01 RX ORDER — POTASSIUM CHLORIDE 20 MEQ/1
20 TABLET, EXTENDED RELEASE ORAL ONCE
Status: COMPLETED | OUTPATIENT
Start: 2025-05-01 | End: 2025-05-01

## 2025-05-01 RX ORDER — LANOLIN ALCOHOL/MO/W.PET/CERES
400 CREAM (GRAM) TOPICAL ONCE
Status: COMPLETED | OUTPATIENT
Start: 2025-05-01 | End: 2025-05-01

## 2025-05-01 RX ORDER — GUAIFENESIN 600 MG/1
600 TABLET, EXTENDED RELEASE ORAL 2 TIMES DAILY
Status: DISCONTINUED | OUTPATIENT
Start: 2025-05-01 | End: 2025-05-02 | Stop reason: HOSPADM

## 2025-05-01 RX ORDER — KETOROLAC TROMETHAMINE 15 MG/ML
15 INJECTION, SOLUTION INTRAMUSCULAR; INTRAVENOUS ONCE
Status: COMPLETED | OUTPATIENT
Start: 2025-05-01 | End: 2025-05-01

## 2025-05-01 RX ORDER — IBUPROFEN 200 MG
1 TABLET ORAL DAILY
Status: DISCONTINUED | OUTPATIENT
Start: 2025-05-01 | End: 2025-05-02 | Stop reason: HOSPADM

## 2025-05-01 RX ORDER — GABAPENTIN 100 MG/1
200 CAPSULE ORAL 3 TIMES DAILY
Status: DISCONTINUED | OUTPATIENT
Start: 2025-05-01 | End: 2025-05-02 | Stop reason: HOSPADM

## 2025-05-01 RX ADMIN — MAGNESIUM OXIDE TAB 400 MG (241.3 MG ELEMENTAL MG) 400 MG: 400 (241.3 MG) TAB at 11:29

## 2025-05-01 RX ADMIN — ACETAMINOPHEN 650 MG: 325 TABLET, FILM COATED ORAL at 21:14

## 2025-05-01 RX ADMIN — HEPARIN SODIUM 5000 UNITS: 5000 INJECTION, SOLUTION INTRAVENOUS; SUBCUTANEOUS at 21:14

## 2025-05-01 RX ADMIN — HYDROMORPHONE HYDROCHLORIDE 2 MG: 2 TABLET ORAL at 02:01

## 2025-05-01 RX ADMIN — HYDROMORPHONE HYDROCHLORIDE 0.2 MG: 1 INJECTION, SOLUTION INTRAMUSCULAR; INTRAVENOUS; SUBCUTANEOUS at 21:42

## 2025-05-01 RX ADMIN — GABAPENTIN 200 MG: 100 CAPSULE ORAL at 21:14

## 2025-05-01 RX ADMIN — IBUPROFEN 600 MG: 600 TABLET, FILM COATED ORAL at 21:14

## 2025-05-01 RX ADMIN — LEVOTHYROXINE SODIUM 88 MCG: 0.09 TABLET ORAL at 06:41

## 2025-05-01 RX ADMIN — GABAPENTIN 100 MG: 100 CAPSULE ORAL at 08:44

## 2025-05-01 RX ADMIN — CEFAZOLIN SODIUM 2 G: 2 INJECTION, SOLUTION INTRAVENOUS at 02:02

## 2025-05-01 RX ADMIN — GUAIFENESIN 600 MG: 600 TABLET ORAL at 09:42

## 2025-05-01 RX ADMIN — POTASSIUM CHLORIDE 20 MEQ: 1500 TABLET, EXTENDED RELEASE ORAL at 11:29

## 2025-05-01 RX ADMIN — HYDROMORPHONE HYDROCHLORIDE 2 MG: 2 TABLET ORAL at 14:47

## 2025-05-01 RX ADMIN — ASPIRIN 81 MG: 81 TABLET, COATED ORAL at 08:44

## 2025-05-01 RX ADMIN — ACETAMINOPHEN 650 MG: 325 TABLET, FILM COATED ORAL at 02:01

## 2025-05-01 RX ADMIN — HYDROMORPHONE HYDROCHLORIDE 0.2 MG: 1 INJECTION, SOLUTION INTRAMUSCULAR; INTRAVENOUS; SUBCUTANEOUS at 03:54

## 2025-05-01 RX ADMIN — ACETAMINOPHEN 650 MG: 325 TABLET, FILM COATED ORAL at 08:44

## 2025-05-01 RX ADMIN — IBUPROFEN 600 MG: 600 TABLET, FILM COATED ORAL at 13:58

## 2025-05-01 RX ADMIN — GABAPENTIN 200 MG: 100 CAPSULE ORAL at 13:59

## 2025-05-01 RX ADMIN — NICOTINE 1 PATCH: 14 PATCH, EXTENDED RELEASE TRANSDERMAL at 09:42

## 2025-05-01 RX ADMIN — HYDROMORPHONE HYDROCHLORIDE 2 MG: 2 TABLET ORAL at 18:32

## 2025-05-01 RX ADMIN — ACETAMINOPHEN 650 MG: 325 TABLET, FILM COATED ORAL at 13:58

## 2025-05-01 RX ADMIN — SENNOSIDES AND DOCUSATE SODIUM 2 TABLET: 50; 8.6 TABLET ORAL at 21:14

## 2025-05-01 RX ADMIN — HYDROMORPHONE HYDROCHLORIDE 2 MG: 2 TABLET ORAL at 06:38

## 2025-05-01 RX ADMIN — KETOROLAC TROMETHAMINE 15 MG: 15 INJECTION, SOLUTION INTRAMUSCULAR; INTRAVENOUS at 08:44

## 2025-05-01 RX ADMIN — HEPARIN SODIUM 5000 UNITS: 5000 INJECTION, SOLUTION INTRAVENOUS; SUBCUTANEOUS at 06:42

## 2025-05-01 RX ADMIN — HYDROMORPHONE HYDROCHLORIDE 2 MG: 2 TABLET ORAL at 10:39

## 2025-05-01 ASSESSMENT — PAIN - FUNCTIONAL ASSESSMENT

## 2025-05-01 ASSESSMENT — COGNITIVE AND FUNCTIONAL STATUS - GENERAL
CLIMB 3 TO 5 STEPS WITH RAILING: A LITTLE
PERSONAL GROOMING: A LITTLE
CLIMB 3 TO 5 STEPS WITH RAILING: A LITTLE
MOVING FROM LYING ON BACK TO SITTING ON SIDE OF FLAT BED WITH BEDRAILS: A LITTLE
WALKING IN HOSPITAL ROOM: A LITTLE
WALKING IN HOSPITAL ROOM: A LITTLE
DRESSING REGULAR LOWER BODY CLOTHING: A LITTLE
MOVING TO AND FROM BED TO CHAIR: A LITTLE
TURNING FROM BACK TO SIDE WHILE IN FLAT BAD: A LITTLE
DAILY ACTIVITIY SCORE: 18
MOBILITY SCORE: 18
MOBILITY SCORE: 18
MOVING TO AND FROM BED TO CHAIR: A LITTLE
TURNING FROM BACK TO SIDE WHILE IN FLAT BAD: A LITTLE
TOILETING: A LITTLE
HELP NEEDED FOR BATHING: A LITTLE
DRESSING REGULAR UPPER BODY CLOTHING: A LITTLE
STANDING UP FROM CHAIR USING ARMS: A LITTLE
MOVING FROM LYING ON BACK TO SITTING ON SIDE OF FLAT BED WITH BEDRAILS: A LITTLE
EATING MEALS: A LITTLE
STANDING UP FROM CHAIR USING ARMS: A LITTLE

## 2025-05-01 ASSESSMENT — PAIN SCALES - GENERAL
PAINLEVEL_OUTOF10: 5 - MODERATE PAIN
PAINLEVEL_OUTOF10: 7
PAINLEVEL_OUTOF10: 9
PAINLEVEL_OUTOF10: 7
PAINLEVEL_OUTOF10: 9
PAINLEVEL_OUTOF10: 6
PAINLEVEL_OUTOF10: 10 - WORST POSSIBLE PAIN
PAINLEVEL_OUTOF10: 7
PAINLEVEL_OUTOF10: 3
PAINLEVEL_OUTOF10: 0 - NO PAIN
PAINLEVEL_OUTOF10: 4
PAINLEVEL_OUTOF10: 10 - WORST POSSIBLE PAIN
PAINLEVEL_OUTOF10: 9
PAINLEVEL_OUTOF10: 6
PAINLEVEL_OUTOF10: 3

## 2025-05-01 ASSESSMENT — PAIN DESCRIPTION - LOCATION
LOCATION: ARM
LOCATION: CHEST
LOCATION: CHEST

## 2025-05-01 ASSESSMENT — PAIN DESCRIPTION - ORIENTATION
ORIENTATION: RIGHT

## 2025-05-01 ASSESSMENT — ACTIVITIES OF DAILY LIVING (ADL): ADL_ASSISTANCE: NEEDS ASSISTANCE

## 2025-05-01 NOTE — CONSULTS
"Nutrition Initial Assessment:   Nutrition Assessment    Reason for Assessment: Provider consult order    Patient is a 69 y.o. female presenting s/p paratracheal mass excision.     Medical History[1]     Nutrition History:  Energy Intake: Poor < 50 %  Food and Nutrient History: Poor appetite since 9/2024. Main  was reduced sense of smell and taste. Has tried protein shakes, ice cream/milkshakes but dislikes ONS and states dairy products causing odd aftertaste. Complaints of cotton mouth at times. Also issues with dentures not fitting, edentulousness limits exploring crunchy/hard textures.       Anthropometrics:  Height: 162.6 cm (5' 4\")   Weight: (!) 39 kg (85 lb 15.7 oz)      IBW/kg (Dietitian Calculated): 54.5 kg  Percent of IBW: 72 %                      Weight History:   Wt Readings from Last 20 Encounters:   04/30/25 (!) 39 kg (85 lb 15.7 oz)   04/15/25 (!) 39 kg (86 lb)   03/21/25 (!) 38.9 kg (85 lb 12.8 oz)   03/19/25 (!) 38.1 kg (84 lb)   02/11/25 (!) 39.5 kg (87 lb)   01/30/25 (!) 40.9 kg (90 lb 2.7 oz)   01/23/25 (!) 42 kg (92 lb 9.6 oz)   01/21/25 (!) 42.4 kg (93 lb 6.4 oz)   12/19/24 (!) 44 kg (97 lb)   12/02/24 (!) 43.8 kg (96 lb 9.6 oz)   11/01/24 47.2 kg (104 lb)       Weight Change %:  Weight History / % Weight Change: usual BW over 100#. States she gained weight in snf up to ~120#, followed by drastic weight loss in the fall. Continued to lose weight, \"5# lost at every doctors visit:\". Feels she actually gained ~3.5# at time of hospital admit (~82# up to 85#).  Significant Weight Loss: Yes  Interpretation of Weight Loss: >10% in 6 months    Nutrition Focused Physical Exam Findings:  Subcutaneous Fat Loss:   Orbital Fat Pads: Severe (dark circles, hollowing and loose skin)  Buccal Fat Pads: Severe (hollow, sunken and narrow face)  Triceps: Severe (negligible fat tissue)  Muscle Wasting:  Temporalis: Severe (hollowed scooping depression)  Pectoralis (Clavicular Region): Severe (protruding " prominent clavicle)  Deltoid/Trapezius: Severe (squared shoulders, acromion process prominent)  Interosseous: Severe (depressed area between thumb and forefinger)  Quadriceps: Severe (depressions on inner and outer thigh)  Gastrocnemius: Severe (minimal muscle definition)  Edema:  Edema: none  Physical Findings:  Skin:  (surgical site, chest tube)  Digestive System Findings: Anorexia, Constipation  Mouth Findings: Chewing difficulty, Dysgeusia, Xerostomia  Teeth Findings: Edentulous, Ill fitting denture    Nutrition Significant Labs:  CBC Trend:   Results from last 7 days   Lab Units 05/01/25  0717   WBC AUTO x10*3/uL 14.0*   RBC AUTO x10*6/uL 3.00*   HEMOGLOBIN g/dL 10.0*   HEMATOCRIT % 30.2*   MCV fL 101*   PLATELETS AUTO x10*3/uL 178    , A1C:  Lab Results   Component Value Date    HGBA1C 5.2 12/02/2024   , BG POCT trend:    , Liver Function Trend:    , Renal Lab Trend:   Results from last 7 days   Lab Units 05/01/25  0717   POTASSIUM mmol/L 3.7   SODIUM mmol/L 138   MAGNESIUM mg/dL 1.86   EGFR mL/min/1.73m*2 >90   BUN mg/dL 8   CREATININE mg/dL 0.57    , Vit D:   Lab Results   Component Value Date    VITD25 29 (A) 02/15/2020        Nutrition Specific Medications:  Scheduled medications  Scheduled Medications[2]  Continuous medications  Continuous Medications[3]  PRN medications  PRN Medications[4]      I/O:   Last BM Date:  (need post op BM);      Dietary Orders (From admission, onward)       Start     Ordered    05/01/25 0647  Adult diet Regular  Diet effective now        Question Answer Comment   Diet type Regular    Special Instructions: Advance as tolerated        05/01/25 0646    04/30/25 1527  May Participate in Room Service  ( ROOM SERVICE MAY PARTICIPATE)  Once        Question:  .  Answer:  Yes    04/30/25 1526                     Estimated Needs:   Total Energy Estimated Needs in 24 hours (kCal): 1350 kCal (+)  Method for Estimating Needs: 35 kcals/kg x 39 kgs  Total Protein Estimated Needs in 24  Hours (g): 60 g (+)  Method for Estimating 24 Hour Protein Needs: ~1.5 gm/kg x 39 kgs  Total Fluid Estimated Needs in 24 Hours (mL): 1200 mL  Method for Estimating 24 Hour Fluid Needs: 30mls/kcal/d or per provider        Nutrition Diagnosis   Malnutrition Diagnosis  Patient has Malnutrition Diagnosis: Yes  Diagnosis Status: New  Malnutrition Diagnosis: Severe malnutrition related to chronic disease or condition  As Evidenced by: unintentional >10% weight loss in 6 months, severe muscle wasting & fat loss on exam, BMI < 15            Nutrition Interventions/Recommendations   Nutrition prescription for oral nutrition    Nutrition Recommendations:  High calorie, high protien diet now & upon discharge.   Declines ONS like Boost or Ensure.    Would benefit from daily multivitamin.  Also encouraged lemon hard candies to help with altered tastes/palate cleansing.     Nutrition Interventions/Goals:   Meals and Snacks: Energy-modified diet, Protein-modified diet  Goal: High calorie/protein for weight restoration --- discussed & provided pt with handout including sample menus and recipes.  Goal: trial of Gelatein Plus if not discharging (160 kcals & 20gm protein each)          Nutrition Monitoring and Evaluation   Food/Nutrient Related History Monitoring  Monitoring and Evaluation Plan: Estimated Energy Intake  Estimated Energy Intake: Energy intake 50 -75% of estimated energy needs    Anthropometric Measurements  Monitoring and Evaluation Plan: Body weight  Body Weight: Body weight - Promote weight restoration    Biochemical Data, Medical Tests and Procedures  Monitoring and Evaluation Plan: Electrolyte/renal panel  Electrolyte and Renal Panel: Electrolytes within normal limits         Goal Status: New goal(s) identified    Time Spent (min): 60 minutes              [1]   Past Medical History:  Diagnosis Date    COPD (chronic obstructive pulmonary disease) (Multi)     Disease of thyroid gland     PONV (postoperative nausea  and vomiting)    [2] acetaminophen, 650 mg, oral, q6h  aspirin, 81 mg, oral, Daily  gabapentin, 200 mg, oral, TID  guaiFENesin, 600 mg, oral, BID  heparin (porcine), 5,000 Units, subcutaneous, q8h  ibuprofen, 600 mg, oral, q6h YOLA  levothyroxine, 88 mcg, oral, Daily  lidocaine, 1 patch, transdermal, Daily  nicotine, 1 patch, transdermal, Daily  sennosides-docusate sodium, 2 tablet, oral, BID     [3]    [4] PRN medications: HYDROmorphone, HYDROmorphone, HYDROmorphone, ipratropium-albuteroL, naloxone, ondansetron, oxygen

## 2025-05-01 NOTE — PROGRESS NOTES
05/01/25 5203   Discharge Planning   Living Arrangements Spouse/significant other   Support Systems Spouse/significant other   Assistance Needed Independent   Type of Residence Private residence   Who is requesting discharge planning? Provider   Home or Post Acute Services None   Expected Discharge Disposition Home   Does the patient need discharge transport arranged? No     Met with patient and introduced myself as Care Coordinator and member of the discharge planning team.  Pt is s/p paratracheal mass excision. She plans to return home at time of discharge with her .  She is independent in ADL's . No home care needs were identified at this time. Care Coordinator will continue to follow for home going needs.

## 2025-05-01 NOTE — HOSPITAL COURSE
Jaja Bradshaw is a 69 y.o. female with a history of a right para/pretracheal mass.  This was seen on prior CT scan, she underwent a cervical mediastinoscopy at which time we felt we got a biopsy of this mass, results of this and lymph node showed only granulomatous disease. However, this lesion continued to grow and patient has diffuse pain/myasthenias, weakness and remains concerning.   She is now status post robotic assisted paratracheal mass excision on 4/30/2025.

## 2025-05-01 NOTE — PROGRESS NOTES
"Thoracic Surgery Progress Note  5/1/2025    Jaja Bradshaw is a 69 y.o. female with a history of a right para/pretracheal mass.  This was seen on prior CT scan, she underwent a cervical mediastinoscopy at which time we felt we got a biopsy of this mass, results of this and lymph node showed only granulomatous disease. However, this lesion continued to grow and patient has diffuse pain/myasthenias, weakness and remains concerning.   She is now 1 Day Post-Op status post robotic assisted paratracheal mass excision on 4/30/2025.     Overnight issues: NAEON.  \"New\" airleak seen in PACU, continues today on exam, resolved with xeroform occlusive dressing.  Pain management continues to be challenging.     Physical Exam:  General: She is a pleasant female currently in mild distress 2/2 discomfort.  Visit Vitals  /62 (BP Location: Right arm)   Pulse 93   Temp 36.5 °C (97.7 °F) (Temporal)   Resp 20   Wt (!) 39 kg (85 lb 15.7 oz)   SpO2 92%   BMI 14.76 kg/m²   Smoking Status Every Day   BSA 1.33 m²     Body mass index is 14.76 kg/m².   HEENT: Normocephalic and atraumatic.   NECK: Supple. Trach midline. No JVD.   CHEST: Breathing somewhat comfortably on RA.  Chest tube to WS, initial leak resolved with addition of xeroform dressing.  Of note, generous opening that was not sutured shut around chest tube.   HEART: Regular rate and rhythm. NSR per tele review.   ABDOMEN: Soft, flat, nontender.   : voiding    NEUROLOGIC: Alert and oriented. Grossly intact.   EXTREMITIES: Moves all extremities equally.  Pedal pulses are palpable. No lower extremity edema. No calf tenderness.  Endorses paraesthesia over right medial forearm aspect.     Diagnostics:     Intake/Output Summary (Last 24 hours) at 5/1/2025 1217  Last data filed at 5/1/2025 1000  Gross per 24 hour   Intake 810 ml   Output 1451 ml   Net -641 ml     Results from last 7 days   Lab Units 05/01/25  0717   WBC AUTO x10*3/uL 14.0*   HEMOGLOBIN g/dL 10.0*   HEMATOCRIT % " "30.2*   PLATELETS AUTO x10*3/uL 178     Results from last 7 days   Lab Units 05/01/25  0717   SODIUM mmol/L 138   POTASSIUM mmol/L 3.7   CHLORIDE mmol/L 99   CO2 mmol/L 27   BUN mg/dL 8   CREATININE mg/dL 0.57   GLUCOSE mg/dL 81   CALCIUM mg/dL 9.8     Results from last 7 days   Lab Units 05/01/25  0717   SODIUM mmol/L 138   POTASSIUM mmol/L 3.7   CHLORIDE mmol/L 99   CO2 mmol/L 27   BUN mg/dL 8   CREATININE mg/dL 0.57   GLUCOSE mg/dL 81   CALCIUM mg/dL 9.8     Scheduled medications  Scheduled Medications[1]  Continuous medications  Continuous Medications[2]  PRN medications  PRN Medications[3]    Imaging:   AM CXR reviewed. Expected post op changes and chest tube placement. No clinically significant pneumothorax. No formal report at this time.     Assessment:  Jaja Bradshaw is a 69 y.o. female status post post robotic assisted paratracheal mass excision on 4/30/2025 with Dr. Gonzalez. \"New\" airleak seen in PACU, continues today on exam, resolved with xeroform occlusive dressing.  Pain management continues to be challenging.     Plan:  Neurology: post operative pain  -continue oral regimen of pain control with dilaudid and Tylenol.  -Scheduled Motrin   -Bowel regimen available for constipation secondary to pain medication   -Out of bed to the chair throughout the day  -Encourage ambulation as tolerated  - lidocaine patches   - neurontin     Cardiovascular:   -Continue telemetry  -Vital signs every four hours  -Continue home regimen of  ASA  -Replace electrolytes as needed for K >4, Mg >2    Pulmonology: post op atelectasis, chest tube management, current smoker   -Encourage incentive spirometer use every hour  -Continue pulmonary hygiene  -Wean oxygen as tolerated   -Maintain chest tube to WS---removal this PM with addition of a purse string stitch  -Obtain daily CXR  -Monitor and record chest tube drainage every shift  - nicotine patches   - mucinex    Gastrointestinal:   - Regular diet as tolerated + " supplements  - Zofran available for nausea  - scheduled colace, PRN bowel regimen    Genitourinary:   -voiding  -Continue to monitor daily electrolytes with routine BMPs   -Adequate urine output    Infectious Disease:   -Continue to trend daily temperatures and WBC count to monitor for signs of post-operative infection   -Monitor surgical incisions for signs of infection   -Perioperative antibiotics completed     Hematology:   -Monitor for signs of acute blood loss  -Trend CBCs     Endocrine:  hypothyroidism  - home synthroid    DVT Prophylaxis:   -Continue subcutaneous heparin and SCDs, early ambulation    Disposition:  -Plan for discharge to home once stable from a surgical standpoint.  -Continue to assess for home-going needs     Patient seen and examined by this provider. Plan of care discussed with attending Dr. Lisa Sun, APRN-CNP  Department of Thoracic and Esophageal Surgery  Thoracic Surgery pager [phone]              [1] acetaminophen, 650 mg, oral, q6h  aspirin, 81 mg, oral, Daily  gabapentin, 200 mg, oral, TID  guaiFENesin, 600 mg, oral, BID  heparin (porcine), 5,000 Units, subcutaneous, q8h  ibuprofen, 600 mg, oral, q6h YOLA  levothyroxine, 88 mcg, oral, Daily  lidocaine, 1 patch, transdermal, Daily  nicotine, 1 patch, transdermal, Daily  sennosides-docusate sodium, 2 tablet, oral, BID  [2]    [3] PRN medications: HYDROmorphone, HYDROmorphone, HYDROmorphone, ipratropium-albuteroL, naloxone, ondansetron, oxygen

## 2025-05-01 NOTE — PROGRESS NOTES
Physical Therapy    Physical Therapy Evaluation    Patient Name: Jaja Bradshaw  MRN: [id number]  Department: [place] [id number]  Room: [address] [place] [address]  Today's Date: 5/1/2025   Time Calculation  Start Time: 0753  Stop Time: 0811  Time Calculation (min): 18 min    Assessment/Plan   PT Assessment  PT Assessment Results: Decreased strength, Decreased endurance, Impaired balance, Decreased mobility, Decreased coordination  Rehab Prognosis: Good  Barriers to Discharge Home: Physical needs  Physical Needs: Ambulating household distances limited by function/safety, Stair navigation into home limited by function/safety, Intermittent mobility assistance needed  Evaluation/Treatment Tolerance: Patient tolerated treatment well  Medical Staff Made Aware: Yes  Strengths: Support of Caregivers  Barriers to Participation: Comorbidities (anxiety)  End of Session Communication: Bedside nurse  Assessment Comment: pt s/p pt is s/p PARATRACHEAL MASS EXISION, ROBOT-ASSISTED 65117 - [place] THORACOSCOPY W/EXC MEDIASTINAL CYST TUMOR/MASS and would benefit from services as pt is at risk for falls. pt is unsteady and with decrease mobility .  End of Session Patient Position: Up in chair, Alarm on ( in the room)  IP OR SWING BED PT PLAN  Inpatient or Swing Bed: Inpatient  PT Plan  Treatment/Interventions: Bed mobility, Transfer training, Gait training, Stair training, Neuromuscular re-education, Balance training, Endurance training, Therapeutic exercise, Range of motion, Home exercise program, Therapeutic activity  PT Plan: Ongoing PT  PT Frequency: 4 times per week  PT Discharge Recommendations: Low intensity level of continued care (assist with stairs and mobility initially)  Equipment Recommended upon Discharge: Wheeled walker  PT Recommended Transfer Status: Assist x1  PT - OK to Discharge: Yes (Eval received and completed. See recs.)    Subjective   General Visit Information:  General  Reason for Referral: pt is s/p PARATRACHEAL MASS EXISION,  ROBOT-ASSISTED  [id number] - [place] THORACOSCOPY W/EXC MEDIASTINAL CYST TUMOR/MASS       Past Medical History Relevant to Rehab: pt with no past medical hisotry other than Mediastinotomy (N/A, 2025).  Family/Caregiver Present: Yes  Caregiver Feedback:  present and supportive.  Prior to Session Communication: Bedside nurse  Patient Position Received: Bed, 3 rail up, Alarm on  General Comment: pt very anxious but willing to participate. pt required constant soothing to calm anxiety.  Home Living:  Home Living  Type of Home: House  Lives With: Spouse (daughter, and granddaughter as well)  Home Adaptive Equipment: None (has a chair in the shower)  Home Layout: One level  Home Access: Stairs to enter with rails ( putting rail on)  Entrance Stairs-Number of Steps: 2  Bathroom Shower/Tub: Walk-in shower  Prior Level of Function:  Prior Function Per Pt/Caregiver Report  Level of [place]: Independent with ADLs and functional transfers, Independent with homemaking with ambulation  ADL Assistance: Needs assistance  [place]:  ( was assisting)  Homemaking Assistance: Needs assistance (daughter and  assisting)  Ambulatory Assistance:  (pt furniture walks and  helps her walk the full distance to the bedroom)  Precautions:  Precautions  Medical Precautions: Cardiac precautions, Fall precautions  Post-Surgical Precautions: Move in the Tube       Vital Signs Comment: sittin/54  HR 94 bpm  and standing 108/66  bpm with complaint of dizziness initially but decreased complaint after 2 mins of standing.     Objective   Pain:  Pain Assessment  Pain Assessment: 0-10  0-10 (Numeric) Pain Score: 0 - No pain  Cognition:  Cognition  Orientation Level: Oriented X4  Insight: Mild  Impulsive: Moderately    General Assessments:  Activity Tolerance  Endurance: Tolerates 10 - 20 min exercise with multiple rests    Sensation  Light Touch: No apparent deficits                   Postural Control  Postural  Control: Impaired  Posture Comment: forward flexed with rounded shoulders.    Static Sitting Balance  Static Sitting-Balance Support: Feet supported  Static Sitting-Level of Assistance: Close supervision  Static Sitting-Comment/Number of Minutes: pt sat EOB for 5 mins    Static Standing Balance  Static Standing-Balance Support: Bilateral upper extremity supported (ww)  Static Standing-Level of Assistance: Minimum assistance (pt with posterior lean)  Functional Assessments:  Bed Mobility  Bed Mobility: Yes  Bed Mobility 1  Bed Mobility 1: Log roll, Supine to sitting  Level of Assistance 1: Minimum assistance  Bed Mobility Comments 1: HOB elevated and use of HR    Transfers  Transfer: Yes  Transfer 1  Transfer From 1: Sit to, Stand to  Transfer to 1: Stand, Sit  Technique 1: Sit to stand, Stand to sit  Transfer Device 1: Walker  Transfer Level of Assistance 1: Minimum assistance  Transfers 2  Transfer From 2: Bed to  Transfer to 2: Chair with arms  Technique 2: Sit to stand, Stand to sit  Transfer Device 2: Walker  Transfer Level of Assistance 2: Minimum assistance    Ambulation/Gait Training  Ambulation/Gait Training Performed: Yes  Ambulation/Gait Training 1  Surface 1: Level tile  Device 1: Rolling walker  Assistance 1: Minimum assistance  Quality of Gait 1: Narrow base of support, Inconsistent stride length, Decreased step length, Shuffling gait (pt with decreae foot clearance and decrease step length. some posterior lean)  Comments/Distance (ft) 1: pt ambulated 15 ft in the room and declined any further.    Stairs  Stairs: No       Extremity/Trunk Assessments:  RLE   RLE : Exceptions to WFL  Strength RLE  RLE Overall Strength: Greater than or equal to 3/5 as evidenced by functional mobility  LLE   LLE : Exceptions to WFL  Strength LLE  LLE Overall Strength: Greater than or equal to 3/5 as evidenced by functional mobility  Outcome Measures:  [facility] Basic Mobility  Turning from your back to your side while in a flat  bed without using bedrails: A little  Moving from lying on your back to sitting on the side of a flat bed without using bedrails: A little  Moving to and from bed to chair (including a wheelchair): A little  Standing up from a chair using your arms (e.g. wheelchair or bedside chair): A little  To walk in hospital room: A little  Climbing 3-5 steps with railing: A little  Basic Mobility - Total Score: 18    Encounter Problems       Encounter Problems (Active)       Balance       STG - Maintains dynamic standing balance with upper extremity support for 3 mins without LOB  (Progressing)       Start:  05/01/25    Expected End:  05/15/25       INTERVENTIONS:1. Practice standing with minimal support.2. Educate patient about standing tolerance.3. Educate patient about independence with gait, transfers, and ADL's.4. Educate patient about use of assistive device.5. Educate patient about self-directed care.            Mobility       STG - Patient will ambulate 100 ft with ww and SBA  (Progressing)       Start:  05/01/25    Expected End:  05/15/25            STG - Patient will ascend and descend four to six stairs with CGA and HR  (Progressing)       Start:  05/01/25    Expected End:  05/15/25               PT Transfers       STG - Transfer from bed to chair with SBA  (Progressing)       Start:  05/01/25    Expected End:  05/15/25            STG - Patient will perform bed mobility I (Progressing)       Start:  05/01/25    Expected End:  05/15/25                   Education Documentation  Precautions, taught by Nydia Ayala PT at 5/1/2025 10:43 AM.  Learner: Family, Patient  Readiness: Acceptance  Method: Explanation  Response: Verbalizes Understanding    Body Mechanics, taught by Nydia Ayala PT at 5/1/2025 10:43 AM.  Learner: Family, Patient  Readiness: Acceptance  Method: Explanation  Response: Verbalizes Understanding    Mobility Training, taught by Nydia Ayala PT at 5/1/2025 10:43 AM.  Learner:  Family, Patient  Readiness: Acceptance  Method: Explanation  Response: Verbalizes Understanding    Education Comments  No comments found.

## 2025-05-02 ENCOUNTER — APPOINTMENT (OUTPATIENT)
Dept: RADIOLOGY | Facility: HOSPITAL | Age: 70
End: 2025-05-02
Payer: MEDICARE

## 2025-05-02 ENCOUNTER — PHARMACY VISIT (OUTPATIENT)
Dept: PHARMACY | Facility: CLINIC | Age: 70
End: 2025-05-02
Payer: COMMERCIAL

## 2025-05-02 VITALS
WEIGHT: 85.98 LBS | HEART RATE: 84 BPM | DIASTOLIC BLOOD PRESSURE: 58 MMHG | RESPIRATION RATE: 18 BRPM | BODY MASS INDEX: 14.68 KG/M2 | HEIGHT: 64 IN | OXYGEN SATURATION: 92 % | SYSTOLIC BLOOD PRESSURE: 103 MMHG | TEMPERATURE: 97.2 F

## 2025-05-02 LAB
ANION GAP SERPL CALC-SCNC: 13 MMOL/L (ref 10–20)
BUN SERPL-MCNC: 8 MG/DL (ref 6–23)
CALCIUM SERPL-MCNC: 9.7 MG/DL (ref 8.6–10.6)
CHLORIDE SERPL-SCNC: 102 MMOL/L (ref 98–107)
CO2 SERPL-SCNC: 29 MMOL/L (ref 21–32)
CREAT SERPL-MCNC: 0.4 MG/DL (ref 0.5–1.05)
EGFRCR SERPLBLD CKD-EPI 2021: >90 ML/MIN/1.73M*2
ERYTHROCYTE [DISTWIDTH] IN BLOOD BY AUTOMATED COUNT: 13 % (ref 11.5–14.5)
GLUCOSE SERPL-MCNC: 90 MG/DL (ref 74–99)
HCT VFR BLD AUTO: 32.3 % (ref 36–46)
HGB BLD-MCNC: 10.5 G/DL (ref 12–16)
MAGNESIUM SERPL-MCNC: 1.74 MG/DL (ref 1.6–2.4)
MCH RBC QN AUTO: 33.1 PG (ref 26–34)
MCHC RBC AUTO-ENTMCNC: 32.5 G/DL (ref 32–36)
MCV RBC AUTO: 102 FL (ref 80–100)
NRBC BLD-RTO: 0 /100 WBCS (ref 0–0)
PATH REV-IMMUNOHEMATOLOGY-PR30: NORMAL
PLATELET # BLD AUTO: 190 X10*3/UL (ref 150–450)
POTASSIUM SERPL-SCNC: 3.7 MMOL/L (ref 3.5–5.3)
RBC # BLD AUTO: 3.17 X10*6/UL (ref 4–5.2)
SODIUM SERPL-SCNC: 140 MMOL/L (ref 136–145)
WBC # BLD AUTO: 9.8 X10*3/UL (ref 4.4–11.3)

## 2025-05-02 PROCEDURE — 71045 X-RAY EXAM CHEST 1 VIEW: CPT

## 2025-05-02 PROCEDURE — 71045 X-RAY EXAM CHEST 1 VIEW: CPT | Performed by: RADIOLOGY

## 2025-05-02 PROCEDURE — RXMED WILLOW AMBULATORY MEDICATION CHARGE

## 2025-05-02 PROCEDURE — 2500000004 HC RX 250 GENERAL PHARMACY W/ HCPCS (ALT 636 FOR OP/ED): Performed by: NURSE PRACTITIONER

## 2025-05-02 PROCEDURE — 83735 ASSAY OF MAGNESIUM: CPT | Performed by: PHYSICIAN ASSISTANT

## 2025-05-02 PROCEDURE — 80048 BASIC METABOLIC PNL TOTAL CA: CPT | Performed by: PHYSICIAN ASSISTANT

## 2025-05-02 PROCEDURE — 2500000002 HC RX 250 W HCPCS SELF ADMINISTERED DRUGS (ALT 637 FOR MEDICARE OP, ALT 636 FOR OP/ED): Performed by: PHYSICIAN ASSISTANT

## 2025-05-02 PROCEDURE — 2500000001 HC RX 250 WO HCPCS SELF ADMINISTERED DRUGS (ALT 637 FOR MEDICARE OP): Performed by: PHYSICIAN ASSISTANT

## 2025-05-02 PROCEDURE — 2500000001 HC RX 250 WO HCPCS SELF ADMINISTERED DRUGS (ALT 637 FOR MEDICARE OP): Performed by: NURSE PRACTITIONER

## 2025-05-02 PROCEDURE — 2500000004 HC RX 250 GENERAL PHARMACY W/ HCPCS (ALT 636 FOR OP/ED): Performed by: PHYSICIAN ASSISTANT

## 2025-05-02 PROCEDURE — 99239 HOSP IP/OBS DSCHRG MGMT >30: CPT | Performed by: NURSE PRACTITIONER

## 2025-05-02 PROCEDURE — 36415 COLL VENOUS BLD VENIPUNCTURE: CPT | Performed by: PHYSICIAN ASSISTANT

## 2025-05-02 PROCEDURE — 85027 COMPLETE CBC AUTOMATED: CPT | Performed by: PHYSICIAN ASSISTANT

## 2025-05-02 RX ORDER — GUAIFENESIN 600 MG/1
600 TABLET, EXTENDED RELEASE ORAL 2 TIMES DAILY
Qty: 28 TABLET | Refills: 0 | Status: SHIPPED | OUTPATIENT
Start: 2025-05-02

## 2025-05-02 RX ORDER — IBUPROFEN 200 MG
1 TABLET ORAL DAILY
Qty: 14 PATCH | Refills: 0 | Status: SHIPPED | OUTPATIENT
Start: 2025-05-03

## 2025-05-02 RX ORDER — LANOLIN ALCOHOL/MO/W.PET/CERES
400 CREAM (GRAM) TOPICAL ONCE
Status: COMPLETED | OUTPATIENT
Start: 2025-05-02 | End: 2025-05-02

## 2025-05-02 RX ORDER — LIDOCAINE 560 MG/1
1 PATCH PERCUTANEOUS; TOPICAL; TRANSDERMAL DAILY
Start: 2025-05-03

## 2025-05-02 RX ORDER — POTASSIUM CHLORIDE 1.5 G/1.58G
20 POWDER, FOR SOLUTION ORAL ONCE
Status: COMPLETED | OUTPATIENT
Start: 2025-05-02 | End: 2025-05-02

## 2025-05-02 RX ORDER — IBUPROFEN 600 MG/1
600 TABLET ORAL EVERY 6 HOURS SCHEDULED
Qty: 30 TABLET | Refills: 0 | Status: SHIPPED | OUTPATIENT
Start: 2025-05-02

## 2025-05-02 RX ORDER — HYDROMORPHONE HYDROCHLORIDE 2 MG/1
2 TABLET ORAL EVERY 4 HOURS PRN
Qty: 15 TABLET | Refills: 0 | Status: SHIPPED | OUTPATIENT
Start: 2025-05-02

## 2025-05-02 RX ADMIN — HYDROMORPHONE HYDROCHLORIDE 2 MG: 2 TABLET ORAL at 13:27

## 2025-05-02 RX ADMIN — ACETAMINOPHEN 650 MG: 325 TABLET, FILM COATED ORAL at 06:12

## 2025-05-02 RX ADMIN — IBUPROFEN 600 MG: 600 TABLET, FILM COATED ORAL at 06:12

## 2025-05-02 RX ADMIN — GUAIFENESIN 600 MG: 600 TABLET ORAL at 08:39

## 2025-05-02 RX ADMIN — POTASSIUM CHLORIDE 20 MEQ: 1.5 POWDER, FOR SOLUTION ORAL at 12:08

## 2025-05-02 RX ADMIN — HYDROMORPHONE HYDROCHLORIDE 2 MG: 2 TABLET ORAL at 08:39

## 2025-05-02 RX ADMIN — ACETAMINOPHEN 650 MG: 325 TABLET, FILM COATED ORAL at 12:02

## 2025-05-02 RX ADMIN — HEPARIN SODIUM 5000 UNITS: 5000 INJECTION, SOLUTION INTRAVENOUS; SUBCUTANEOUS at 06:12

## 2025-05-02 RX ADMIN — MAGNESIUM OXIDE TAB 400 MG (241.3 MG ELEMENTAL MG) 400 MG: 400 (241.3 MG) TAB at 12:03

## 2025-05-02 RX ADMIN — ASPIRIN 81 MG: 81 TABLET, COATED ORAL at 08:39

## 2025-05-02 RX ADMIN — IBUPROFEN 600 MG: 600 TABLET, FILM COATED ORAL at 12:03

## 2025-05-02 RX ADMIN — GABAPENTIN 200 MG: 100 CAPSULE ORAL at 08:39

## 2025-05-02 RX ADMIN — LEVOTHYROXINE SODIUM 88 MCG: 0.09 TABLET ORAL at 06:12

## 2025-05-02 ASSESSMENT — COGNITIVE AND FUNCTIONAL STATUS - GENERAL
HELP NEEDED FOR BATHING: A LITTLE
MOVING FROM LYING ON BACK TO SITTING ON SIDE OF FLAT BED WITH BEDRAILS: A LITTLE
DAILY ACTIVITIY SCORE: 18
PERSONAL GROOMING: A LITTLE
CLIMB 3 TO 5 STEPS WITH RAILING: A LITTLE
EATING MEALS: A LITTLE
MOBILITY SCORE: 18
DRESSING REGULAR UPPER BODY CLOTHING: A LITTLE
DRESSING REGULAR LOWER BODY CLOTHING: A LITTLE
WALKING IN HOSPITAL ROOM: A LITTLE
TOILETING: A LITTLE
TURNING FROM BACK TO SIDE WHILE IN FLAT BAD: A LITTLE
STANDING UP FROM CHAIR USING ARMS: A LITTLE
MOVING TO AND FROM BED TO CHAIR: A LITTLE

## 2025-05-02 ASSESSMENT — PAIN SCALES - GENERAL
PAINLEVEL_OUTOF10: 7
PAINLEVEL_OUTOF10: 7
PAINLEVEL_OUTOF10: 3

## 2025-05-02 ASSESSMENT — PAIN - FUNCTIONAL ASSESSMENT
PAIN_FUNCTIONAL_ASSESSMENT: 0-10

## 2025-05-02 NOTE — PROGRESS NOTES
"Thoracic Surgery Progress Note  5/2/2025    Jaja Bradshaw is a 69 y.o. female with a history of a right para/pretracheal mass.  This was seen on prior CT scan, she underwent a cervical mediastinoscopy at which time we felt we got a biopsy of this mass, results of this and lymph node showed only granulomatous disease. However, this lesion continued to grow and patient has diffuse pain/myasthenias, weakness and remains concerning.   She is now 2 Days Post-Op status post robotic assisted paratracheal mass excision on 4/30/2025.     Overnight issues: NAEON. CT removed yesterday afternoon. Stitch to close chest tube site. Apical Ptx after chest tube removal, stable on repeat PM imaging.  This am, increased but not extensive subQ emphysema and ptx now with lateral component.     Physical Exam:  General: She is a pleasant female currently in no distress.   Visit Vitals  /58 (BP Location: Right arm, Patient Position: Lying)   Pulse 84   Temp 36.2 °C (97.2 °F) (Temporal)   Resp 18   Ht 1.626 m (5' 4\")   Wt (!) 39 kg (85 lb 15.7 oz)   SpO2 92%   BMI 14.76 kg/m²   Smoking Status Every Day   BSA 1.33 m²     Body mass index is 14.76 kg/m².   HEENT: Normocephalic and atraumatic.   NECK: Supple. Trach midline. No JVD.   CHEST: Breathing comfortably on RA.  Occlusive dressing over previous chest tube site intact.   HEART: Regular rate and rhythm. NSR per tele review.   ABDOMEN: Soft, flat, nontender.   : voiding    NEUROLOGIC: Alert and oriented. Grossly intact.   EXTREMITIES: Moves all extremities equally.  Pedal pulses are palpable. No lower extremity edema. No calf tenderness.  Endorses paraesthesia over right medial forearm aspect.     Diagnostics:     Intake/Output Summary (Last 24 hours) at 5/2/2025 1023  Last data filed at 5/2/2025 0929  Gross per 24 hour   Intake 615 ml   Output 250 ml   Net 365 ml     Results from last 7 days   Lab Units 05/02/25  0753 05/01/25  0717   WBC AUTO x10*3/uL 9.8 14.0*   HEMOGLOBIN " g/dL 10.5* 10.0*   HEMATOCRIT % 32.3* 30.2*   PLATELETS AUTO x10*3/uL 190 178     Results from last 7 days   Lab Units 05/02/25  0753 05/01/25  0717   SODIUM mmol/L 140 138   POTASSIUM mmol/L 3.7 3.7   CHLORIDE mmol/L 102 99   CO2 mmol/L 29 27   BUN mg/dL 8 8   CREATININE mg/dL 0.40* 0.57   GLUCOSE mg/dL 90 81   CALCIUM mg/dL 9.7 9.8     Results from last 7 days   Lab Units 05/02/25  0753 05/01/25  0717   SODIUM mmol/L 140 138   POTASSIUM mmol/L 3.7 3.7   CHLORIDE mmol/L 102 99   CO2 mmol/L 29 27   BUN mg/dL 8 8   CREATININE mg/dL 0.40* 0.57   GLUCOSE mg/dL 90 81   CALCIUM mg/dL 9.7 9.8     Scheduled medications  Scheduled Medications[1]  Continuous medications  Continuous Medications[2]  PRN medications  PRN Medications[3]    Imaging:   Serial AM CXR reviewed. This am, increased but not extensive subQ emphysema and ptx now with lateral component. No formal read.     Assessment:  Jaja Bradshaw is a 69 y.o. female status post post robotic assisted paratracheal mass excision on 4/30/2025 with Dr. Gonzalez. CT removed 5/1 afternoon. Stitch to close chest tube site. Apical Ptx after chest tube removal, stable on repeat PM imaging.  This am, increased but not extensive subQ emphysema and ptx now with lateral component.     Plan:  Neurology: post operative pain  -continue oral regimen of pain control with dilaudid and Tylenol.  -Scheduled Motrin   -Bowel regimen available for constipation secondary to pain medication   -Out of bed to the chair throughout the day  -Encourage ambulation as tolerated  - lidocaine patches   - neurontin     Cardiovascular:   -Continue telemetry  -Vital signs every four hours  -Continue home regimen of  ASA  -Replace electrolytes as needed for K >4, Mg >2    Pulmonology: post op atelectasis, chest tube management, current smoker   -Encourage incentive spirometer use every hour  -Continue pulmonary hygiene  -Wean oxygen as tolerated   -Ct removed 5/1. Suture in place  - CXR at noon to  monitor subQ emphysema   -Monitor and record chest tube drainage every shift  - nicotine patches   - mucinex    Gastrointestinal:   - Regular diet as tolerated + supplements  - Zofran available for nausea  - scheduled colace, PRN bowel regimen    Genitourinary:   -voiding  -Continue to monitor daily electrolytes with routine BMPs   -Adequate urine output    Infectious Disease:   -Continue to trend daily temperatures and WBC count to monitor for signs of post-operative infection   -Monitor surgical incisions for signs of infection   -Perioperative antibiotics completed     Hematology:   -Monitor for signs of acute blood loss  -Trend CBCs     Endocrine:  hypothyroidism  - home synthroid    DVT Prophylaxis:   -Continue subcutaneous heparin and SCDs, early ambulation    Disposition:  -Plan for discharge to home this PM if serial CXR remains stable.   -Continue to assess for home-going needs     Patient seen and examined by this provider. Plan of care discussed with attending Dr. Lisa Sun, APRN-CNP  Department of Thoracic and Esophageal Surgery  Thoracic Surgery pager [phone]       Addendum-- PM imaging reviewed with Dr. Gonzalez.  Improvement of apical-lateral PTX.  Stable to minimally worse subcutaneous emphysema.         [1] acetaminophen, 650 mg, oral, q6h  aspirin, 81 mg, oral, Daily  gabapentin, 200 mg, oral, TID  guaiFENesin, 600 mg, oral, BID  heparin (porcine), 5,000 Units, subcutaneous, q8h  ibuprofen, 600 mg, oral, q6h YOLA  levothyroxine, 88 mcg, oral, Daily  lidocaine, 1 patch, transdermal, Daily  nicotine, 1 patch, transdermal, Daily  sennosides-docusate sodium, 2 tablet, oral, BID     [2]    [3] PRN medications: HYDROmorphone, HYDROmorphone, HYDROmorphone, ipratropium-albuteroL, naloxone, ondansetron, oxygen

## 2025-05-02 NOTE — DISCHARGE SUMMARY
Discharge Diagnosis  Mediastinal mass    Issues Requiring Follow-Up  Final pathology    Test Results Pending At Discharge  Pending Labs       Order Current Status    Surgical Pathology Exam In process            Hospital Course  Jaja Bradshaw is a 69 y.o. female with a history of a right para/pretracheal mass.  This was seen on prior CT scan, she underwent a cervical mediastinoscopy at which time we felt we got a biopsy of this mass, results of this and lymph node showed only granulomatous disease. However, this lesion continued to grow and patient has diffuse pain/myasthenias, weakness and remains concerning.   She is now status post robotic assisted paratracheal mass excision on 4/30/2025.     Chest tube removed 5/1.  Slowly resolving apical ptx after Chest tube removal.  Clinically stable.    At the time of discharge, her pain was controlled on an oral pain regimen, She was breathing comfortably on room air.  Shewas ambulating independently.  She was tolerating a regular diet without nausea. She was voiding regularly.      The patient was educated on post operative activity restrictions, dietary guidelines, and pain management. She will follow up with Dr. Gonzalez in the outpatient setting in two weeks with a CXR just prior to this visit. The patient has been instructed to add an OTC stool softeners or laxative while taking oral analgesia.  Deep breathing, coughing, and walking at home encouraged. All questions have been answered and concerns addressed until there were none.     Pertinent Physical Exam At Time of Discharge  See progress note from same day    Home Medications     Medication List      START taking these medications     guaiFENesin 600 mg 12 hr tablet; Commonly known as: Mucinex; Take 1   tablet (600 mg) by mouth 2 times a day. Do not crush, chew, or split.   HYDROmorphone 2 mg tablet; Commonly known as: Dilaudid; Take 1 tablet (2   mg) by mouth every 4 hours if needed for severe pain (7 -  10).   ibuprofen 600 mg tablet; Take 1 tablet (600 mg) by mouth every 6 hours.   lidocaine 4 % patch; Place 1 patch over 12 hours on the skin once daily.   Remove & discard patch within 12 hours or as directed by MD.; Start taking   on: May 3, 2025   nicotine 14 mg/24 hr patch; Commonly known as: Nicoderm CQ; Place 1   patch over 24 hours on the skin once daily.; Start taking on: May 3, 2025     CONTINUE taking these medications     acetaminophen 500 mg tablet; Commonly known as: Tylenol; Take 2 tablets   (1,000 mg) by mouth every 8 hours if needed for mild pain (1 - 3) or   moderate pain (4 - 6).   aspirin 81 mg EC tablet   gabapentin 100 mg capsule; Commonly known as: Neurontin; Take 2 capsules   (200 mg) by mouth 3 times a day.   levothyroxine 88 mcg tablet; Commonly known as: Synthroid, Levoxyl; Take   1 tablet (88 mcg) by mouth early in the morning.. Take on an empty stomach   at the same time each day, either 30 to 60 minutes prior to breakfast   Vitamin D3 25 mcg (1,000 units) tablet; Generic drug: cholecalciferol     STOP taking these medications     predniSONE 10 mg tablet; Commonly known as: Deltasone       Outpatient Follow-Up  Future Appointments   Date Time Provider Department [place]   5/20/2025  9:30 AM Vickie Gonzalez DO [id number] [facility]   5/27/2025 11:00 AM Neli Ovalles DO [id number] [facility]   6/6/2025 10:40 AM Janice Zambrano DO [id number] [facility]   6/20/2025 11:00 AM KAITY Norris, [facility] [id number] [facility]   7/7/2025 11:00 AM Cyndy Espitia MD, MS [id number] [facility]   11/10/2025  9:40 AM Riky Feliz MD [id number] [facility]   Time spent with discharge was >30 minutes and included explanation of discharge instructions, arranging homegoing prescriptions, checking OARRS, supplies and follow-up, and creating the discharge summary of the patient's hospialization.      KAITY [place]

## 2025-05-02 NOTE — PROGRESS NOTES
Physical Therapy                 Therapy Communication Note    Patient Name: Jaja Bradshaw  MRN: [id number]  Department: [place] [id number]  Room: [address] [place] [address]  Today's Date: 5/2/2025     Discipline: Physical Therapy    PT Missed Visit: Yes     Missed Visit Reason: Missed Visit Reason:  (Pt supine in bed, reports she did not sleep well. Declining therapy despite encouragement and education)    Missed Time: Attempt 923    Comment:

## 2025-05-02 NOTE — PROGRESS NOTES
Occupational Therapy                 Therapy Communication Note    Patient Name: Jaja Bradshaw  MRN: [id number]  Department:   Room: [facility]3081-A  Today's Date: 5/2/2025     Discipline: Occupational Therapy    1424 OT Screen: This 68 y/o F w/ R para/pretracheal mass and worsening generalized weakness and body aches presents s/p scheduled RA paratracheal mass excision 4/30. On approach pt is sitting on bench having already dressed herself and reports ambulating w/o device or need for assist. Pt and spouse report no concerns regarding home going. Continued skilled OT services are not indicated at this time.

## 2025-05-03 LAB
BLOOD EXPIRATION DATE: NORMAL
BLOOD EXPIRATION DATE: NORMAL
DISPENSE STATUS: NORMAL
DISPENSE STATUS: NORMAL
PRODUCT BLOOD TYPE: 5100
PRODUCT BLOOD TYPE: 5100
PRODUCT CODE: NORMAL
PRODUCT CODE: NORMAL
UNIT ABO: NORMAL
UNIT ABO: NORMAL
UNIT NUMBER: NORMAL
UNIT NUMBER: NORMAL
UNIT RH: NORMAL
UNIT RH: NORMAL
UNIT VOLUME: 279
UNIT VOLUME: 350
XM INTEP: NORMAL
XM INTEP: NORMAL

## 2025-05-05 NOTE — DOCUMENTATION CLARIFICATION NOTE
PATIENT:               RENZO EDWARDS  ACCT #:                  [id number]  MRN:                       [id number]  :                       1955  ADMIT DATE:       2025 5:42 AM  DISCH DATE:        2025 2:49 PM  RESPONDING PROVIDER #:        [id number]          PROVIDER RESPONSE TEXT:    I agree with dietician diagnosis of severe malnutrition on 25.    CDI QUERY TEXT:    Clarification    Instruction:    Based on your assessment of the patient and the clinical information, please provide the requested documentation by clicking on the appropriate radio button and enter any additional information if prompted.    Question: Please further clarify this patient nutritional status as    When answering this query, please exercise your independent professional judgment. The fact that a question is being asked, does not imply that any particular answer is desired or expected.    The patient's clinical indicators include:  Clinical Information:  Pt. is a 69 y.o. female who presented with a right para/pretracheal mass on 25.    Clinical Indicators:  Nutrition Consult  ?Malnutrition Diagnosis  Patient has Malnutrition Diagnosis: Yes  Diagnosis Status: New  Malnutrition Diagnosis: Severe malnutrition related to chronic disease or condition  As Evidenced by: unintentional greater than 10 percent weight loss in 6 months, severe muscle wasting and fat loss on exam, BMI less than 15?    Treatment: nutrition consult; Nutrition prescription for oral nutrition    Risk Factors: paratracheal mass-dysphagia; extended poor appetite  Options provided:  -- I agree with dietician diagnosis of severe malnutrition on 25.  -- Other - I will add my own diagnosis  -- Refer to Clinical Documentation Reviewer    Query created by: Judy Rios on 2025 10:51 AM      Electronically signed by:  GUSTAVO HONYECUTT PA-C 2025 7:00 AM

## 2025-05-14 DIAGNOSIS — J98.59 MEDIASTINAL MASS: ICD-10-CM

## 2025-05-14 RX ORDER — HYDROMORPHONE HYDROCHLORIDE 2 MG/1
2 TABLET ORAL EVERY 6 HOURS PRN
Qty: 28 TABLET | Refills: 0 | Status: SHIPPED | OUTPATIENT
Start: 2025-05-14 | End: 2025-05-21

## 2025-05-14 NOTE — PROGRESS NOTES
Jaja Bradshaw is a 69 y.o. female with a history of a right para/pretracheal mass.  This was seen on prior CT scan, she underwent a cervical mediastinoscopy at which time we felt we got a biopsy of this mass, results of this and lymph node showed only granulomatous disease. However, this lesion continued to grow and patient has diffuse pain/myasthenias, weakness and remains concerning.   She is now status post robotic assisted paratracheal mass excision on 4/30/2025.      Chest tube removed 5/1.  Slowly resolving apical ptx after Chest tube removal.  Clinically stable.     At the time of discharge, her pain was controlled on an oral pain regimen, She was breathing comfortably on room air.  Shewas ambulating independently.  She was tolerating a regular diet without nausea. She was voiding regularly.        Calls today reporting persistent pain under her right breast described as a constant dull ache worse with pressure.  She admits to intermittent gurgling in the right chest as well.  She denies any productive cough, fevers, shortness of breath.  She reports generalized weakness and muscle aches.  Muscle aches are not new this was existing preoperatively.  She does also report worsening swallowing.  She reports pain with swallowing worse since her procedure.  She states that she has had regurgitation of food if she takes too big of bites.  She has had loss of appetite.    Rx for dilaudid sent to pharmacy of record confirmed with patient  Esophagram for dysphagia  CXR   Post op 5/20  Discussed indications to go to the ER  She is  agreeable     Heydi Marcum PA-C  Department of Thoracic and Esophageal Surgery  [facility]  [phone]

## 2025-05-15 DIAGNOSIS — R13.10 DYSPHAGIA, UNSPECIFIED TYPE: ICD-10-CM

## 2025-05-16 ENCOUNTER — TELEPHONE (OUTPATIENT)
Dept: CARDIOTHORACIC SURGERY | Facility: HOSPITAL | Age: 70
End: 2025-05-16
Payer: MEDICARE

## 2025-05-16 DIAGNOSIS — C80.1 SMALL CELL CARCINOMA: Primary | ICD-10-CM

## 2025-05-16 LAB
LAB AP ASR DISCLAIMER: NORMAL
LABORATORY COMMENT REPORT: NORMAL
PATH REPORT.FINAL DX SPEC: NORMAL
PATH REPORT.GROSS SPEC: NORMAL
PATH REPORT.RELEVANT HX SPEC: NORMAL
PATH REPORT.TOTAL CANCER: NORMAL

## 2025-05-16 NOTE — TELEPHONE ENCOUNTER
"Result Communication    Resulted Orders   Surgical Pathology Exam   Result Value Ref Range    Case Report       Surgical Pathology                                Case: [id number]                                  Authorizing Provider:  Vickie Gonzalez DO     Collected:           04/30/2025 0917              Ordering Location:     [facility]       Received:            04/30/2025 1130                                     [facility] OR                                                             Pathologist:           Lisseth Griffith DO                                                          Specimens:   A) - LYMPH NODE PULMONARY (SPECIFY SITE), SUPERIOR VENA CAVA LYMPH NODE                             B) - LYMPH NODE PULMONARY (SPECIFY SITE), LEVEL 10 LYMPH NODE                                       C) - LYMPH NODE PULMONARY (SPECIFY SITE), ELANA-AORTIC LYMPH NODE                                    D) - SOFT TISSUE MASS RESECTION, PARATRACHEAL MASS                                         FINAL DIAGNOSIS         A. LYMPH NODE, SUPERIOR VENA CAVA, EXCISION:  - One lymph node with no evidence of carcinoma (0/1).    B. LYMPH NODE, LEVEL 10, EXCISION:  - Focal small cell carcinoma, involving lymph node tissue.     C. LYMPH NODE, ELANA-AORTIC, EXCISION:  - One lymph node with no evidence of carcinoma (0/1).    D. SOFT TISSUE, DESIGNATED \"PARATRACHEAL MASS\", EXCISION:  - Small cell carcinoma. See note.  - Tumor extends focally to disrupted inked parenchymal edge.     Note: H&E sections of this mass demonstrate a well-circumscribed nodule with a rim of lymphoid tissue and what appears to be lymph node capsule (D2).  Within the nodule there is a population of small round blue cell tumors with the morphology of the small cell carcinoma intermixed with granulomatous inflammation.  By immunohistochemical staining, the tumor cells are positive for CAM 5.2, TTF 1 (SPT24) (weak), INSM1, and chromogranin.  Ki-67 " "proliferation index is high, approximately 70%.  Retinoblastoma expression is globally lost within the tumor cells.  Tumor cells are negative for AE1/AE3, CD20, CD3, p40, S100, and CD1a.  CD68 highlights intranodal histiocytes.  By morphology and immunohistochemical staining, the findings are favored to represent metastatic small cell carcinoma in enlarged lymph node. Clinical correlation in regards to the primary is recommended. AFB and GMS will be ordered and reported in an addendum.    Of note: The previous lymph node biopsy slides ([id number]) were reviewed, and show no evidence of small cell carcinoma.    Staff consultants: Nirali Contreras, Raghu Siegel.              By the signature on this report, the individual or group listed as making the Final Interpretation/Diagnosis certifies that they have reviewed this case.       Clinical History       Pre-op diagnosis:  Mediastinal mass [J98.59]      Gross Description       A: Received in formalin, labeled with the patient's name and hospital number and \"superior vena cava *\", is a segment of adipose tissue with possible scant grey-black lymph node tissue measuring 0.8 x 0.5 x 0.1 cm.  The specimen is submitted in toto in one cassette.  JLL  B: Received in formalin, labeled with the patient's name and hospital number and \"level 10 lymph node\", are 3 grey-black lymph node fragments aggregating to 2.0 x 0.8 x 0.3 cm.  The specimen is submitted in toto in one cassette.  JLL  C: Received in formalin, labeled with the patient's name and hospital number and \"abdulkadir-aortic lymph node\", is a grey-black lymph node measuring 0.6 x 0.5 x 0.3 cm.  The specimen is bisected and submitted entirely in one cassette.  JLL  D: Received in formalin, labeled with the patient's name and hospital number and \"paratracheal mass\", is an encapsulated soft tissue mass measuring 5.3 x 3.5 x 3.3 cm and weighing 36.2 grams.  A defect is present and is inked orange.  The specimen is inked blue and " sectioned to reveal pale tan to pink and focally chalky cut surfaces.  Representative sections are submitted in 5 cassettes.  JLL      Disclaimer       One or more of the reagents used to perform assays on this specimen MAY have contained components considered to be analyte specific reagents (ASR's).  ASR's have not been cleared or approved by the U.S. Food and Drug Administration.  These assays were developed and their performance characteristics determined by the Department of Pathology at [facility]. The FDA does not require this test to go through premarket FDA review. This test is used for clinical purposes. It should not be regarded as investigational or for research. This laboratory is certified under the Clinical Laboratory Improvement Amendments (CLIA) as qualified to perform high complexity clinical laboratory testing.  The assays were performed with appropriate positive and negative controls which stained appropriately.         3:30 PM      Results were successfully communicated with the patient and they acknowledged their understanding. Will refer to Medical Oncology, obtain new PET and get MRI brain    Vickie Gonzalez, DO  Thoracic & Esophageal Surgery

## 2025-05-19 NOTE — DOCUMENTATION CLARIFICATION NOTE
"    PATIENT:               RENZO EDWARDS  ACCT #:                  [id number]  MRN:                       [id number]  :                       1955  ADMIT DATE:       2025 5:42 AM  DISCH DATE:        2025 2:49 PM  RESPONDING PROVIDER #:        [id number]          PROVIDER RESPONSE TEXT:    I concur with the 25 pathology report findings, and they are clinically significant    CDI QUERY TEXT:    Clarification    Instruction:    Based on your assessment of the patient and the clinical information, please provide the requested documentation by clicking on the appropriate radio button and enter any additional information if prompted.    Question: Please document whether you concur or do not concur with the pathology report findings    When answering this query, please exercise your independent professional judgment. The fact that a question is being asked, does not imply that any particular answer is desired or expected.    The patient's clinical indicators include:  Clinical Information: Pt. is a s an 69 y.o. female who presented with a paratracheal/mediastinal mass.    Clinical Indicators and Pathology Findings: Patient is s/p mass excision on 25. Pathology findings 25 \"D. soft tissue, designated \"paratracheal mass\", excision:  - Small cell carcinoma.  - Tumor extends focally to disrupted inked parenchymal edge.  B. LYMPH NODE, LEVEL 10, EXCISION:  - Focal small cell carcinoma, involving lymph node tissue.\"  Options provided:  -- I concur with the 25 pathology report findings, and they are clinically significant  -- Other - I will add my own diagnosis  -- Refer to Clinical Documentation Reviewer    Query created by: Judy Rios on 2025 9:04 AM      Electronically signed by:  ROMA VIRK DO 2025 3:31 PM          "

## 2025-05-19 NOTE — PROGRESS NOTES
"Patient ID: Jaja Bradshaw is a 69 y.o. female    Primary Care Provider: No primary care provider on file.    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 Jaja Bradshaw is a 70 YO with PMH of HTN, hypothyroidism, chronic LE pain seen as initial consultation for [id number] small cell carcinoma of mediastinum.    Patient with ~5 months of progressive, debilitating leg pain and unintentional weight loss. As part of workup a noncon CT was obtained 01/10/2025 demonstrating soft tissue mass in R paratracheal measuring 3 cm. Patient underwent cervical mediastinoscopy 01/30/2025 with biopsy showing lymphoid tissue with rare nonnecrotizing granulomas. Workup continued, she was seen by rheum and evaluated for sarcoidosis. Rheum workup also showed normal ESR/CRP, CCP/RF.     She underwent paratracheal mass excision on 04/30/2025 with pathology demonstrating:    FINAL DIAGNOSIS      A. LYMPH NODE, SUPERIOR VENA CAVA, EXCISION:  - One lymph node with no evidence of carcinoma (0/1).     B. LYMPH NODE, LEVEL 10, EXCISION:  - Focal small cell carcinoma, involving lymph node tissue.      C. LYMPH NODE, ELANA-AORTIC, EXCISION:  - One lymph node with no evidence of carcinoma (0/1).     D. SOFT TISSUE, DESIGNATED \"PARATRACHEAL MASS\", EXCISION:  - Small cell carcinoma. See note.  - Tumor extends focally to disrupted inked parenchymal edge.      Note: H&E sections of this mass demonstrate a well-circumscribed nodule with a rim of lymphoid tissue and what appears to be lymph node capsule (D2).  Within the nodule there is a population of small " round blue cell tumors with the morphology of the small cell carcinoma intermixed with granulomatous inflammation.  By immunohistochemical staining, the tumor cells are positive for CAM 5.2, TTF 1 (SPT24) (weak), INSM1, and chromogranin.  Ki-67 proliferation index is high, approximately 70%.  Retinoblastoma expression is globally lost within the tumor cells.  Tumor cells are negative for AE1/AE3, CD20, CD3, p40, S100, and CD1a.  CD68 highlights intranodal histiocytes.  By morphology and immunohistochemical staining, the findings are favored to represent metastatic small cell carcinoma in enlarged lymph node. Clinical correlation in regards to the primary is recommended. AFB and GMS will be ordered and reported in an addendum.     Of note: The previous lymph node biopsy slides ([id number]) were reviewed, and show no evidence of small cell carcinoma.     She presents for new patient consultation.    PAST MEDICAL HISTORY  HTN, hypothyroidism, chronic LE pain    SURGICAL HISTORY  Tubal ligation c/b hemorrhage requiring 6u blood, paratracheal mass excision     SOCIAL HISTORY  Lives in [place] with  Gurjit of 30 years. Lives with youngest daughter Sandra and 14 year old granddaughter Enid. Retired  in [place]. Heavy fiberglass exposure. Smoked 1 PPD x 50 years. 1-2 beers a day. Enjoys justino.     FAMILY HISTORY  Mother with liver cancer metastatic to lung    CURRENT MEDS REVIEWED       ALLERGIES REVIEWED        SUBJECTIVE: Patient doing ok today. She is continuing to lose weight and her dentures no longer fit. She is ambulatory around the house and spends time with her granddaughter. No cough, no hemoptysis, no chest pain.    A 13 point review of systems was performed, with significant findings documented above in subjective history.    OBJECTIVE:  Vitals:    05/21/25 1351   BP: 153/89   Pulse: 96   Resp: 18   Temp: 36.1 °C (97 °F)   SpO2: 97%      Body surface area is 1.3 meters squared.     Wt  Readings from Last 5 Encounters:   05/21/25 (!) 37.2 kg (82 lb)   05/20/25 (!) 37.6 kg (83 lb)   04/30/25 (!) 39 kg (85 lb 15.7 oz)   04/15/25 (!) 39 kg (86 lb)   03/21/25 (!) 38.9 kg (85 lb 12.8 oz)       ECOGSCORE: 2- Ambulatory and  capable of all selfcare; unable to carry out work activities.  Up and about > 50% of waking hrs.  Gen: A&O, NAD, cachexic  Head: Normocephalic, atraumatic  Eyes: no scleral icterus  ENT: mucous membranes moist, no oropharyngeal lesions  Resp: Lungs CTAB  Cardiac: Normal rate, regular rhythm, no murmurs appreciated  Abdomen: scaphoid abdomen  Neuro: CNII-XII grossly intact  Psych: appropriate mood & affect  Skin: warm, dry, no apparent rashes      Diagnostic Results   Results:  Labs:  Lab Results   Component Value Date    WBC 9.8 05/02/2025    HGB 10.5 (L) 05/02/2025    HCT 32.3 (L) 05/02/2025     (H) 05/02/2025     05/02/2025      Lab Results   Component Value Date    NEUTROABS 4.10 12/02/2024        Lab Results   Component Value Date    GLUCOSE 90 05/02/2025    CALCIUM 9.7 05/02/2025     05/02/2025    K 3.7 05/02/2025    CO2 29 05/02/2025     05/02/2025    BUN 8 05/02/2025    CREATININE 0.40 (L) 05/02/2025    MG 1.74 05/02/2025     Lab Results   Component Value Date    ALT 21 12/02/2024    AST 39 12/02/2024    ALKPHOS 87 12/02/2024    BILITOT 0.9 12/02/2024      Lab Results   Component Value Date    TSH 0.06 (L) 03/19/2025    FREET4 1.9 (H) 03/19/2025     PET 05/20/2025  IMPRESSION:  Postsurgical changes from prior right paratracheal mass resection  without evidence of hypermetabolic activity within the surgical bed.  Moderate hypermetabolic lymph nodes in the subcarinal region with a  SUVs of up to 4.7, likely representing lymph node metastases. No  evidence of hypermetabolic distant metastases. Esophagus is patulous  and fluid-filled throughout its course, high-risk for aspiration.  Extensive subcutaneous emphysema in the right chest wall extending to  the  abdominal wall and proximal right upper extremity.    CT Chest 04/15/2025  IMPRESSION:  1.  Interval increase in size of a soft tissue mass within the right  paratracheal lymph node station measuring 3.7 x 3.3 x 5.7 cm,  previously measuring 3.2 x 3.2 x 5.0 cm on exam dated 01/10/2025. No  new sites of metastatic disease within the chest.  2. Stable size of a 0.5 cm right upper lobe pulmonary nodule. No new  or enlarging discrete suspicious pulmonary nodules.  3. Moderate emphysema.  4. Severe coronary artery calcifications.      Assessment/Plan     No matching staging information was found for the patient.  Ms. Jaja Bradshaw is a 70 YO with PMH of HTN, hypothyroidism, chronic LE pain seen as initial consultation for [id number] small cell carcinoma of mediastinum.    Patient with ~5 months of progressive, debilitating leg pain and unintentional weight loss. As part of workup a noncon CT was obtained 01/10/2025 demonstrating soft tissue mass in R paratracheal measuring 3 cm. Patient underwent cervical mediastinoscopy 01/30/2025 with biopsy showing lymphoid tissue with rare nonnecrotizing granulomas. Workup continued, she was seen by rheum and evaluated for sarcoidosis. Rheum workup also showed normal ESR/CRP, CCP/RF.     Regarding her cancer: she still has active disease in subcarinal region after resection. I believe she will need 4 cycles of carboplatin/etoposide. If MRI brain is without disease I will refer to rad onc for sequential vs. Concurrent RT. Plan to consolidate with durvalumab per ADRIATIC study    Regarding her weight loss, muscle wasting and myalgias: I am worried about undiagnosed paraneoplastic syndrome (cachectic amyotrophy?). She currently has neurology consultation in November, I will work to expedite this.    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 Myopathy requested per neurology   -New PCP requested. Unable to see NP PCP given her insurance    Bhavesh Johansen MD  Thoracic Medical Oncology   [address] [place] [address]  Phone: [phone]

## 2025-05-20 ENCOUNTER — OFFICE VISIT (OUTPATIENT)
Dept: SURGERY | Facility: CLINIC | Age: 70
End: 2025-05-20
Payer: MEDICARE

## 2025-05-20 ENCOUNTER — APPOINTMENT (OUTPATIENT)
Dept: SURGERY | Facility: CLINIC | Age: 70
End: 2025-05-20
Payer: MEDICARE

## 2025-05-20 ENCOUNTER — HOSPITAL ENCOUNTER (OUTPATIENT)
Dept: RADIOLOGY | Facility: CLINIC | Age: 70
Discharge: HOME | End: 2025-05-20
Payer: MEDICARE

## 2025-05-20 VITALS
OXYGEN SATURATION: 94 % | HEART RATE: 68 BPM | DIASTOLIC BLOOD PRESSURE: 88 MMHG | BODY MASS INDEX: 14.17 KG/M2 | SYSTOLIC BLOOD PRESSURE: 154 MMHG | HEIGHT: 64 IN | WEIGHT: 83 LBS | TEMPERATURE: 97.3 F

## 2025-05-20 DIAGNOSIS — C80.1 SMALL CELL CARCINOMA: ICD-10-CM

## 2025-05-20 DIAGNOSIS — J39.8 TRACHEAL MASS: ICD-10-CM

## 2025-05-20 DIAGNOSIS — C77.9 METASTATIC SMALL CELL CARCINOMA TO LYMPH NODE: Primary | ICD-10-CM

## 2025-05-20 DIAGNOSIS — Z00.00 HEALTHCARE MAINTENANCE: ICD-10-CM

## 2025-05-20 PROCEDURE — 1159F MED LIST DOCD IN RCRD: CPT | Performed by: STUDENT IN AN ORGANIZED HEALTH CARE EDUCATION/TRAINING PROGRAM

## 2025-05-20 PROCEDURE — 3008F BODY MASS INDEX DOCD: CPT | Performed by: STUDENT IN AN ORGANIZED HEALTH CARE EDUCATION/TRAINING PROGRAM

## 2025-05-20 PROCEDURE — A9552 F18 FDG: HCPCS | Performed by: STUDENT IN AN ORGANIZED HEALTH CARE EDUCATION/TRAINING PROGRAM

## 2025-05-20 PROCEDURE — 1111F DSCHRG MED/CURRENT MED MERGE: CPT | Performed by: STUDENT IN AN ORGANIZED HEALTH CARE EDUCATION/TRAINING PROGRAM

## 2025-05-20 PROCEDURE — 78815 PET IMAGE W/CT SKULL-THIGH: CPT | Mod: PS

## 2025-05-20 PROCEDURE — 78815 PET IMAGE W/CT SKULL-THIGH: CPT | Mod: PET TUMOR SUBSQ TX STRATEGY | Performed by: INTERNAL MEDICINE

## 2025-05-20 PROCEDURE — 3079F DIAST BP 80-89 MM HG: CPT | Performed by: STUDENT IN AN ORGANIZED HEALTH CARE EDUCATION/TRAINING PROGRAM

## 2025-05-20 PROCEDURE — 3077F SYST BP >= 140 MM HG: CPT | Performed by: STUDENT IN AN ORGANIZED HEALTH CARE EDUCATION/TRAINING PROGRAM

## 2025-05-20 PROCEDURE — 3430000001 HC RX 343 DIAGNOSTIC RADIOPHARMACEUTICALS: Performed by: STUDENT IN AN ORGANIZED HEALTH CARE EDUCATION/TRAINING PROGRAM

## 2025-05-20 PROCEDURE — 71046 X-RAY EXAM CHEST 2 VIEWS: CPT

## 2025-05-20 PROCEDURE — 99211 OFF/OP EST MAY X REQ PHY/QHP: CPT | Performed by: STUDENT IN AN ORGANIZED HEALTH CARE EDUCATION/TRAINING PROGRAM

## 2025-05-20 RX ORDER — FLUDEOXYGLUCOSE F 18 200 MCI/ML
10.71 INJECTION, SOLUTION INTRAVENOUS
Status: COMPLETED | OUTPATIENT
Start: 2025-05-20 | End: 2025-05-20

## 2025-05-20 RX ADMIN — FLUDEOXYGLUCOSE F 18 10.71 MILLICURIE: 200 INJECTION, SOLUTION INTRAVENOUS at 12:45

## 2025-05-20 ASSESSMENT — PAIN SCALES - GENERAL: PAINLEVEL_OUTOF10: INELIGIBLE

## 2025-05-21 ENCOUNTER — SOCIAL WORK (OUTPATIENT)
Dept: CASE MANAGEMENT | Facility: HOSPITAL | Age: 70
End: 2025-05-21
Payer: MEDICARE

## 2025-05-21 ENCOUNTER — OFFICE VISIT (OUTPATIENT)
Dept: HEMATOLOGY/ONCOLOGY | Facility: CLINIC | Age: 70
End: 2025-05-21
Payer: MEDICARE

## 2025-05-21 VITALS
HEART RATE: 96 BPM | WEIGHT: 82 LBS | HEIGHT: 65 IN | OXYGEN SATURATION: 97 % | RESPIRATION RATE: 18 BRPM | BODY MASS INDEX: 13.66 KG/M2 | TEMPERATURE: 97 F | SYSTOLIC BLOOD PRESSURE: 153 MMHG | DIASTOLIC BLOOD PRESSURE: 89 MMHG

## 2025-05-21 DIAGNOSIS — G89.4 CHRONIC PAIN SYNDROME: ICD-10-CM

## 2025-05-21 DIAGNOSIS — C77.9 SMALL CELL CARCINOMA OF LYMPH NODE: ICD-10-CM

## 2025-05-21 DIAGNOSIS — C80.1 SMALL CELL CARCINOMA: Primary | ICD-10-CM

## 2025-05-21 DIAGNOSIS — J98.59 MEDIASTINAL MASS: ICD-10-CM

## 2025-05-21 DIAGNOSIS — C77.9 METASTATIC SMALL CELL CARCINOMA TO LYMPH NODE: ICD-10-CM

## 2025-05-21 PROCEDURE — 1111F DSCHRG MED/CURRENT MED MERGE: CPT | Performed by: STUDENT IN AN ORGANIZED HEALTH CARE EDUCATION/TRAINING PROGRAM

## 2025-05-21 PROCEDURE — 1159F MED LIST DOCD IN RCRD: CPT | Performed by: STUDENT IN AN ORGANIZED HEALTH CARE EDUCATION/TRAINING PROGRAM

## 2025-05-21 PROCEDURE — 3079F DIAST BP 80-89 MM HG: CPT | Performed by: STUDENT IN AN ORGANIZED HEALTH CARE EDUCATION/TRAINING PROGRAM

## 2025-05-21 PROCEDURE — 1125F AMNT PAIN NOTED PAIN PRSNT: CPT | Performed by: STUDENT IN AN ORGANIZED HEALTH CARE EDUCATION/TRAINING PROGRAM

## 2025-05-21 PROCEDURE — 99215 OFFICE O/P EST HI 40 MIN: CPT | Performed by: STUDENT IN AN ORGANIZED HEALTH CARE EDUCATION/TRAINING PROGRAM

## 2025-05-21 PROCEDURE — G2211 COMPLEX E/M VISIT ADD ON: HCPCS | Performed by: STUDENT IN AN ORGANIZED HEALTH CARE EDUCATION/TRAINING PROGRAM

## 2025-05-21 PROCEDURE — 3077F SYST BP >= 140 MM HG: CPT | Performed by: STUDENT IN AN ORGANIZED HEALTH CARE EDUCATION/TRAINING PROGRAM

## 2025-05-21 PROCEDURE — 99205 OFFICE O/P NEW HI 60 MIN: CPT | Performed by: STUDENT IN AN ORGANIZED HEALTH CARE EDUCATION/TRAINING PROGRAM

## 2025-05-21 PROCEDURE — 3008F BODY MASS INDEX DOCD: CPT | Performed by: STUDENT IN AN ORGANIZED HEALTH CARE EDUCATION/TRAINING PROGRAM

## 2025-05-21 RX ORDER — ALBUTEROL SULFATE 0.83 MG/ML
3 SOLUTION RESPIRATORY (INHALATION) AS NEEDED
OUTPATIENT
Start: 2025-05-28

## 2025-05-21 RX ORDER — PROCHLORPERAZINE MALEATE 10 MG
10 TABLET ORAL EVERY 6 HOURS PRN
OUTPATIENT
Start: 2025-05-29

## 2025-05-21 RX ORDER — ONDANSETRON 8 MG/1
8 TABLET, FILM COATED ORAL EVERY 8 HOURS PRN
Qty: 30 TABLET | Refills: 5 | Status: SHIPPED | OUTPATIENT
Start: 2025-05-21

## 2025-05-21 RX ORDER — FAMOTIDINE 10 MG/ML
20 INJECTION, SOLUTION INTRAVENOUS ONCE AS NEEDED
OUTPATIENT
Start: 2025-05-29

## 2025-05-21 RX ORDER — OLANZAPINE 5 MG/1
5 TABLET, FILM COATED ORAL NIGHTLY
Qty: 4 TABLET | Refills: 5 | Status: SHIPPED | OUTPATIENT
Start: 2025-05-21

## 2025-05-21 RX ORDER — ALBUTEROL SULFATE 0.83 MG/ML
3 SOLUTION RESPIRATORY (INHALATION) AS NEEDED
OUTPATIENT
Start: 2025-05-29

## 2025-05-21 RX ORDER — PROCHLORPERAZINE MALEATE 10 MG
10 TABLET ORAL EVERY 6 HOURS PRN
OUTPATIENT
Start: 2025-05-28

## 2025-05-21 RX ORDER — FAMOTIDINE 10 MG/ML
20 INJECTION, SOLUTION INTRAVENOUS ONCE AS NEEDED
OUTPATIENT
Start: 2025-05-27

## 2025-05-21 RX ORDER — PROCHLORPERAZINE MALEATE 10 MG
10 TABLET ORAL EVERY 6 HOURS PRN
OUTPATIENT
Start: 2025-05-27

## 2025-05-21 RX ORDER — EPINEPHRINE 0.3 MG/.3ML
0.3 INJECTION SUBCUTANEOUS EVERY 5 MIN PRN
OUTPATIENT
Start: 2025-05-29

## 2025-05-21 RX ORDER — EPINEPHRINE 0.3 MG/.3ML
0.3 INJECTION SUBCUTANEOUS EVERY 5 MIN PRN
OUTPATIENT
Start: 2025-05-27

## 2025-05-21 RX ORDER — PROCHLORPERAZINE EDISYLATE 5 MG/ML
10 INJECTION INTRAMUSCULAR; INTRAVENOUS EVERY 6 HOURS PRN
OUTPATIENT
Start: 2025-05-28

## 2025-05-21 RX ORDER — PROCHLORPERAZINE EDISYLATE 5 MG/ML
10 INJECTION INTRAMUSCULAR; INTRAVENOUS EVERY 6 HOURS PRN
OUTPATIENT
Start: 2025-05-29

## 2025-05-21 RX ORDER — DIPHENHYDRAMINE HYDROCHLORIDE 50 MG/ML
50 INJECTION, SOLUTION INTRAMUSCULAR; INTRAVENOUS AS NEEDED
OUTPATIENT
Start: 2025-05-29

## 2025-05-21 RX ORDER — DIPHENHYDRAMINE HYDROCHLORIDE 50 MG/ML
50 INJECTION, SOLUTION INTRAMUSCULAR; INTRAVENOUS AS NEEDED
OUTPATIENT
Start: 2025-05-27

## 2025-05-21 RX ORDER — ALBUTEROL SULFATE 0.83 MG/ML
3 SOLUTION RESPIRATORY (INHALATION) AS NEEDED
OUTPATIENT
Start: 2025-05-27

## 2025-05-21 RX ORDER — PALONOSETRON 0.05 MG/ML
0.25 INJECTION, SOLUTION INTRAVENOUS ONCE
OUTPATIENT
Start: 2025-05-27

## 2025-05-21 RX ORDER — HYDROMORPHONE HYDROCHLORIDE 2 MG/1
2 TABLET ORAL EVERY 6 HOURS PRN
Qty: 28 TABLET | Refills: 0 | Status: SHIPPED | OUTPATIENT
Start: 2025-05-21 | End: 2025-05-28

## 2025-05-21 RX ORDER — PROCHLORPERAZINE MALEATE 10 MG
10 TABLET ORAL EVERY 6 HOURS PRN
Qty: 30 TABLET | Refills: 5 | Status: SHIPPED | OUTPATIENT
Start: 2025-05-21

## 2025-05-21 RX ORDER — EPINEPHRINE 0.3 MG/.3ML
0.3 INJECTION SUBCUTANEOUS EVERY 5 MIN PRN
OUTPATIENT
Start: 2025-05-28

## 2025-05-21 RX ORDER — PROCHLORPERAZINE EDISYLATE 5 MG/ML
10 INJECTION INTRAMUSCULAR; INTRAVENOUS EVERY 6 HOURS PRN
OUTPATIENT
Start: 2025-05-27

## 2025-05-21 RX ORDER — ONDANSETRON HYDROCHLORIDE 2 MG/ML
8 INJECTION, SOLUTION INTRAVENOUS ONCE
OUTPATIENT
Start: 2025-05-29

## 2025-05-21 RX ORDER — DIPHENHYDRAMINE HYDROCHLORIDE 50 MG/ML
50 INJECTION, SOLUTION INTRAMUSCULAR; INTRAVENOUS AS NEEDED
OUTPATIENT
Start: 2025-05-28

## 2025-05-21 RX ORDER — FAMOTIDINE 10 MG/ML
20 INJECTION, SOLUTION INTRAVENOUS ONCE AS NEEDED
OUTPATIENT
Start: 2025-05-28

## 2025-05-21 SDOH — SOCIAL STABILITY: SOCIAL INSECURITY: WITHIN THE LAST YEAR, HAVE YOU BEEN AFRAID OF YOUR PARTNER OR EX-PARTNER?: NO

## 2025-05-21 SDOH — HEALTH STABILITY: PHYSICAL HEALTH
HOW OFTEN DO YOU NEED TO HAVE SOMEONE HELP YOU WHEN YOU READ INSTRUCTIONS, PAMPHLETS, OR OTHER WRITTEN MATERIAL FROM YOUR DOCTOR OR PHARMACY?: NEVER

## 2025-05-21 SDOH — SOCIAL STABILITY: SOCIAL INSECURITY
WITHIN THE LAST YEAR, HAVE YOU BEEN RAPED OR FORCED TO HAVE ANY KIND OF SEXUAL ACTIVITY BY YOUR PARTNER OR EX-PARTNER?: NO

## 2025-05-21 SDOH — HEALTH STABILITY: MENTAL HEALTH
DO YOU FEEL STRESS - TENSE, RESTLESS, NERVOUS, OR ANXIOUS, OR UNABLE TO SLEEP AT NIGHT BECAUSE YOUR MIND IS TROUBLED ALL THE TIME - THESE DAYS?: ONLY A LITTLE

## 2025-05-21 SDOH — ECONOMIC STABILITY: HOUSING INSECURITY: IN THE LAST 12 MONTHS, WAS THERE A TIME WHEN YOU WERE NOT ABLE TO PAY THE MORTGAGE OR RENT ON TIME?: NO

## 2025-05-21 SDOH — SOCIAL STABILITY: SOCIAL NETWORK: HOW OFTEN DO YOU ATTEND MEETINGS OF THE CLUBS OR ORGANIZATIONS YOU BELONG TO?: NEVER

## 2025-05-21 SDOH — ECONOMIC STABILITY: FOOD INSECURITY: WITHIN THE PAST 12 MONTHS, THE FOOD YOU BOUGHT JUST DIDN'T LAST AND YOU DIDN'T HAVE MONEY TO GET MORE.: NEVER TRUE

## 2025-05-21 SDOH — SOCIAL STABILITY: SOCIAL NETWORK
DO YOU BELONG TO ANY CLUBS OR ORGANIZATIONS SUCH AS CHURCH GROUPS, UNIONS, FRATERNAL OR ATHLETIC GROUPS, OR SCHOOL GROUPS?: NO

## 2025-05-21 SDOH — SOCIAL STABILITY: SOCIAL NETWORK: HOW OFTEN DO YOU GET TOGETHER WITH FRIENDS OR RELATIVES?: NEVER

## 2025-05-21 SDOH — SOCIAL STABILITY: SOCIAL INSECURITY: WITHIN THE LAST YEAR, HAVE YOU BEEN HUMILIATED OR EMOTIONALLY ABUSED IN OTHER WAYS BY YOUR PARTNER OR EX-PARTNER?: NO

## 2025-05-21 SDOH — ECONOMIC STABILITY: FOOD INSECURITY: HOW HARD IS IT FOR YOU TO PAY FOR THE VERY BASICS LIKE FOOD, HOUSING, MEDICAL CARE, AND HEATING?: PATIENT DECLINED

## 2025-05-21 SDOH — SOCIAL STABILITY: SOCIAL INSECURITY
WITHIN THE LAST YEAR, HAVE YOU BEEN KICKED, HIT, SLAPPED, OR OTHERWISE PHYSICALLY HURT BY YOUR PARTNER OR EX-PARTNER?: NO

## 2025-05-21 SDOH — ECONOMIC STABILITY: FOOD INSECURITY: WITHIN THE PAST 12 MONTHS, YOU WORRIED THAT YOUR FOOD WOULD RUN OUT BEFORE YOU GOT THE MONEY TO BUY MORE.: NEVER TRUE

## 2025-05-21 SDOH — HEALTH STABILITY: MENTAL HEALTH: HOW MANY DRINKS CONTAINING ALCOHOL DO YOU HAVE ON A TYPICAL DAY WHEN YOU ARE DRINKING?: 1 OR 2

## 2025-05-21 SDOH — SOCIAL STABILITY: SOCIAL NETWORK
IN A TYPICAL WEEK, HOW MANY TIMES DO YOU TALK ON THE PHONE WITH FAMILY, FRIENDS, OR NEIGHBORS?: MORE THAN THREE TIMES A WEEK

## 2025-05-21 SDOH — HEALTH STABILITY: MENTAL HEALTH: HOW OFTEN DO YOU HAVE SIX OR MORE DRINKS ON ONE OCCASION?: NEVER

## 2025-05-21 SDOH — HEALTH STABILITY: MENTAL HEALTH: HOW OFTEN DO YOU HAVE A DRINK CONTAINING ALCOHOL?: 4 OR MORE TIMES A WEEK

## 2025-05-21 SDOH — ECONOMIC STABILITY: INCOME INSECURITY: IN THE PAST 12 MONTHS HAS THE ELECTRIC, GAS, OIL, OR WATER COMPANY THREATENED TO SHUT OFF SERVICES IN YOUR HOME?: NO

## 2025-05-21 SDOH — ECONOMIC STABILITY: HOUSING INSECURITY: AT ANY TIME IN THE PAST 12 MONTHS, WERE YOU HOMELESS OR LIVING IN A SHELTER (INCLUDING NOW)?: NO

## 2025-05-21 SDOH — SOCIAL STABILITY: SOCIAL INSECURITY: ARE YOU MARRIED, WIDOWED, DIVORCED, SEPARATED, NEVER MARRIED, OR LIVING WITH A PARTNER?: MARRIED

## 2025-05-21 SDOH — ECONOMIC STABILITY: HOUSING INSECURITY: IN THE PAST 12 MONTHS, HOW MANY TIMES HAVE YOU MOVED WHERE YOU WERE LIVING?: 1

## 2025-05-21 SDOH — SOCIAL STABILITY: SOCIAL NETWORK: HOW OFTEN DO YOU ATTEND CHURCH OR RELIGIOUS SERVICES?: NEVER

## 2025-05-21 SDOH — ECONOMIC STABILITY: TRANSPORTATION INSECURITY: IN THE PAST 12 MONTHS, HAS LACK OF TRANSPORTATION KEPT YOU FROM MEDICAL APPOINTMENTS OR FROM GETTING MEDICATIONS?: NO

## 2025-05-21 ASSESSMENT — LIFESTYLE VARIABLES
SKIP TO QUESTIONS 9-10: 1
AUDIT-C TOTAL SCORE: 4

## 2025-05-21 ASSESSMENT — ACTIVITIES OF DAILY LIVING (ADL): LACK_OF_TRANSPORTATION: NO

## 2025-05-21 ASSESSMENT — PAIN SCALES - GENERAL: PAINLEVEL_OUTOF10: 10-WORST PAIN EVER

## 2025-05-21 NOTE — PROGRESS NOTES
Disease specific binder given to patient. Red chemo bags contents reviewed with patient and spouse. Yellow fever card explained and patient placed in her purse.     Treatment specific PI reviewed with patient and spouse. All questions answered.     Will plan on day 1 treatment at [facility] with provider and days 2 & 3 at [facility] for patient convenience.     First cycle is scheduled at [facility] due to availability.   She will be getting labs next week at [facility].

## 2025-05-21 NOTE — PROGRESS NOTES
Patient is a 69 year old female with dx lung cancer. Patient had a new patient visit with Dr. Johansen today. KWASI sent patient a [employer] message with Social work introduction and provided contact information.     5/22/25: SW met with patient and spouse after her NPV with Dr. Johansen while at scheduling. Patient reported that with her insurance she is not able to see a CNP. SW offered to call her insurance [employer] to see if this is the case. Patient also seeking for a PCP. SW went to [email or web address] and found provider Jacquelin Aquino DO in [place] who is accepting new patients. Patient had sent SW a [employer] message asking if SW was able to find a PCP. KWASI sent her following information.  Dr. Jacquelin Aquino DO  [facility] [address] [place] [address] [address] [place] [phone]  Specialties   (2): Family Medicine, Internal Medicine   Hospitals   : [facility]  National Provider ID (NPI): [id number]. SW will call [employer] to determine if CNP is an option for oncology and will follow up with patient.     Update: KWASI called [employer] Provider services [phone] to verify NERIS Chapman CNP as a provider patient is able to see. KWASI verified her NPI and she is participating under patient's current plan and is allowed as they are follow Medicare guidelines. KWASI called patient today to make her aware. She also confirmed that Dr. Johansen found her a PCP in [facility] and she has an appt on 8/21/25. [facility] has been overwhelmed with all the phone calls and information. She gave her daughter information from the appt yesterday. Patient is also seeking information on what to eat. KWASI reviewed referral was made to [facility] at [place]-KWASI explained what is there and also appt with RD. KWASI will update RD to reach out to patient. Patient is not interested in meeting during treatment. Patient also seeking wig resources. KWASI will provide patient with information from [facility] at next visit. KWASI provided  support.    Update: Patient called back to  asking for the phone number for [facility]. KWASI called chin today to proivde her with the number to schedule [phone]. Chin also needs to schedule a EMG & nerve conduction KWASI provided her with the phone number in the order [phone]

## 2025-05-22 ENCOUNTER — TELEPHONE (OUTPATIENT)
Dept: HEMATOLOGY/ONCOLOGY | Facility: CLINIC | Age: 70
End: 2025-05-22
Payer: MEDICARE

## 2025-05-22 ENCOUNTER — TELEPHONE (OUTPATIENT)
Facility: CLINIC | Age: 70
End: 2025-05-22

## 2025-05-22 PROBLEM — C77.9: Status: ACTIVE | Noted: 2025-05-21

## 2025-05-22 RX ORDER — PROCHLORPERAZINE MALEATE 10 MG
10 TABLET ORAL EVERY 6 HOURS PRN
OUTPATIENT
Start: 2025-06-18

## 2025-05-22 RX ORDER — PALONOSETRON 0.05 MG/ML
0.25 INJECTION, SOLUTION INTRAVENOUS ONCE
OUTPATIENT
Start: 2025-06-17

## 2025-05-22 RX ORDER — ALBUTEROL SULFATE 0.83 MG/ML
3 SOLUTION RESPIRATORY (INHALATION) AS NEEDED
OUTPATIENT
Start: 2025-06-17

## 2025-05-22 RX ORDER — EPINEPHRINE 0.3 MG/.3ML
0.3 INJECTION SUBCUTANEOUS EVERY 5 MIN PRN
OUTPATIENT
Start: 2025-06-18

## 2025-05-22 RX ORDER — PROCHLORPERAZINE EDISYLATE 5 MG/ML
10 INJECTION INTRAMUSCULAR; INTRAVENOUS EVERY 6 HOURS PRN
OUTPATIENT
Start: 2025-06-17

## 2025-05-22 RX ORDER — DIPHENHYDRAMINE HYDROCHLORIDE 50 MG/ML
50 INJECTION, SOLUTION INTRAMUSCULAR; INTRAVENOUS AS NEEDED
OUTPATIENT
Start: 2025-06-18

## 2025-05-22 RX ORDER — ALBUTEROL SULFATE 0.83 MG/ML
3 SOLUTION RESPIRATORY (INHALATION) AS NEEDED
OUTPATIENT
Start: 2025-06-18

## 2025-05-22 RX ORDER — FAMOTIDINE 10 MG/ML
20 INJECTION, SOLUTION INTRAVENOUS ONCE AS NEEDED
OUTPATIENT
Start: 2025-06-18

## 2025-05-22 RX ORDER — HEPARIN SODIUM,PORCINE/PF 10 UNIT/ML
50 SYRINGE (ML) INTRAVENOUS AS NEEDED
OUTPATIENT
Start: 2025-05-22

## 2025-05-22 RX ORDER — FAMOTIDINE 10 MG/ML
20 INJECTION, SOLUTION INTRAVENOUS ONCE AS NEEDED
OUTPATIENT
Start: 2025-06-19

## 2025-05-22 RX ORDER — FAMOTIDINE 10 MG/ML
20 INJECTION, SOLUTION INTRAVENOUS ONCE AS NEEDED
OUTPATIENT
Start: 2025-06-17

## 2025-05-22 RX ORDER — DIPHENHYDRAMINE HYDROCHLORIDE 50 MG/ML
50 INJECTION, SOLUTION INTRAMUSCULAR; INTRAVENOUS AS NEEDED
OUTPATIENT
Start: 2025-06-17

## 2025-05-22 RX ORDER — PROCHLORPERAZINE EDISYLATE 5 MG/ML
10 INJECTION INTRAMUSCULAR; INTRAVENOUS EVERY 6 HOURS PRN
OUTPATIENT
Start: 2025-06-18

## 2025-05-22 RX ORDER — EPINEPHRINE 0.3 MG/.3ML
0.3 INJECTION SUBCUTANEOUS EVERY 5 MIN PRN
OUTPATIENT
Start: 2025-06-19

## 2025-05-22 RX ORDER — ALBUTEROL SULFATE 0.83 MG/ML
3 SOLUTION RESPIRATORY (INHALATION) AS NEEDED
OUTPATIENT
Start: 2025-06-19

## 2025-05-22 RX ORDER — PROCHLORPERAZINE MALEATE 10 MG
10 TABLET ORAL EVERY 6 HOURS PRN
OUTPATIENT
Start: 2025-06-17

## 2025-05-22 RX ORDER — PROCHLORPERAZINE EDISYLATE 5 MG/ML
10 INJECTION INTRAMUSCULAR; INTRAVENOUS EVERY 6 HOURS PRN
OUTPATIENT
Start: 2025-06-19

## 2025-05-22 RX ORDER — HEPARIN 100 UNIT/ML
500 SYRINGE INTRAVENOUS AS NEEDED
OUTPATIENT
Start: 2025-05-22

## 2025-05-22 RX ORDER — DIPHENHYDRAMINE HYDROCHLORIDE 50 MG/ML
50 INJECTION, SOLUTION INTRAMUSCULAR; INTRAVENOUS AS NEEDED
OUTPATIENT
Start: 2025-06-19

## 2025-05-22 RX ORDER — EPINEPHRINE 0.3 MG/.3ML
0.3 INJECTION SUBCUTANEOUS EVERY 5 MIN PRN
OUTPATIENT
Start: 2025-06-17

## 2025-05-22 RX ORDER — PROCHLORPERAZINE MALEATE 10 MG
10 TABLET ORAL EVERY 6 HOURS PRN
OUTPATIENT
Start: 2025-06-19

## 2025-05-22 RX ORDER — ONDANSETRON HYDROCHLORIDE 2 MG/ML
8 INJECTION, SOLUTION INTRAVENOUS ONCE
OUTPATIENT
Start: 2025-06-19

## 2025-05-22 NOTE — TELEPHONE ENCOUNTER
Called patient to discuss referral to Dr. Stacy and the order placed for an EMG.     She is agreeable. Per Dr. Stacy, his team will reach out to patient to schedule testing and appointment.     She also noted that she was able to get scheduled with a PCP in August. Dr. Johansen aware.

## 2025-05-27 ENCOUNTER — APPOINTMENT (OUTPATIENT)
Dept: RHEUMATOLOGY | Facility: CLINIC | Age: 70
End: 2025-05-27
Payer: MEDICARE

## 2025-05-27 LAB
LAB AP ASR DISCLAIMER: NORMAL
LABORATORY COMMENT REPORT: NORMAL
PATH REPORT.ADDENDUM SPEC: NORMAL
PATH REPORT.FINAL DX SPEC: NORMAL
PATH REPORT.GROSS SPEC: NORMAL
PATH REPORT.RELEVANT HX SPEC: NORMAL
PATH REPORT.TOTAL CANCER: NORMAL

## 2025-05-27 PROCEDURE — 88312 SPECIAL STAINS GROUP 1: CPT | Performed by: STUDENT IN AN ORGANIZED HEALTH CARE EDUCATION/TRAINING PROGRAM

## 2025-05-28 PROBLEM — R00.2 PALPITATIONS: Status: ACTIVE | Noted: 2025-05-28

## 2025-05-28 PROBLEM — Z12.31 SCREENING MAMMOGRAM FOR BREAST CANCER: Status: RESOLVED | Noted: 2024-12-02 | Resolved: 2025-05-28

## 2025-05-28 PROBLEM — I10 HYPERTENSION: Status: ACTIVE | Noted: 2025-05-28

## 2025-05-28 PROBLEM — E78.5 HYPERLIPIDEMIA: Status: ACTIVE | Noted: 2025-05-28

## 2025-05-28 PROBLEM — R94.31 ABNORMAL ECG: Status: ACTIVE | Noted: 2025-05-28

## 2025-05-28 PROBLEM — Z12.11 COLON CANCER SCREENING: Status: RESOLVED | Noted: 2024-12-02 | Resolved: 2025-05-28

## 2025-05-28 LAB
ATRIAL RATE: 95 BPM
P AXIS: 81 DEGREES
P OFFSET: 218 MS
P ONSET: 165 MS
PR INTERVAL: 128 MS
Q ONSET: 229 MS
QRS COUNT: 16 BEATS
QRS DURATION: 62 MS
QT INTERVAL: 356 MS
QTC CALCULATION(BAZETT): 447 MS
QTC FREDERICIA: 414 MS
R AXIS: -61 DEGREES
T AXIS: 84 DEGREES
T OFFSET: 407 MS
VENTRICULAR RATE: 95 BPM

## 2025-05-29 ENCOUNTER — HOSPITAL ENCOUNTER (OUTPATIENT)
Dept: RADIOLOGY | Facility: HOSPITAL | Age: 70
Discharge: HOME | End: 2025-05-29
Payer: MEDICARE

## 2025-05-29 DIAGNOSIS — C80.1 SMALL CELL CARCINOMA: ICD-10-CM

## 2025-05-29 PROCEDURE — A9575 INJ GADOTERATE MEGLUMI 0.1ML: HCPCS | Performed by: STUDENT IN AN ORGANIZED HEALTH CARE EDUCATION/TRAINING PROGRAM

## 2025-05-29 PROCEDURE — 70553 MRI BRAIN STEM W/O & W/DYE: CPT

## 2025-05-29 PROCEDURE — 2550000001 HC RX 255 CONTRASTS: Performed by: STUDENT IN AN ORGANIZED HEALTH CARE EDUCATION/TRAINING PROGRAM

## 2025-05-29 RX ORDER — GADOTERATE MEGLUMINE 376.9 MG/ML
8 INJECTION INTRAVENOUS
Status: COMPLETED | OUTPATIENT
Start: 2025-05-29 | End: 2025-05-29

## 2025-05-29 RX ADMIN — GADOTERATE MEGLUMINE 8 ML: 376.9 INJECTION INTRAVENOUS at 07:30

## 2025-05-29 NOTE — PROGRESS NOTES
Chief complaint:  Post-op    History Of Present Illness  Jaja Bradshaw is a 69 y.o. female who is here for her first post-op visit after undergoing a robotic right paratracheal mass resection on 4/30/25. Final pathology showed a small cell carcinoma with a now (+) level 7 LN. This was d/w her last week. She is seeing Oncology soon and has an MRI brain scheduled.    Pain along her incisions is improving. Her muscle weakness and pain continues to worsen. No fevers/chills.     By way of review patient was referred by her PCP, Fide Alonzo.Patient reports she had been having several generalized issues including diffuse muscle aches which started in her legs but have started to occur all over. She has also had ~30lb weight loss over the last 3-4 months. She does admit that she previously had dentures fitted which she cannot use secondary to discomfort and increased size. She also had previously lost her sense of taste.  Underwent a cervical mediastinoscopy for a paratracheal lesion, initial biopsies showed non-necrotizing granulomas only however ongoing growth of lesion prompted complete resection    No history of MI or CVA. Could walk a mile or climb 2 flights of stairs: yes     Past Medical History  She has a past medical history of COPD (chronic obstructive pulmonary disease) (Multi), Disease of thyroid gland, and PONV (postoperative nausea and vomiting).    Surgical History  She has a past surgical history that includes Other surgical history (04/17/2020) and Mediastinotomy (N/A, 01/30/2025).     Social History  She reports that she has been smoking cigarettes. She started smoking about 45 years ago. She has a 22.7 pack-year smoking history. She has been exposed to tobacco smoke. She has never used smokeless tobacco. She reports current alcohol use of about 8.0 standard drinks of alcohol per week. She reports that she does not use drugs.    Family History  Family history of cancer: Yes  Family History   Problem  Relation Name Age of Onset    Liver cancer Mother          Medications    Current Outpatient Medications:     acetaminophen (Tylenol) 500 mg tablet, Take 2 tablets (1,000 mg) by mouth every 8 hours if needed for mild pain (1 - 3) or moderate pain (4 - 6)., Disp: 30 tablet, Rfl: 0    aspirin 81 mg EC tablet, Take 1 tablet (81 mg) by mouth once daily., Disp: , Rfl:     cholecalciferol (Vitamin D3) 25 MCG (1000 UT) tablet, Take 2 tablets (2,000 Units) by mouth 5 times a day., Disp: , Rfl:     gabapentin (Neurontin) 100 mg capsule, Take 2 capsules (200 mg) by mouth 3 times a day., Disp: 180 capsule, Rfl: 5    ibuprofen 600 mg tablet, Take 1 tablet (600 mg) by mouth every 6 hours., Disp: 30 tablet, Rfl: 0    levothyroxine (Synthroid, Levoxyl) 88 mcg tablet, Take 1 tablet (88 mcg) by mouth early in the morning.. Take on an empty stomach at the same time each day, either 30 to 60 minutes prior to breakfast, Disp: 90 tablet, Rfl: 1    guaiFENesin (Mucinex) 600 mg 12 hr tablet, Take 1 tablet (600 mg) by mouth 2 times a day. Do not crush, chew, or split., Disp: 28 tablet, Rfl: 0    OLANZapine (ZyPREXA) 5 mg tablet, Take 1 tablet (5 mg) by mouth once daily at bedtime. For 4 days starting the evening of treatment., Disp: 4 tablet, Rfl: 5    ondansetron (Zofran) 8 mg tablet, Take 1 tablet (8 mg) by mouth every 8 hours if needed for nausea or vomiting., Disp: 30 tablet, Rfl: 5    prochlorperazine (Compazine) 10 mg tablet, Take 1 tablet (10 mg) by mouth every 6 hours if needed for nausea or vomiting., Disp: 30 tablet, Rfl: 5  No current facility-administered medications for this visit.    Allergies  Latex and Codeine    Review of Systems:  Review of Systems   Constitutional: No fevers, chills, unexpected weight change  HENT: No sore throat, congestion, or nasal drainage  Eyes: No visual changes or eye itching  Respiratory:  see HPI. No cough, worsening dyspnea, wheezing  Cardiac: No chest pain, palpitations, or lower extremity  "edema  Gastrointestinal: No nausea, vomiting, diarrhea. No abdominal pain  Genitourinary: No dysuria or hematuria  Musculoskeletal: No back pain. No significant myalgias or arthralgias  Neurologic: No headaches, dizziness, or seizures.  Hematologic: No easy bleeding or bruising.  Psychiatric: No anxiety or depression.    Physical Exam:  Physical Exam  /88   Pulse 68   Temp 36.3 °C (97.3 °F) (Temporal)   Ht 1.626 m (5' 4\")   Wt (!) 37.6 kg (83 lb)   SpO2 94%   BMI 14.25 kg/m²   Constitutional:       General: Patient is not in acute distress.     Appearance: Normal appearance; not ill-appearing.   HENT:      Head: Normocephalic.      Nose: No congestion or rhinorrhea.   Neck: Cervical mediastinoscopy site C/D/I  Cardiovascular:      Rate and Rhythm: Normal rate and regular rhythm.      Pulses: Normal pulses.   Pulmonary:      Effort: Pulmonary effort is normal. No respiratory distress.  No conversational dyspnea     Rt Robotic incisions are c/d/i     Breath sounds: No stridor. No wheezing.   Abdominal:      General: There is no distension.      Palpations: Abdomen is soft.      Tenderness: There is no abdominal tenderness.   Musculoskeletal:         General: No swelling, tenderness or deformity. Normal range of motion.      Cervical back: Normal range of motion. No rigidity.   Lymphadenopathy:      Cervical: No cervical adenopathy.   Skin:     General: Skin is warm and dry.   Neurological:      General: No focal deficit present.      Mental Status: Patient is alert and oriented to person, place, and time.   Psychiatric:         Mood and Affect: Mood normal.     Relevant Results    Pathology:Surgical Pathology Exam: [id number]  Order: [id number]   Collected 4/30/2025 09:17       Status: Edited Result - FINAL       Dx: Mediastinal mass    Test Result Released: Yes (not seen)    0 Result Notes       View Follow-Up Encounter      Component    FINAL DIAGNOSIS      A. LYMPH NODE, SUPERIOR VENA CAVA, EXCISION:  - " "One lymph node with no evidence of carcinoma (0/1).     B. LYMPH NODE, LEVEL 10, EXCISION:  - Focal small cell carcinoma, involving lymph node tissue.      C. LYMPH NODE, ELANA-AORTIC, EXCISION:  - One lymph node with no evidence of carcinoma (0/1).     D. SOFT TISSUE, DESIGNATED \"PARATRACHEAL MASS\", EXCISION:  - Small cell carcinoma. See note.  - Tumor extends focally to disrupted inked parenchymal edge.      Note: H&E sections of this mass demonstrate a well-circumscribed nodule with a rim of lymphoid tissue and what appears to be lymph node capsule (D2).  Within the nodule there is a population of small round blue cell tumors with the morphology of the small cell carcinoma intermixed with granulomatous inflammation.  By immunohistochemical staining, the tumor cells are positive for CAM 5.2, TTF 1 (SPT24) (weak), INSM1, and chromogranin.  Ki-67 proliferation index is high, approximately 70%.  Retinoblastoma expression is globally lost within the tumor cells.  Tumor cells are negative for AE1/AE3, CD20, CD3, p40, S100, and CD1a.  CD68 highlights intranodal histiocytes.  By morphology and immunohistochemical staining, the findings are favored to represent metastatic small cell carcinoma in enlarged lymph node. Clinical correlation in regards to the primary is recommended. AFB and GMS will be ordered and reported in an addendum.     Of note: The previous lymph node biopsy slides ([id number]) were reviewed, and show no evidence of small cell carcinoma.     Staff consultants: Nirali Contreras, Raghu Siegel.   Electronically signed by Lisseth Griffith DO on 5/16/2025 at 1321 EDT        By the signature on this report, the individual or group listed as making the Final Interpretation/Diagnosis certifies that they have reviewed this case.    Addendum   AFB and GMS, performed on block D3, are negative for acid-fast bacilli and fungal organisms, respectively.      Addendum electronically signed by Lisseth Griffith DO on " "5/27/2025 at 1514 EDT   Clinical History    Pre-op diagnosis:  Mediastinal mass [J98.59]   Gross Description    A: Received in formalin, labeled with the patient's name and hospital number and \"superior vena cava *\", is a segment of adipose tissue with possible scant grey-black lymph node tissue measuring 0.8 x 0.5 x 0.1 cm.  The specimen is submitted in toto in one cassette.  JLL  B: Received in formalin, labeled with the patient's name and hospital number and \"level 10 lymph node\", are 3 grey-black lymph node fragments aggregating to 2.0 x 0.8 x 0.3 cm.  The specimen is submitted in toto in one cassette.  JLL  C: Received in formalin, labeled with the patient's name and hospital number and \"abdulkadir-aortic lymph node\", is a grey-black lymph node measuring 0.6 x 0.5 x 0.3 cm.  The specimen is bisected and submitted entirely in one cassette.  JLL  D: Received in formalin, labeled with the patient's name and hospital number and \"paratracheal mass\", is an encapsulated soft tissue mass measuring 5.3 x 3.5 x 3.3 cm and weighing 36.2 grams.  A defect is present and is inked orange.  The specimen is inked blue and sectioned to reveal pale tan to pink and focally chalky cut surfaces.  Representative sections are submitted in 5 cassettes.  JL   Disclaimer    One or more of the reagents used to perform assays on this specimen MAY have contained components considered to be analyte specific reagents (ASR's).  ASR's have not been cleared or approved by the U.S. Food and Drug Administration.  These assays were developed and their performance characteristics determined by the Department of Pathology at [facility]. The FDA does not require this test to go through premarket FDA review. This test is used for clinical purposes. It should not be regarded as investigational or for research. This laboratory is certified under the Clinical Laboratory Improvement Amendments (CLIA) as qualified to perform " "high complexity clinical laboratory testing.  The assays were performed with appropriate positive and negative controls which stained appropriately.   Resulting Agency [facility]          Imaging:  Pulmonary Functions Testing Results:    No results found for: \"FEV1\", \"FVC\", \"LSH8JIF\", \"TLC\", \"DLCO\"    CXR personally reviewed     Assessment/Plan   Problem List Items Addressed This Visit           ICD-10-CM    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 - Primary C77.9     Other Visit Diagnoses         Codes      Healthcare maintenance     Z00.00            Ms. Bradshaw is a 69 year old female who presents for her first post-op visit after undergoing a robotic right paratracheal mass resection on 4/30/25. Final pathology showed a small cell carcinoma with a now (+) level 7 LN. She will see Oncology and complete brain MRI. I still wonder if her MSK/weakness are paraneoplastic. Will follow up on additional testing however primarily she will see Med Onc.       Vickie Gonzalez, DO  Thoracic & Esophageal Surgery     "

## 2025-06-02 ENCOUNTER — INFUSION (OUTPATIENT)
Dept: HEMATOLOGY/ONCOLOGY | Facility: CLINIC | Age: 70
End: 2025-06-02
Payer: MEDICARE

## 2025-06-02 ENCOUNTER — HOSPITAL ENCOUNTER (OUTPATIENT)
Dept: RADIOLOGY | Facility: HOSPITAL | Age: 70
Discharge: HOME | End: 2025-06-02
Payer: MEDICARE

## 2025-06-02 ENCOUNTER — DOCUMENTATION (OUTPATIENT)
Dept: HEMATOLOGY/ONCOLOGY | Facility: HOSPITAL | Age: 70
End: 2025-06-02

## 2025-06-02 VITALS
TEMPERATURE: 96.8 F | WEIGHT: 79.5 LBS | SYSTOLIC BLOOD PRESSURE: 121 MMHG | BODY MASS INDEX: 13.39 KG/M2 | HEART RATE: 134 BPM | RESPIRATION RATE: 18 BRPM | OXYGEN SATURATION: 98 % | DIASTOLIC BLOOD PRESSURE: 87 MMHG

## 2025-06-02 DIAGNOSIS — J98.59 MEDIASTINAL MASS: ICD-10-CM

## 2025-06-02 DIAGNOSIS — C77.9 METASTATIC SMALL CELL CARCINOMA TO LYMPH NODE: ICD-10-CM

## 2025-06-02 DIAGNOSIS — R13.19 ESOPHAGEAL DYSPHAGIA: ICD-10-CM
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
## 2025-06-02 DIAGNOSIS — C77.9 METASTATIC SMALL CELL CARCINOMA TO LYMPH NODE: Primary | ICD-10-CM

## 2025-06-02 DIAGNOSIS — T18.128A ESOPHAGEAL OBSTRUCTION DUE TO FOOD IMPACTION: Primary | ICD-10-CM

## 2025-06-02 LAB
ALBUMIN SERPL BCP-MCNC: 4.6 G/DL (ref 3.4–5)
ALP SERPL-CCNC: 51 U/L (ref 33–136)
ALT SERPL W P-5'-P-CCNC: 16 U/L (ref 7–45)
ANION GAP SERPL CALC-SCNC: 17 MMOL/L
AST SERPL W P-5'-P-CCNC: 23 U/L (ref 9–39)
BASOPHILS # BLD AUTO: 0.04 X10*3/UL (ref 0–0.1)
BASOPHILS NFR BLD AUTO: 0.5 %
BILIRUB SERPL-MCNC: 1.1 MG/DL (ref 0–1.2)
BUN SERPL-MCNC: 10 MG/DL (ref 6–23)
CALCIUM SERPL-MCNC: 10.4 MG/DL (ref 8.6–10.6)
CHLORIDE SERPL-SCNC: 99 MMOL/L (ref 98–107)
CO2 SERPL-SCNC: 28 MMOL/L (ref 21–32)
CREAT SERPL-MCNC: 0.51 MG/DL (ref 0.5–1.05)
DNA RANGE(S) EXAMINED NAR: NORMAL
EGFRCR SERPLBLD CKD-EPI 2021: >90 ML/MIN/1.73M*2
EOSINOPHIL # BLD AUTO: 0.03 X10*3/UL (ref 0–0.7)
EOSINOPHIL NFR BLD AUTO: 0.4 %
ERYTHROCYTE [DISTWIDTH] IN BLOOD BY AUTOMATED COUNT: 12.2 % (ref 11.5–14.5)
GENE DIS ANL INTERP-IMP: POSITIVE
GENE DIS ASSESSED: NORMAL
GENE MUT TESTED BLD/T: 15.8 M/MB
GLUCOSE SERPL-MCNC: 98 MG/DL (ref 74–99)
HBV CORE AB SER QL: NONREACTIVE
HBV SURFACE AB SER-ACNC: <3.1 MIU/ML
HBV SURFACE AG SERPL QL IA: NONREACTIVE
HCT VFR BLD AUTO: 38.1 % (ref 36–46)
HGB BLD-MCNC: 13.2 G/DL (ref 12–16)
IMM GRANULOCYTES # BLD AUTO: 0 X10*3/UL (ref 0–0.7)
IMM GRANULOCYTES NFR BLD AUTO: 0 % (ref 0–0.9)
LDH SERPL L TO P-CCNC: 166 U/L (ref 84–246)
LYMPHOCYTES # BLD AUTO: 1.75 X10*3/UL (ref 1.2–4.8)
LYMPHOCYTES NFR BLD AUTO: 20.6 %
MCH RBC QN AUTO: 33.8 PG (ref 26–34)
MCHC RBC AUTO-ENTMCNC: 34.6 G/DL (ref 32–36)
MCV RBC AUTO: 98 FL (ref 80–100)
MONOCYTES # BLD AUTO: 0.7 X10*3/UL (ref 0.1–1)
MONOCYTES NFR BLD AUTO: 8.2 %
MSI CA SPEC-IMP: NORMAL
NEUTROPHILS # BLD AUTO: 5.98 X10*3/UL (ref 1.2–7.7)
NEUTROPHILS NFR BLD AUTO: 70.3 %
NRBC BLD-RTO: ABNORMAL /100{WBCS}
PLATELET # BLD AUTO: 205 X10*3/UL (ref 150–450)
POTASSIUM SERPL-SCNC: 2.9 MMOL/L (ref 3.5–5.3)
PROT SERPL-MCNC: 7 G/DL (ref 6.4–8.2)
RBC # BLD AUTO: 3.9 X10*6/UL (ref 4–5.2)
REASON FOR STUDY: NORMAL
SODIUM SERPL-SCNC: 141 MMOL/L (ref 136–145)
TEMPUS GERMLINE NOTE: NORMAL
TEMPUS LCA: NORMAL
TEMPUS PORTAL: NORMAL
TEMPUS THERAPY1: NORMAL
TEMPUS THERAPYCOUNT: 1
TEMPUS TRIALCOUNT: 3
TEMPUS TRIALMATCHES1: NORMAL
TEMPUS TRIALMATCHES2: NORMAL
TEMPUS TRIALMATCHES3: NORMAL
TEMPUS XR RESULT 1: NORMAL
WBC # BLD AUTO: 8.5 X10*3/UL (ref 4.4–11.3)

## 2025-06-02 PROCEDURE — 87340 HEPATITIS B SURFACE AG IA: CPT

## 2025-06-02 PROCEDURE — 96413 CHEMO IV INFUSION 1 HR: CPT

## 2025-06-02 PROCEDURE — 2500000005 HC RX 250 GENERAL PHARMACY W/O HCPCS: Performed by: PHYSICIAN ASSISTANT

## 2025-06-02 PROCEDURE — 96375 TX/PRO/DX INJ NEW DRUG ADDON: CPT | Mod: INF

## 2025-06-02 PROCEDURE — 96367 TX/PROPH/DG ADDL SEQ IV INF: CPT

## 2025-06-02 PROCEDURE — 80053 COMPREHEN METABOLIC PANEL: CPT

## 2025-06-02 PROCEDURE — 74220 X-RAY XM ESOPHAGUS 1CNTRST: CPT

## 2025-06-02 PROCEDURE — 96417 CHEMO IV INFUS EACH ADDL SEQ: CPT

## 2025-06-02 PROCEDURE — 85025 COMPLETE CBC W/AUTO DIFF WBC: CPT

## 2025-06-02 PROCEDURE — 2500000004 HC RX 250 GENERAL PHARMACY W/ HCPCS (ALT 636 FOR OP/ED): Performed by: STUDENT IN AN ORGANIZED HEALTH CARE EDUCATION/TRAINING PROGRAM

## 2025-06-02 PROCEDURE — 83615 LACTATE (LD) (LDH) ENZYME: CPT

## 2025-06-02 PROCEDURE — 86706 HEP B SURFACE ANTIBODY: CPT

## 2025-06-02 PROCEDURE — 86704 HEP B CORE ANTIBODY TOTAL: CPT

## 2025-06-02 RX ORDER — PALONOSETRON 0.05 MG/ML
0.25 INJECTION, SOLUTION INTRAVENOUS ONCE
Status: COMPLETED | OUTPATIENT
Start: 2025-06-02 | End: 2025-06-02

## 2025-06-02 RX ORDER — DIPHENHYDRAMINE HYDROCHLORIDE 50 MG/ML
50 INJECTION, SOLUTION INTRAMUSCULAR; INTRAVENOUS AS NEEDED
Status: DISCONTINUED | OUTPATIENT
Start: 2025-06-02 | End: 2025-06-02 | Stop reason: HOSPADM

## 2025-06-02 RX ORDER — ALBUTEROL SULFATE 0.83 MG/ML
3 SOLUTION RESPIRATORY (INHALATION) AS NEEDED
Status: DISCONTINUED | OUTPATIENT
Start: 2025-06-02 | End: 2025-06-02 | Stop reason: HOSPADM

## 2025-06-02 RX ORDER — EPINEPHRINE 0.3 MG/.3ML
0.3 INJECTION SUBCUTANEOUS EVERY 5 MIN PRN
Status: DISCONTINUED | OUTPATIENT
Start: 2025-06-02 | End: 2025-06-02 | Stop reason: HOSPADM

## 2025-06-02 RX ORDER — PROCHLORPERAZINE MALEATE 10 MG
10 TABLET ORAL EVERY 6 HOURS PRN
Status: DISCONTINUED | OUTPATIENT
Start: 2025-06-02 | End: 2025-06-02 | Stop reason: HOSPADM

## 2025-06-02 RX ORDER — FAMOTIDINE 10 MG/ML
20 INJECTION, SOLUTION INTRAVENOUS ONCE AS NEEDED
Status: DISCONTINUED | OUTPATIENT
Start: 2025-06-02 | End: 2025-06-02 | Stop reason: HOSPADM

## 2025-06-02 RX ORDER — PROCHLORPERAZINE EDISYLATE 5 MG/ML
10 INJECTION INTRAMUSCULAR; INTRAVENOUS EVERY 6 HOURS PRN
Status: DISCONTINUED | OUTPATIENT
Start: 2025-06-02 | End: 2025-06-02 | Stop reason: HOSPADM

## 2025-06-02 RX ADMIN — BARIUM SULFATE 20 ML: 0.6 SUSPENSION ORAL at 09:16

## 2025-06-02 RX ADMIN — FOSAPREPITANT 150 MG: 150 INJECTION, POWDER, LYOPHILIZED, FOR SOLUTION INTRAVENOUS at 15:28

## 2025-06-02 RX ADMIN — SODIUM CHLORIDE 98 MG: 0.9 INJECTION, SOLUTION INTRAVENOUS at 16:46

## 2025-06-02 RX ADMIN — PALONOSETRON HYDROCHLORIDE 0.25 MG: 0.25 INJECTION INTRAVENOUS at 15:13

## 2025-06-02 RX ADMIN — CARBOPLATIN 280 MG: 10 INJECTION, SOLUTION INTRAVENOUS at 16:05

## 2025-06-02 RX ADMIN — DEXAMETHASONE SODIUM PHOSPHATE 12 MG: 10 INJECTION, SOLUTION INTRAMUSCULAR; INTRAVENOUS at 15:13

## 2025-06-02 ASSESSMENT — PAIN SCALES - GENERAL: PAINLEVEL_OUTOF10: 10-WORST PAIN EVER

## 2025-06-02 NOTE — PROGRESS NOTES
Jaja Bradshaw is a 69 y.o. female with a history of a right para/pretracheal mass.  This was seen on prior CT scan, she underwent a cervical mediastinoscopy at which time we felt we got a biopsy of this mass, results of this and lymph node showed only granulomatous disease. However, this lesion continued to grow and patient has diffuse pain/myasthenias, weakness and remains concerning.   She is now status post robotic assisted paratracheal mass excision on 4/30/2025. Path shows small cell carcinoma.     At the time of discharge, her pain was controlled on an oral pain regimen, She was breathing comfortably on room air.  She was ambulating independently.  She was tolerating a regular diet without nausea. She was voiding regularly.       5/14/2025: phone call reporting chest wall pain and dysphagia.  She does also report worsening swallowing.  She reports pain with swallowing worse since her procedure.  She states that she has had regurgitation of food if she takes too big of bites.  She has had loss of appetite.     6/2/2025: As of today she is tolerating fluids, voiding and moving her bowels. She reports pain with swallowing otherwise nontoxic  Esophagram   IMPRESSION:  1.  Indirect evidence of esophageal obstruction with retained food  material in the esophagus to the level of the thoracic inlet. Lower  esophagus is not visualized with contrast.    A/P  - Discussed with Dr Johansen and Dr Gonzalez. Plan for infusion for 6/2-6/4  - Plan for outpatient EGD, possible dilation, possible foreign body removal, possible PEG tube placement   - Discussed with Dr Johansen and Dr Gonzalez  - Patient agreeable with plan, discussed signs and symptoms to be aware of that indicate presentation to the ER    Heydi Marcum PA-C  Thoracic Surgery [id number]

## 2025-06-02 NOTE — H&P (VIEW-ONLY)
Jaja Bradshaw is a 69 y.o. female with a history of a right para/pretracheal mass.  This was seen on prior CT scan, she underwent a cervical mediastinoscopy at which time we felt we got a biopsy of this mass, results of this and lymph node showed only granulomatous disease. However, this lesion continued to grow and patient has diffuse pain/myasthenias, weakness and remains concerning.   She is now status post robotic assisted paratracheal mass excision on 4/30/2025. Path shows small cell carcinoma.     At the time of discharge, her pain was controlled on an oral pain regimen, She was breathing comfortably on room air.  She was ambulating independently.  She was tolerating a regular diet without nausea. She was voiding regularly.       5/14/2025: phone call reporting chest wall pain and dysphagia.  She does also report worsening swallowing.  She reports pain with swallowing worse since her procedure.  She states that she has had regurgitation of food if she takes too big of bites.  She has had loss of appetite.     6/2/2025: As of today she is tolerating fluids, voiding and moving her bowels. She reports pain with swallowing otherwise nontoxic  Esophagram   IMPRESSION:  1.  Indirect evidence of esophageal obstruction with retained food  material in the esophagus to the level of the thoracic inlet. Lower  esophagus is not visualized with contrast.    A/P  - Discussed with Dr Johansen and Dr Gonzalez. Plan for infusion for 6/2-6/4  - Plan for outpatient EGD, possible dilation, possible foreign body removal, possible PEG tube placement   - Discussed with Dr Johansen and Dr Gonzalez  - Patient agreeable with plan, discussed signs and symptoms to be aware of that indicate presentation to the ER    Heydi Marcum PA-C  Thoracic Surgery [id number]

## 2025-06-03 ENCOUNTER — INFUSION (OUTPATIENT)
Dept: HEMATOLOGY/ONCOLOGY | Facility: CLINIC | Age: 70
End: 2025-06-03
Payer: MEDICARE

## 2025-06-03 VITALS
DIASTOLIC BLOOD PRESSURE: 79 MMHG | HEART RATE: 92 BPM | HEIGHT: 65 IN | BODY MASS INDEX: 13.41 KG/M2 | TEMPERATURE: 97.2 F | RESPIRATION RATE: 19 BRPM | SYSTOLIC BLOOD PRESSURE: 137 MMHG | OXYGEN SATURATION: 99 % | WEIGHT: 80.47 LBS

## 2025-06-03 DIAGNOSIS — C77.9 METASTATIC SMALL CELL CARCINOMA TO LYMPH NODE: ICD-10-CM

## 2025-06-03 PROCEDURE — 96366 THER/PROPH/DIAG IV INF ADDON: CPT | Mod: INF

## 2025-06-03 PROCEDURE — 96375 TX/PRO/DX INJ NEW DRUG ADDON: CPT | Mod: INF

## 2025-06-03 PROCEDURE — 96413 CHEMO IV INFUSION 1 HR: CPT

## 2025-06-03 PROCEDURE — 2500000004 HC RX 250 GENERAL PHARMACY W/ HCPCS (ALT 636 FOR OP/ED): Performed by: STUDENT IN AN ORGANIZED HEALTH CARE EDUCATION/TRAINING PROGRAM

## 2025-06-03 PROCEDURE — 2500000001 HC RX 250 WO HCPCS SELF ADMINISTERED DRUGS (ALT 637 FOR MEDICARE OP): Performed by: STUDENT IN AN ORGANIZED HEALTH CARE EDUCATION/TRAINING PROGRAM

## 2025-06-03 RX ORDER — ALBUTEROL SULFATE 0.83 MG/ML
3 SOLUTION RESPIRATORY (INHALATION) AS NEEDED
Status: DISCONTINUED | OUTPATIENT
Start: 2025-06-03 | End: 2025-06-03 | Stop reason: HOSPADM

## 2025-06-03 RX ORDER — POTASSIUM CHLORIDE 29.8 MG/ML
40 INJECTION INTRAVENOUS ONCE
Status: COMPLETED | OUTPATIENT
Start: 2025-06-03 | End: 2025-06-03

## 2025-06-03 RX ORDER — DIPHENHYDRAMINE HYDROCHLORIDE 50 MG/ML
50 INJECTION, SOLUTION INTRAMUSCULAR; INTRAVENOUS AS NEEDED
Status: DISCONTINUED | OUTPATIENT
Start: 2025-06-03 | End: 2025-06-03 | Stop reason: HOSPADM

## 2025-06-03 RX ORDER — PROCHLORPERAZINE EDISYLATE 5 MG/ML
10 INJECTION INTRAMUSCULAR; INTRAVENOUS EVERY 6 HOURS PRN
Status: DISCONTINUED | OUTPATIENT
Start: 2025-06-03 | End: 2025-06-03 | Stop reason: HOSPADM

## 2025-06-03 RX ORDER — EPINEPHRINE 0.3 MG/.3ML
0.3 INJECTION SUBCUTANEOUS EVERY 5 MIN PRN
Status: DISCONTINUED | OUTPATIENT
Start: 2025-06-03 | End: 2025-06-03 | Stop reason: HOSPADM

## 2025-06-03 RX ORDER — FAMOTIDINE 10 MG/ML
20 INJECTION, SOLUTION INTRAVENOUS ONCE AS NEEDED
Status: DISCONTINUED | OUTPATIENT
Start: 2025-06-03 | End: 2025-06-03 | Stop reason: HOSPADM

## 2025-06-03 RX ORDER — PROCHLORPERAZINE MALEATE 10 MG
10 TABLET ORAL EVERY 6 HOURS PRN
Status: DISCONTINUED | OUTPATIENT
Start: 2025-06-03 | End: 2025-06-03 | Stop reason: HOSPADM

## 2025-06-03 RX ORDER — POTASSIUM CHLORIDE 1.5 G/1.58G
20 POWDER, FOR SOLUTION ORAL ONCE
Status: COMPLETED | OUTPATIENT
Start: 2025-06-03 | End: 2025-06-03

## 2025-06-03 RX ADMIN — POTASSIUM CHLORIDE 20 MEQ: 1.5 POWDER, FOR SOLUTION ORAL at 09:39

## 2025-06-03 RX ADMIN — DEXAMETHASONE SODIUM PHOSPHATE 8 MG: 4 INJECTION INTRA-ARTICULAR; INTRALESIONAL; INTRAMUSCULAR; INTRAVENOUS; SOFT TISSUE at 08:18

## 2025-06-03 RX ADMIN — ETOPOSIDE 98 MG: 20 INJECTION, SOLUTION INTRAVENOUS at 08:49

## 2025-06-03 RX ADMIN — POTASSIUM CHLORIDE 40 MEQ: 400 INJECTION, SOLUTION INTRAVENOUS at 09:39

## 2025-06-03 ASSESSMENT — PAIN SCALES - GENERAL: PAINLEVEL_OUTOF10: 10-WORST PAIN EVER

## 2025-06-03 NOTE — PROGRESS NOTES
Received a call regarding critical lab (potassium 2.9) from lab drawn at 13:54 today. Patient with small cell carcinoma of paratracheum/mediastinum who presented to [facility] for first dose of carboplatin/etoposide. Medication list reviewed, does not appear that potassium supplementation was administered while at infusion center today and no potassium supplement listed on patient's home med list. I attempted to call the patient multiple times to discuss with her and assess for symptoms of hypokalemia. Unfortunately no response. Will alert her primary oncology team so they may follow up with the patient in the AM.

## 2025-06-03 NOTE — SIGNIFICANT EVENT

## 2025-06-03 NOTE — PROGRESS NOTES
Pt's K=2.9 from yesterday's labs.  Repleted today with 40 mEq IV and 20 mEq po.  Tolerated well.    RD stopped by for some teaching on full liq diet as well as provided suppletments.

## 2025-06-04 ENCOUNTER — INFUSION (OUTPATIENT)
Dept: HEMATOLOGY/ONCOLOGY | Facility: CLINIC | Age: 70
End: 2025-06-04
Payer: MEDICARE

## 2025-06-04 ENCOUNTER — OFFICE VISIT (OUTPATIENT)
Dept: HEMATOLOGY/ONCOLOGY | Facility: CLINIC | Age: 70
End: 2025-06-04
Payer: MEDICARE

## 2025-06-04 ENCOUNTER — NUTRITION (OUTPATIENT)
Dept: HEMATOLOGY/ONCOLOGY | Facility: CLINIC | Age: 70
End: 2025-06-04

## 2025-06-04 VITALS
DIASTOLIC BLOOD PRESSURE: 84 MMHG | TEMPERATURE: 97.5 F | SYSTOLIC BLOOD PRESSURE: 134 MMHG | RESPIRATION RATE: 17 BRPM | HEART RATE: 79 BPM | BODY MASS INDEX: 13.99 KG/M2 | WEIGHT: 84 LBS | HEIGHT: 65 IN | OXYGEN SATURATION: 96 %

## 2025-06-04 DIAGNOSIS — G62.89 OTHER POLYNEUROPATHY: ICD-10-CM

## 2025-06-04 DIAGNOSIS — C77.9 METASTATIC SMALL CELL CARCINOMA TO LYMPH NODE: ICD-10-CM

## 2025-06-04 DIAGNOSIS — R63.4 WEIGHT LOSS: ICD-10-CM

## 2025-06-04 DIAGNOSIS — C77.9 METASTATIC SMALL CELL CARCINOMA TO LYMPH NODE: Primary | ICD-10-CM

## 2025-06-04 DIAGNOSIS — R13.19 ESOPHAGEAL DYSPHAGIA: ICD-10-CM

## 2025-06-04 PROCEDURE — 96413 CHEMO IV INFUSION 1 HR: CPT

## 2025-06-04 PROCEDURE — 96374 THER/PROPH/DIAG INJ IV PUSH: CPT | Mod: INF

## 2025-06-04 PROCEDURE — 99214 OFFICE O/P EST MOD 30 MIN: CPT | Performed by: NURSE PRACTITIONER

## 2025-06-04 PROCEDURE — 2500000004 HC RX 250 GENERAL PHARMACY W/ HCPCS (ALT 636 FOR OP/ED): Performed by: STUDENT IN AN ORGANIZED HEALTH CARE EDUCATION/TRAINING PROGRAM

## 2025-06-04 RX ORDER — EPINEPHRINE 0.3 MG/.3ML
0.3 INJECTION SUBCUTANEOUS EVERY 5 MIN PRN
Status: DISCONTINUED | OUTPATIENT
Start: 2025-06-04 | End: 2025-06-04 | Stop reason: HOSPADM

## 2025-06-04 RX ORDER — PROCHLORPERAZINE EDISYLATE 5 MG/ML
10 INJECTION INTRAMUSCULAR; INTRAVENOUS EVERY 6 HOURS PRN
Status: DISCONTINUED | OUTPATIENT
Start: 2025-06-04 | End: 2025-06-04 | Stop reason: HOSPADM

## 2025-06-04 RX ORDER — ALBUTEROL SULFATE 0.83 MG/ML
3 SOLUTION RESPIRATORY (INHALATION) AS NEEDED
Status: DISCONTINUED | OUTPATIENT
Start: 2025-06-04 | End: 2025-06-04 | Stop reason: HOSPADM

## 2025-06-04 RX ORDER — PROCHLORPERAZINE MALEATE 10 MG
10 TABLET ORAL EVERY 6 HOURS PRN
Status: DISCONTINUED | OUTPATIENT
Start: 2025-06-04 | End: 2025-06-04 | Stop reason: HOSPADM

## 2025-06-04 RX ORDER — FAMOTIDINE 10 MG/ML
20 INJECTION, SOLUTION INTRAVENOUS ONCE AS NEEDED
Status: DISCONTINUED | OUTPATIENT
Start: 2025-06-04 | End: 2025-06-04 | Stop reason: HOSPADM

## 2025-06-04 RX ORDER — ONDANSETRON HYDROCHLORIDE 2 MG/ML
8 INJECTION, SOLUTION INTRAVENOUS ONCE
Status: COMPLETED | OUTPATIENT
Start: 2025-06-04 | End: 2025-06-04

## 2025-06-04 RX ORDER — DIPHENHYDRAMINE HYDROCHLORIDE 50 MG/ML
50 INJECTION, SOLUTION INTRAMUSCULAR; INTRAVENOUS AS NEEDED
Status: DISCONTINUED | OUTPATIENT
Start: 2025-06-04 | End: 2025-06-04 | Stop reason: HOSPADM

## 2025-06-04 RX ADMIN — ONDANSETRON 8 MG: 2 INJECTION INTRAMUSCULAR; INTRAVENOUS at 08:17

## 2025-06-04 RX ADMIN — DEXAMETHASONE SODIUM PHOSPHATE 8 MG: 4 INJECTION INTRA-ARTICULAR; INTRALESIONAL; INTRAMUSCULAR; INTRAVENOUS; SOFT TISSUE at 08:17

## 2025-06-04 RX ADMIN — SODIUM CHLORIDE 98 MG: 0.9 INJECTION, SOLUTION INTRAVENOUS at 08:42

## 2025-06-04 ASSESSMENT — PAIN SCALES - GENERAL: PAINLEVEL_OUTOF10: 10-WORST PAIN EVER

## 2025-06-04 NOTE — SIGNIFICANT EVENT

## 2025-06-04 NOTE — PROGRESS NOTES
NUTRITION Assessment NOTE    Nutrition Assessment     Reason for Visit:  Jaja Bradshaw is a 69 y.o. 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      Met with patient and her spouse this am in infusion. She is here for treatment.     Problem List[1]    Nutrition Significant labs:  Lab Results   Component Value Date/Time    GLUCOSE 98 06/02/2025 1354     06/02/2025 1354    K 2.9 (LL) 06/02/2025 1354    CL 99 06/02/2025 1354    CO2 28 06/02/2025 1354    ANIONGAP 17 06/02/2025 1354    BUN 10 06/02/2025 1354    CREATININE 0.51 06/02/2025 1354    EGFR >90 06/02/2025 1354    CALCIUM 10.4 06/02/2025 1354    ALBUMIN 4.6 06/02/2025 1354    ALKPHOS 51 06/02/2025 1354    PROT 7.0 06/02/2025 1354    AST 23 06/02/2025 1354    BILITOT 1.1 06/02/2025 1354    ALT 16 06/02/2025 1354    MG 1.74 05/02/2025 0753     Lab Results   Component Value Date/Time    VITD25 29 (A) 02/15/2020 0755     CMP Trend:    Recent Labs     06/02/25  1354 05/02/25  0753 05/01/25  0717 12/02/24  0958 11/01/24  1126   GLUCOSE 98 90 81 104* 118*    140 138 138 136   K 2.9* 3.7 3.7 3.5 2.9*   CL 99 102 99 101 103   CO2 28 29 27 28 22   ANIONGAP 17 13 16 13 14   BUN 10 8 8 7 5*   CREATININE 0.51 0.40* 0.57 0.67 0.62   EGFR >90 >90 >90 >90 >90   CALCIUM 10.4 9.7 9.8 9.3 9.6   ALBUMIN 4.6  --  3.8 4.0 4.2   ALKPHOS 51  --   --  87 76   PROT 7.0  --   --  6.5 7.1   AST 23  --   --  39 25   BILITOT 1.1  --   --  0.9 1.2   ALT 16  --   --  21 14   , CBC Trend:  "  Recent Labs     06/02/25  1354 05/02/25  0753 05/01/25  0717 12/02/24  0958   WBC 8.5 9.8 14.0* 7.9   NRBC  --  0.0 0.0 0.0   RBC 3.90* 3.17* 3.00* 4.32   HGB 13.2 10.5* 10.0* 14.2   HCT 38.1 32.3* 30.2* 41.6   MCV 98 102* 101* 96   MCH 33.8 33.1 33.3 32.9   MCHC 34.6 32.5 33.1 34.1   RDW 12.2 13.0 12.9 14.6*    190 178 197   , RFP + Serum Mag Trend:   Recent Labs     06/02/25  1354 05/02/25  0753 05/01/25  0717 12/02/24  0958 11/01/24  1126   GLUCOSE 98 90 81 104* 118*    140 138 138 136   K 2.9* 3.7 3.7 3.5 2.9*   CL 99 102 99 101 103   CO2 28 29 27 28 22   ANIONGAP 17 13 16 13 14   BUN 10 8 8 7 5*   CREATININE 0.51 0.40* 0.57 0.67 0.62   EGFR >90 >90 >90 >90 >90   CALCIUM 10.4 9.7 9.8 9.3 9.6   ALBUMIN 4.6  --  3.8 4.0 4.2   MG  --  1.74 1.86  --  1.79   , LFT Trend:   Recent Labs     06/02/25  1354 05/01/25  0717 12/02/24  0958 11/01/24  1126   ALBUMIN 4.6 3.8 4.0 4.2   BILITOT 1.1  --  0.9 1.2   ALKPHOS 51  --  87 76   ALT 16  --  21 14   AST 23  --  39 25   PROT 7.0  --  6.5 7.1   , DM Specific Labs Trend (Includes HgbA1C, antibodies & fasting insulin):   Recent Labs     12/02/24  0958   HGBA1C 5.2   , Lipid Panel Trend:    Recent Labs     12/02/24  0958 02/15/20  0755 06/15/19  0840 10/19/18  1640   CHOL 225* 174 236* 148   HDL 50.6 65.8 51.6 65.6   LDLCALC 151*  --   --   --    LDLF  --  95 144* 69   VLDL 23 13 40 14   TRIG 116 65 201* 68   , Iron Panel + Serum Ferritin Trend:   Recent Labs     08/04/19  0859   IRON 140   TIBC 413   IRONSAT 34   FERRITIN 144   , Vitamin B12:   Lab Results   Component Value Date    BSWXGRKM04 276 12/02/2024    , Folate: No results found for: \"FOLATE\" , Vitamin D:   Lab Results   Component Value Date    VITD25 29 (A) 02/15/2020    , and CRP Trend (last 3): No results found for: \"HSCRP\"     Anthropometrics:               IBW/kg (Dietitian Calculated):  (54.5 kg) Percent of IBW:  (69% IBW)          Weight History: 20% wt loss x 7 months   33% loss from UBW " (unspecified time frame)  Wt Readings from Last 20 Encounters:   06/04/25 (!) 38.1 kg (83 lb 15.9 oz)   06/03/25 (!) 36.5 kg (80 lb 7.5 oz)   06/02/25 (!) 36.1 kg (79 lb 8 oz)   05/21/25 (!) 37.2 kg (82 lb)   05/20/25 (!) 37.6 kg (83 lb)   05/29/25 (!) 38.6 kg (85 lb)   04/30/25 (!) 39 kg (85 lb 15.7 oz)   04/15/25 (!) 39 kg (86 lb)   03/21/25 (!) 38.9 kg (85 lb 12.8 oz)   03/19/25 (!) 38.1 kg (84 lb)   02/11/25 (!) 39.5 kg (87 lb)   01/30/25 (!) 40.9 kg (90 lb 2.7 oz)   01/23/25 (!) 42 kg (92 lb 9.6 oz)   01/21/25 (!) 42.4 kg (93 lb 6.4 oz)   12/19/24 (!) 44 kg (97 lb)   12/02/24 (!) 43.8 kg (96 lb 9.6 oz)   11/01/24 47.2 kg (104 lb)       Weight Change %:  Weight History / % Weight Change: 22% x 7 months  Significant Weight Loss: Yes  Interpretation of Weight Loss: >10% in 6 months    Nutrition History:  Food and Nutrient History  Food and Nutrient History: Patient reports that yesterday she was advised to begin a full liquid diet .  She reports food sticking in her chest causing significant pain. She had a barium swallow test done 6/2 where retained food material was seen in esophagus.  She is having an EGD with dilation done on Friday. My colleague briefly saw patient yesterday to provide full liquid handout, supplement samples/coupons, and recipes for shakes /smoothies. Patient states she did a [employer] order and purchased several nutrition shakes: [employer], Ensure Plus, protein powder, recipe ingredients such as CIB packets. She reports significant weight loss since the fall, despite eating.  Since Feb, weight fluctuating from 82-85 lbs.   She states she has very dry mouth and dairy products leave an aftertaste, but she generally tolerates dairy without an issue.  She is staying well hydrated with water, Gatorade, juice, and soups.   She  currently does not have dentures, as they are ill-fitting, which was creating eating difficulties even prior to difficulty swallowing.  Denies n/v.  She takes Miralax  for constipation.  She adjusts prn to prevent excess loose stools.  It seems to be working well at this time.  Was taking Vitamin D but just recently stopped until after this procedure is done. car    Food Intake  Meal 1: Tried [employer] shake (did not get a chance to drink all of it)  Meal 2: Blended/thinned cream of broccoli soup  Fluid Intake: Apple Juice, Water, Gatorade,              Current Medications[2]     Nutrition Focused Physical Exam Findings:    Subcutaneous Fat Loss  Orbital Fat Pads: Severe (dark circles, hollowing and loose skin)  Buccal Fat Pads: Severe (hollow, sunken and narrow face)  Ribs: Defer    Muscle Wasting  Temporalis: Severe (hollowed scooping depression)  Pectoralis (Clavicular Region): Severe (protruding prominent clavicle)  Deltoid/Trapezius: Defer  Interosseous: Defer  Quadriceps: Defer    Physical Findings  Mouth Findings: Xerostomia, Dysphagia  Teeth Findings: Edentulous         Estimated Needs:       Estimated Energy Needs  Total Energy Estimated Needs in 24 hours (kCal):  (6277-9359 (35-40 cals/kg))  Estimated Protein Needs  Total Protein Estimated Needs in 24 Hours (g):  (60-75 g (1.5-18 g/kg))  Estimated Fluid Needs  Total Fluid Estimated Needs in 24 Hours (mL):  (1200 mls (30 mls/kg))             Nutrition Diagnosis   Malnutrition Diagnosis  Patient has Malnutrition Diagnosis: Yes  Diagnosis Status: New  Malnutrition Diagnosis: Severe malnutrition related to chronic disease or condition  Related to: difficulty swallowing thus poor oral intake in setting of paratracheal mass and chemotherapy treatment  As Evidenced by: 22% weight loss x 7 months, severe muscle/fat wasting, at 69% IBW    Nutrition Diagnosis  Patient has Nutrition Diagnosis: Yes  Diagnosis Status (1): New  Nutrition Diagnosis 1: Swallowing difficulty  Related to (1): paratracheal mass (small cell carcinoma)  As Evidenced by (1): residual food sticking in esophagus and difficulty swallowing solid foods        Nutrition Interventions/Recommendations   Nutrition Prescription: Individualized Nutrition Prescription Provided for : Oral nutrition     Recommendations:      Nutrition Interventions:   Food and Nutrient Delivery: Food and Nutrition Delivery  Meals & Snacks: Energy-modified diet, Texture-Modified Diet, Fluid-modified diet, Protein-modified diet  Goals: 3 high calorie oral supplement shakes, full liquid diet , 50+ oz fluid daily     Coordination of Care: Coordination of Nutrition Care by a Nutrition Professional  Collaboration and referral of nutrition care: Collaboration and Referral of Nutrition Care     Nutrition Education:   Nutrition Education Content: Nutrition Education Content: Content related nutrition education   Encouraged patient to consume 3 high calorie smoothies/shakes per day (with ~400+ calories in each).  Recipes have been provided and include calorie content on each. In addition, patient can also use pre-mixed commercial drinks and blend with fruit, peanut butter, ice cream etc, to improve calorie content (I.e Ensure Plus, [employer], [place] Instant Breakfast packets etc).  Can use milk vs. Water to make creamy soups, thinned hot cereals, etc (has several recipes)  Suggest liquid one a day MVI  Suggest incorporation of calorie-containing liquids  (Juice, milk, sports drinks, etc)   Handouts provided/ reviewed: High Calorie Smoothie Recipes ; Full Liquid Diet (PI sheet) with blended Pureed Recipes                  Nutrition Monitoring and Evaluation   Food and Nutrient Intake  Monitoring and Evaluation Plan: Energy intake, Protein intake, Fluid intake  Energy Intake: Estimated energy intake  Criteria: Meet >75% estimated nico needs  Fluid Intake:  (50+ oz caffeine free fluids daily)  Estimated protein intake: Estimated protein intake  Criteria: Meet >75% estimated protein needs    Anthropometric measurements  Monitoring and Evaluation Plan: Weight  Body Weight: Measured body  weight  Criteria: Weight gain         Nutrition Focused Physical Findings  Monitoring and Evaluation Plan: Muscle, Adipose  Criteria: Improving fat stores  Muscle Finding: Muscle atrophy  Criteria: Improved atrophy         Follow Up:  will follow throughout treatment.  Patient has RDN card and can call with any additional questions/concerns.     Time Spent  Prep time on day of patient encounter: 15 minutes  Time spent directly with patient, family or caregiver: 30 minutes  Additional Time Spent on Patient Care Activities: 10 minutes  Documentation Time: 35 minutes  Other Time Spent: 0 minutes  Total: 90 minutes             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 (Patient not taking: Reported on 6/2/2025), Disp: 30 tablet, Rfl: 0    aspirin 81 mg EC tablet, Take 1 tablet (81 mg) by mouth once daily. (Patient not taking: Reported on 6/2/2025), Disp: , Rfl:     cholecalciferol (Vitamin D3) 25 MCG (1000 UT) tablet, Take 2 tablets (2,000 Units)  by mouth 5 times a day. (Patient not taking: Reported on 6/2/2025), Disp: , Rfl:     ibuprofen 600 mg tablet, Take 1 tablet (600 mg) by mouth every 6 hours. (Patient not taking: Reported on 6/2/2025), Disp: 30 tablet, Rfl: 0    levothyroxine (Synthroid, Levoxyl) 88 mcg tablet, Take 1 tablet (88 mcg) by mouth early in the morning.. Take on an empty stomach at the same time each day, either 30 to 60 minutes prior to breakfast, Disp: 90 tablet, Rfl: 1    OLANZapine (ZyPREXA) 5 mg tablet, Take 1 tablet (5 mg) by mouth once daily at bedtime. For 4 days starting the evening of treatment., Disp: 4 tablet, Rfl: 5    ondansetron (Zofran) 8 mg tablet, Take 1 tablet (8 mg) by mouth every 8 hours if needed for nausea or vomiting., Disp: 30 tablet, Rfl: 5    prochlorperazine (Compazine) 10 mg tablet, Take 1 tablet (10 mg) by mouth every 6 hours if needed for nausea or vomiting., Disp: 30 tablet, Rfl: 5  No current facility-administered medications for this visit.

## 2025-06-05 ENCOUNTER — ANESTHESIA EVENT (OUTPATIENT)
Dept: OPERATING ROOM | Facility: HOSPITAL | Age: 70
End: 2025-06-05
Payer: MEDICARE

## 2025-06-06 ENCOUNTER — HOSPITAL ENCOUNTER (OUTPATIENT)
Facility: HOSPITAL | Age: 70
Setting detail: OUTPATIENT SURGERY
Discharge: HOME | End: 2025-06-06
Attending: STUDENT IN AN ORGANIZED HEALTH CARE EDUCATION/TRAINING PROGRAM | Admitting: STUDENT IN AN ORGANIZED HEALTH CARE EDUCATION/TRAINING PROGRAM
Payer: MEDICARE

## 2025-06-06 ENCOUNTER — NUTRITION (OUTPATIENT)
Dept: HEMATOLOGY/ONCOLOGY | Facility: HOSPITAL | Age: 70
End: 2025-06-06

## 2025-06-06 ENCOUNTER — TELEPHONE (OUTPATIENT)
Dept: SURGERY | Facility: HOSPITAL | Age: 70
End: 2025-06-06

## 2025-06-06 ENCOUNTER — APPOINTMENT (OUTPATIENT)
Dept: PULMONOLOGY | Facility: CLINIC | Age: 70
End: 2025-06-06
Payer: MEDICARE

## 2025-06-06 ENCOUNTER — DOCUMENTATION (OUTPATIENT)
Dept: HOME HEALTH SERVICES | Facility: HOME HEALTH | Age: 70
End: 2025-06-06

## 2025-06-06 ENCOUNTER — ANESTHESIA (OUTPATIENT)
Dept: OPERATING ROOM | Facility: HOSPITAL | Age: 70
End: 2025-06-06
Payer: MEDICARE

## 2025-06-06 VITALS
DIASTOLIC BLOOD PRESSURE: 59 MMHG | OXYGEN SATURATION: 95 % | HEART RATE: 81 BPM | HEIGHT: 64 IN | RESPIRATION RATE: 14 BRPM | BODY MASS INDEX: 13.29 KG/M2 | WEIGHT: 77.82 LBS | SYSTOLIC BLOOD PRESSURE: 109 MMHG | TEMPERATURE: 96.8 F

## 2025-06-06 VITALS — HEIGHT: 64 IN | BODY MASS INDEX: 13.35 KG/M2

## 2025-06-06 DIAGNOSIS — R13.10 DYSPHAGIA, UNSPECIFIED TYPE: ICD-10-CM

## 2025-06-06 DIAGNOSIS — R53.1 WEAKNESS GENERALIZED: ICD-10-CM
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
## 2025-06-06 DIAGNOSIS — B37.81 ESOPHAGEAL CANDIDIASIS (MULTI): ICD-10-CM

## 2025-06-06 DIAGNOSIS — R13.19 ESOPHAGEAL DYSPHAGIA: ICD-10-CM

## 2025-06-06 DIAGNOSIS — C77.9 METASTATIC SMALL CELL CARCINOMA TO LYMPH NODE: ICD-10-CM

## 2025-06-06 DIAGNOSIS — C77.9 METASTATIC SMALL CELL CARCINOMA TO LYMPH NODE: Primary | ICD-10-CM

## 2025-06-06 DIAGNOSIS — G89.18 PAIN ASSOCIATED WITH SURGICAL PROCEDURE: Primary | ICD-10-CM

## 2025-06-06 DIAGNOSIS — C80.1 SMALL CELL CARCINOMA: Primary | ICD-10-CM

## 2025-06-06 DIAGNOSIS — T18.128A ESOPHAGEAL OBSTRUCTION DUE TO FOOD IMPACTION: ICD-10-CM

## 2025-06-06 LAB
ABO GROUP (TYPE) IN BLOOD: NORMAL
ANTIBODY SCREEN: NORMAL
RH FACTOR (ANTIGEN D): NORMAL

## 2025-06-06 PROCEDURE — 2500000005 HC RX 250 GENERAL PHARMACY W/O HCPCS: Performed by: STUDENT IN AN ORGANIZED HEALTH CARE EDUCATION/TRAINING PROGRAM

## 2025-06-06 PROCEDURE — 86901 BLOOD TYPING SEROLOGIC RH(D): CPT | Performed by: ANESTHESIOLOGY

## 2025-06-06 PROCEDURE — 7100000001 HC RECOVERY ROOM TIME - INITIAL BASE CHARGE: Performed by: STUDENT IN AN ORGANIZED HEALTH CARE EDUCATION/TRAINING PROGRAM

## 2025-06-06 PROCEDURE — 86870 RBC ANTIBODY IDENTIFICATION: CPT

## 2025-06-06 PROCEDURE — 7100000010 HC PHASE TWO TIME - EACH INCREMENTAL 1 MINUTE: Performed by: STUDENT IN AN ORGANIZED HEALTH CARE EDUCATION/TRAINING PROGRAM

## 2025-06-06 PROCEDURE — 86850 RBC ANTIBODY SCREEN: CPT | Performed by: ANESTHESIOLOGY

## 2025-06-06 PROCEDURE — 36415 COLL VENOUS BLD VENIPUNCTURE: CPT | Performed by: ANESTHESIOLOGY

## 2025-06-06 PROCEDURE — 7100000002 HC RECOVERY ROOM TIME - EACH INCREMENTAL 1 MINUTE: Performed by: STUDENT IN AN ORGANIZED HEALTH CARE EDUCATION/TRAINING PROGRAM

## 2025-06-06 PROCEDURE — 3600000008 HC OR TIME - EACH INCREMENTAL 1 MINUTE - PROCEDURE LEVEL THREE: Performed by: STUDENT IN AN ORGANIZED HEALTH CARE EDUCATION/TRAINING PROGRAM

## 2025-06-06 PROCEDURE — 2500000004 HC RX 250 GENERAL PHARMACY W/ HCPCS (ALT 636 FOR OP/ED)

## 2025-06-06 PROCEDURE — 2500000001 HC RX 250 WO HCPCS SELF ADMINISTERED DRUGS (ALT 637 FOR MEDICARE OP): Performed by: ANESTHESIOLOGY

## 2025-06-06 PROCEDURE — 7100000009 HC PHASE TWO TIME - INITIAL BASE CHARGE: Performed by: STUDENT IN AN ORGANIZED HEALTH CARE EDUCATION/TRAINING PROGRAM

## 2025-06-06 PROCEDURE — 3700000002 HC GENERAL ANESTHESIA TIME - EACH INCREMENTAL 1 MINUTE: Performed by: STUDENT IN AN ORGANIZED HEALTH CARE EDUCATION/TRAINING PROGRAM

## 2025-06-06 PROCEDURE — 3600000003 HC OR TIME - INITIAL BASE CHARGE - PROCEDURE LEVEL THREE: Performed by: STUDENT IN AN ORGANIZED HEALTH CARE EDUCATION/TRAINING PROGRAM

## 2025-06-06 PROCEDURE — 43246 EGD PLACE GASTROSTOMY TUBE: CPT | Performed by: STUDENT IN AN ORGANIZED HEALTH CARE EDUCATION/TRAINING PROGRAM

## 2025-06-06 PROCEDURE — 2500000004 HC RX 250 GENERAL PHARMACY W/ HCPCS (ALT 636 FOR OP/ED): Mod: JZ,TB | Performed by: ANESTHESIOLOGY

## 2025-06-06 PROCEDURE — 3700000001 HC GENERAL ANESTHESIA TIME - INITIAL BASE CHARGE: Performed by: STUDENT IN AN ORGANIZED HEALTH CARE EDUCATION/TRAINING PROGRAM

## 2025-06-06 RX ORDER — CEFAZOLIN 1 G/1
INJECTION, POWDER, FOR SOLUTION INTRAVENOUS AS NEEDED
Status: DISCONTINUED | OUTPATIENT
Start: 2025-06-06 | End: 2025-06-06

## 2025-06-06 RX ORDER — NYSTATIN 100000 [USP'U]/ML
5 SUSPENSION ORAL 4 TIMES DAILY
Qty: 200 ML | Refills: 0 | Status: SHIPPED | OUTPATIENT
Start: 2025-06-06 | End: 2025-06-16

## 2025-06-06 RX ORDER — MIDAZOLAM HYDROCHLORIDE 1 MG/ML
INJECTION INTRAMUSCULAR; INTRAVENOUS AS NEEDED
Status: DISCONTINUED | OUTPATIENT
Start: 2025-06-06 | End: 2025-06-06

## 2025-06-06 RX ORDER — ONDANSETRON HYDROCHLORIDE 2 MG/ML
INJECTION, SOLUTION INTRAVENOUS AS NEEDED
Status: DISCONTINUED | OUTPATIENT
Start: 2025-06-06 | End: 2025-06-06

## 2025-06-06 RX ORDER — LIDOCAINE HYDROCHLORIDE 10 MG/ML
0.1 INJECTION, SOLUTION INFILTRATION; PERINEURAL ONCE
Status: DISCONTINUED | OUTPATIENT
Start: 2025-06-06 | End: 2025-06-06 | Stop reason: HOSPADM

## 2025-06-06 RX ORDER — HYDROMORPHONE HYDROCHLORIDE 0.2 MG/ML
0.2 INJECTION INTRAMUSCULAR; INTRAVENOUS; SUBCUTANEOUS EVERY 5 MIN PRN
Status: DISCONTINUED | OUTPATIENT
Start: 2025-06-06 | End: 2025-06-06 | Stop reason: HOSPADM

## 2025-06-06 RX ORDER — PHENYLEPHRINE HYDROCHLORIDE 10 MG/ML
INJECTION INTRAVENOUS AS NEEDED
Status: DISCONTINUED | OUTPATIENT
Start: 2025-06-06 | End: 2025-06-06

## 2025-06-06 RX ORDER — OXYCODONE HYDROCHLORIDE 5 MG/1
5 TABLET ORAL EVERY 6 HOURS PRN
Qty: 5 TABLET | Refills: 0 | Status: SHIPPED | OUTPATIENT
Start: 2025-06-06 | End: 2025-06-11

## 2025-06-06 RX ORDER — ACETAMINOPHEN 325 MG/1
650 TABLET ORAL ONCE
Status: COMPLETED | OUTPATIENT
Start: 2025-06-06 | End: 2025-06-06

## 2025-06-06 RX ORDER — FLUCONAZOLE 2 MG/ML
INJECTION, SOLUTION INTRAVENOUS AS NEEDED
Status: DISCONTINUED | OUTPATIENT
Start: 2025-06-06 | End: 2025-06-06

## 2025-06-06 RX ORDER — ROCURONIUM BROMIDE 10 MG/ML
INJECTION, SOLUTION INTRAVENOUS AS NEEDED
Status: DISCONTINUED | OUTPATIENT
Start: 2025-06-06 | End: 2025-06-06

## 2025-06-06 RX ORDER — PROPOFOL 10 MG/ML
INJECTION, EMULSION INTRAVENOUS AS NEEDED
Status: DISCONTINUED | OUTPATIENT
Start: 2025-06-06 | End: 2025-06-06

## 2025-06-06 RX ORDER — SODIUM CHLORIDE, SODIUM LACTATE, POTASSIUM CHLORIDE, CALCIUM CHLORIDE 600; 310; 30; 20 MG/100ML; MG/100ML; MG/100ML; MG/100ML
INJECTION, SOLUTION INTRAVENOUS CONTINUOUS PRN
Status: DISCONTINUED | OUTPATIENT
Start: 2025-06-06 | End: 2025-06-06

## 2025-06-06 RX ORDER — FENTANYL CITRATE 50 UG/ML
INJECTION, SOLUTION INTRAMUSCULAR; INTRAVENOUS AS NEEDED
Status: DISCONTINUED | OUTPATIENT
Start: 2025-06-06 | End: 2025-06-06

## 2025-06-06 RX ORDER — LIDOCAINE HCL/PF 100 MG/5ML
SYRINGE (ML) INTRAVENOUS AS NEEDED
Status: DISCONTINUED | OUTPATIENT
Start: 2025-06-06 | End: 2025-06-06

## 2025-06-06 RX ORDER — MEPERIDINE HYDROCHLORIDE 25 MG/ML
12.5 INJECTION INTRAMUSCULAR; INTRAVENOUS; SUBCUTANEOUS EVERY 10 MIN PRN
Status: DISCONTINUED | OUTPATIENT
Start: 2025-06-06 | End: 2025-06-06 | Stop reason: HOSPADM

## 2025-06-06 RX ORDER — ACETAMINOPHEN 650 MG/1
650 SUPPOSITORY RECTAL ONCE
Status: COMPLETED | OUTPATIENT
Start: 2025-06-06 | End: 2025-06-06

## 2025-06-06 RX ORDER — METOCLOPRAMIDE HYDROCHLORIDE 5 MG/ML
10 INJECTION INTRAMUSCULAR; INTRAVENOUS ONCE AS NEEDED
Status: COMPLETED | OUTPATIENT
Start: 2025-06-06 | End: 2025-06-06

## 2025-06-06 RX ORDER — SUCCINYLCHOLINE CHLORIDE 20 MG/ML
INJECTION INTRAMUSCULAR; INTRAVENOUS AS NEEDED
Status: DISCONTINUED | OUTPATIENT
Start: 2025-06-06 | End: 2025-06-06

## 2025-06-06 RX ORDER — ACETAMINOPHEN 160 MG/5ML
650 SOLUTION ORAL ONCE
Status: COMPLETED | OUTPATIENT
Start: 2025-06-06 | End: 2025-06-06

## 2025-06-06 RX ORDER — SODIUM CHLORIDE 0.9 G/100ML
INJECTION, SOLUTION IRRIGATION AS NEEDED
Status: DISCONTINUED | OUTPATIENT
Start: 2025-06-06 | End: 2025-06-06 | Stop reason: HOSPADM

## 2025-06-06 RX ORDER — LIDOCAINE HYDROCHLORIDE 10 MG/ML
INJECTION, SOLUTION EPIDURAL; INFILTRATION; INTRACAUDAL; PERINEURAL AS NEEDED
Status: DISCONTINUED | OUTPATIENT
Start: 2025-06-06 | End: 2025-06-06

## 2025-06-06 RX ADMIN — SUGAMMADEX 200 MG: 100 INJECTION, SOLUTION INTRAVENOUS at 08:51

## 2025-06-06 RX ADMIN — HYDROMORPHONE HYDROCHLORIDE 0.2 MG: 0.2 INJECTION, SOLUTION INTRAMUSCULAR; INTRAVENOUS; SUBCUTANEOUS at 09:33

## 2025-06-06 RX ADMIN — MIDAZOLAM HYDROCHLORIDE 2 MG: 2 INJECTION, SOLUTION INTRAMUSCULAR; INTRAVENOUS at 08:16

## 2025-06-06 RX ADMIN — SODIUM CHLORIDE, POTASSIUM CHLORIDE, SODIUM LACTATE AND CALCIUM CHLORIDE: 600; 310; 30; 20 INJECTION, SOLUTION INTRAVENOUS at 08:18

## 2025-06-06 RX ADMIN — ONDANSETRON 4 MG: 2 INJECTION INTRAMUSCULAR; INTRAVENOUS at 08:45

## 2025-06-06 RX ADMIN — ROCURONIUM BROMIDE 15 MG: 10 INJECTION INTRAVENOUS at 08:29

## 2025-06-06 RX ADMIN — SUCCINYLCHOLINE CHLORIDE 80 MG: 20 INJECTION, SOLUTION INTRAMUSCULAR; INTRAVENOUS at 08:26

## 2025-06-06 RX ADMIN — PHENYLEPHRINE HYDROCHLORIDE 80 MCG: 10 INJECTION INTRAVENOUS at 08:47

## 2025-06-06 RX ADMIN — PHENYLEPHRINE HYDROCHLORIDE 80 MCG: 10 INJECTION INTRAVENOUS at 08:32

## 2025-06-06 RX ADMIN — PROPOFOL 30 MG: 10 INJECTION, EMULSION INTRAVENOUS at 08:41

## 2025-06-06 RX ADMIN — CEFAZOLIN 1 G: 1 INJECTION, POWDER, FOR SOLUTION INTRAMUSCULAR; INTRAVENOUS at 08:31

## 2025-06-06 RX ADMIN — ACETAMINOPHEN 650 MG: 325 TABLET ORAL at 10:09

## 2025-06-06 RX ADMIN — METOCLOPRAMIDE 10 MG: 5 INJECTION, SOLUTION INTRAMUSCULAR; INTRAVENOUS at 09:57

## 2025-06-06 RX ADMIN — ROCURONIUM BROMIDE 5 MG: 10 INJECTION INTRAVENOUS at 08:25

## 2025-06-06 RX ADMIN — PROPOFOL 80 MG: 10 INJECTION, EMULSION INTRAVENOUS at 08:25

## 2025-06-06 RX ADMIN — FENTANYL CITRATE 25 MCG: 50 INJECTION, SOLUTION INTRAMUSCULAR; INTRAVENOUS at 08:24

## 2025-06-06 RX ADMIN — LIDOCAINE HYDROCHLORIDE 40 MG: 20 INJECTION INTRAVENOUS at 08:24

## 2025-06-06 RX ADMIN — DEXAMETHASONE SODIUM PHOSPHATE 8 MG: 4 INJECTION INTRA-ARTICULAR; INTRALESIONAL; INTRAMUSCULAR; INTRAVENOUS; SOFT TISSUE at 08:36

## 2025-06-06 RX ADMIN — PHENYLEPHRINE HYDROCHLORIDE 120 MCG: 10 INJECTION INTRAVENOUS at 08:35

## 2025-06-06 RX ADMIN — FLUCONAZOLE 200 MG: 400 INJECTION, SOLUTION INTRAVENOUS at 08:40

## 2025-06-06 RX ADMIN — PROPOFOL 100 MCG/KG/MIN: 10 INJECTION, EMULSION INTRAVENOUS at 08:43

## 2025-06-06 RX ADMIN — PROPOFOL 20 MG: 10 INJECTION, EMULSION INTRAVENOUS at 08:35

## 2025-06-06 SDOH — HEALTH STABILITY: MENTAL HEALTH: CURRENT SMOKER: 0

## 2025-06-06 ASSESSMENT — PAIN - FUNCTIONAL ASSESSMENT
PAIN_FUNCTIONAL_ASSESSMENT: 0-10

## 2025-06-06 ASSESSMENT — COLUMBIA-SUICIDE SEVERITY RATING SCALE - C-SSRS
1. IN THE PAST MONTH, HAVE YOU WISHED YOU WERE DEAD OR WISHED YOU COULD GO TO SLEEP AND NOT WAKE UP?: NO
2. HAVE YOU ACTUALLY HAD ANY THOUGHTS OF KILLING YOURSELF?: NO
6. HAVE YOU EVER DONE ANYTHING, STARTED TO DO ANYTHING, OR PREPARED TO DO ANYTHING TO END YOUR LIFE?: NO

## 2025-06-06 ASSESSMENT — PAIN SCALES - GENERAL
PAINLEVEL_OUTOF10: 10 - WORST POSSIBLE PAIN
PAINLEVEL_OUTOF10: 9
PAIN_LEVEL: 2
PAINLEVEL_OUTOF10: 10 - WORST POSSIBLE PAIN
PAINLEVEL_OUTOF10: 6

## 2025-06-06 ASSESSMENT — PAIN DESCRIPTION - DESCRIPTORS: DESCRIPTORS: ACHING

## 2025-06-06 NOTE — PROGRESS NOTES
"NUTRITION Assessment NOTE    Nutrition Assessment     Reason for Visit:  Jaja Bradshaw is a 70 y.o. 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    Nutrition consult for TF recommendations.     Home health will be setup over the weekend,. They will order Tube feeding product and supplies as well as, educate patient on PEG care and TF administration.     MD will put plan in place for labs to be drawn in short interval following initiation of tube feed.    Problem List[1]    Nutrition Significant labs:  Lab Results   Component Value Date/Time    GLUCOSE 98 06/02/2025 1354     06/02/2025 1354    K 2.9 (LL) 06/02/2025 1354    CL 99 06/02/2025 1354    CO2 28 06/02/2025 1354    ANIONGAP 17 06/02/2025 1354    BUN 10 06/02/2025 1354    CREATININE 0.51 06/02/2025 1354    EGFR >90 06/02/2025 1354    CALCIUM 10.4 06/02/2025 1354    ALBUMIN 4.6 06/02/2025 1354    ALKPHOS 51 06/02/2025 1354    PROT 7.0 06/02/2025 1354    AST 23 06/02/2025 1354    BILITOT 1.1 06/02/2025 1354    ALT 16 06/02/2025 1354    MG 1.74 05/02/2025 0753     Lab Results   Component Value Date/Time    VITD25 29 (A) 02/15/2020 0755         Anthropometrics:  Height: 162.6 cm (5' 4.02\")                Weight History:   Daily Weight  06/06/25 : (!) 35.3 kg (77 lb 13.2 oz)  06/04/25 : (!) 38.1 kg (83 lb 15.9 oz)  06/03/25 : (!) 36.5 kg (80 lb 7.5 oz)  06/02/25 : (!) 36.1 kg (79 lb 8 oz)  05/21/25 : (!) 37.2 kg (82 lb)  05/20/25 : (!) 37.6 kg (83 lb)  05/29/25 : (!) 38.6 kg (85 lb)  04/30/25 : (!) 39 kg (85 lb 15.7 oz)  04/15/25 : (!) 39 kg (86 lb)  03/21/25 : (!) 38.9 kg (85 lb 12.8 oz)  03/19/25 : (!) 38.1 kg (84 lb)  02/11/25 : (!) 39.5 kg (87 lb)  01/30/25 : (!) 40.9 kg (90 " lb 2.7 oz)  01/23/25 : (!) 42 kg (92 lb 9.6 oz)  01/21/25 : (!) 42.4 kg (93 lb 6.4 oz)  12/19/24 : (!) 44 kg (97 lb)  12/02/24 : (!) 43.8 kg (96 lb 9.6 oz)  11/01/24 : 47.2 kg (104 lb)    Weight Change %:  Weight History / % Weight Change: 19.4% in 6 months  Significant Weight Loss: Yes  Interpretation of Weight Loss: >10% in 6 months    Nutrition History:  Food and Nutrient History  Food and Nutrient History: xx      Enteral Nutrition History: Goal rate:   Enteral Nutrition Formula/Solution: Isosource 1.5  Method of TF administration: Bolus enteral nutrition feeding, Intermittent enteral nutrition feeding  TF infusion rate: 3.5 cartons, three times day  Feeding Tube Flush: 60 ml H20 before and after each bolus     Current Medications[2]     Nutrition Focused Physical Exam Findings: See 6/4/25 assessment          Estimated Needs:  Weight Used for Equation Calculations: 35.3 kg (77 lb 13.2 oz)    Estimated Energy Needs  Total Energy Estimated Needs in 24 hours (kCal):  (9718-3265)  Energy Estimated Needs per kg Body Weight in 24 hours (kCal/kg):  (35-40)  Estimated Protein Needs  Total Protein Estimated Needs in 24 Hours (g):  (53-63)  Protein Estimated Needs per kg Body Weight in 24 Hours (g/kg):  (1.5-1.8 g/kg)  Estimated Fluid Needs  Total Fluid Estimated Needs in 24 Hours (mL): 1400 mL  Total Fluid Estimated Needs in 24 hours (mL/kg): 40 mL/kg       Nutrition Diagnosis   Malnutrition Diagnosis  Patient has Malnutrition Diagnosis: Yes  Diagnosis Status: Active  Malnutrition Diagnosis: Severe malnutrition related to chronic disease or condition  Related to: SE's of paratracheal mass and cancer treatment, and inadequate intake r/t esophageal dysphagia/esophageal obstruction (food impaction).  As Evidenced by: significant weight loss of 19.4% in 6 months, physical findings per NFPE. and inadequate  r/t dysphagia w/ S/P PEG placement.    Nutrition Diagnosis  Patient has Nutrition Diagnosis: Yes  Diagnosis Status (1):  Active  Nutrition Diagnosis 1: Swallowing difficulty  Related to (1): paratracheal mass (small cell carcinoma) and esophageal dysphagia with food impaction  As Evidenced by (1): inadequate intake d/t esophageal dysphagia w/ food impaction requiring PEG placement.       Nutrition Interventions/Recommendations   Nutrition Prescription: Individualized Nutrition Prescription Provided for : Enteral nutrition     Recommendations: Individualized Nutrition Prescription Provided for : Restore nutrition, fluid and electrolyte reserves. MD made aware of abnormal potassium levels. Provided recommendations to 1. restore potassium with Kphos; and 2. monitor renal panel frequently with anticipation of ongoing potassium, phosphorus and magnesium depletion with potential of refeeding syndrome.    Nutrition Interventions:   Food and Nutrient Delivery: Food and Nutrition Delivery  Enteral Nutrition: Management of composition of enteral nutrition, Management of delivery rate of enteral nutrition  Goals: Pt will tolerate bolus TF/flush at goal rate of 3.5 cans per day w/ 60 ml water flush before and after each feeding AEB no s/s of refeeding syndrome (abn renal panel, SOB or muscle cramping) GI tolerance (no n/v or diarrhea) and weight gain of 1 lb. per week.    To prevent refeeding syndrome:  Recommend Thiamine 100 mg via tube every day x 10 days.   Initiate TF administration as follows:  1/2 carton Isosource 1.5 bolus via PEG tube  4 times/day w/ 60 ml water flush before and after ea feeding.   Recommend renal panel lab 24-48 hrs after TF initiated for monitoring. If noted s/s of refeeding syndrome, I.e. abn renal panel labs, SOB or muscle cramping; then replete lytes and continue TF current rate.   If no s/s of refeeding syndrome then advanced TF bolus to 1 carton Isosource 1.5 TID w/ 60 ml water flush before and after ea feeding. Repeat labs.  Monitor nutrition/hydration parameters and adjusted TF and flush as indicated.    Enteral  Nutrition History: Goal rate:   Enteral Nutrition Formula/Solution: Isosource 1.5  Method of TF administration: Bolus enteral nutrition feeding, Intermittent enteral nutrition feeding  TF infusion rate: 3.5 cartons, three times day  Feeding Tube Flush: 60 ml H20 before and after each bolus    Coordination of Care: Coordination of Nutrition Care by a Nutrition Professional  Collaboration and referral of nutrition care: Collaboration and Referral of Nutrition Care  Goals: RD will work with onocolgy team to ensure best outcomes possible.   Home health will be setup. They will order the tube feeding and tube feeding supplies as well as, educate patient on TF administration.  MD will put plan in place for labs to be drawn in short intervals following initiation of tube feed.    Nutrition Education:   Nutrition Education Content: Nutrition Education Content: Content related nutrition education  Goals: Pt's education for TF administration, and order of TF and supplies will be provided by home health agency.                  Nutrition Monitoring and Evaluation   Food and Nutrient Intake  Monitoring and Evaluation Plan: Enternal and parenternal nutrition intake determination, Fluid intake  Fluid Intake: Estimated fluid intake  Criteria: Monitor labs, urine output, skin turgor and mucus membranes.  Enteral and Parenteral Nutrition Intake Determination: Enteral nutrition formula/solution, Enteral nutrition intake  Criteria: Montor s/s of refeeding syndrome and GI tolerance, renal panel.    Anthropometric measurements  Monitoring and Evaluation Plan: Weight  Body Weight: Measured body weight  Criteria: Weight gain       Nutrition Focused Physical Findings  Monitoring and Evaluation Plan: Muscle, Adipose  Criteria: Improving fat stores  Muscle Finding: Muscle atrophy  Criteria: Improved atrophy         Follow Up:     Time Spent  Prep time on day of patient encounter: 10 minutes  Time spent directly with patient, family or  caregiver: 20 minutes  Additional Time Spent on Patient Care Activities: 10 minutes  Documentation Time: 20 minutes  Other Time Spent: 0 minutes  Total: 60 minutes           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, Disp: 30 tablet, Rfl: 0    aspirin 81 mg EC tablet, Take 1 tablet (81 mg) by mouth once daily., Disp: , Rfl:     cholecalciferol (Vitamin D3) 25 MCG (1000 UT) tablet, Take 2 tablets (2,000 Units) by mouth 5 times a day., Disp: , Rfl:     ibuprofen 600 mg tablet, Take 1 tablet (600 mg) by mouth every 6 hours., Disp: 30 tablet, Rfl: 0    levothyroxine (Synthroid, Levoxyl) 88 mcg tablet, Take 1 tablet (88 mcg) by mouth early in the morning.. Take on an empty stomach at the same time each day, either 30 to 60 minutes prior to breakfast, Disp: 90 tablet, Rfl: 1    nystatin (Mycostatin) 100,000 unit/mL suspension, Swish and swallow 5 mL (500,000 Units) 4 times a day for 10 days., Disp: 200 mL, Rfl: 0    OLANZapine (ZyPREXA) 5 mg  tablet, Take 1 tablet (5 mg) by mouth once daily at bedtime. For 4 days starting the evening of treatment., Disp: 4 tablet, Rfl: 5    ondansetron (Zofran) 8 mg tablet, Take 1 tablet (8 mg) by mouth every 8 hours if needed for nausea or vomiting., Disp: 30 tablet, Rfl: 5    oxyCODONE (Roxicodone) 5 mg immediate release tablet, Take 1 tablet (5 mg) by mouth every 6 hours if needed for severe pain (7 - 10) for up to 5 days., Disp: 5 tablet, Rfl: 0    prochlorperazine (Compazine) 10 mg tablet, Take 1 tablet (10 mg) by mouth every 6 hours if needed for nausea or vomiting., Disp: 30 tablet, Rfl: 5  No current facility-administered medications for this visit.    Facility-Administered Medications Ordered in Other Visits:     HYDROmorphone (Dilaudid) injection 0.5 mg, 0.5 mg, intravenous, q5 min PRN, Zena Woods MD    HYDROmorphone PF (Dilaudid) injection 0.2 mg, 0.2 mg, intravenous, q5 min PRN, Zena Woods MD, 0.2 mg at 06/06/25 0933    lidocaine (Xylocaine) 10 mg/mL (1 %) injection 0.1 mL, 0.1 mL, subcutaneous, Once, Zena Woods MD    meperidine PF (Demerol) injection 12.5 mg, 12.5 mg, intravenous, q10 min PRN, Zena Woods MD

## 2025-06-06 NOTE — DISCHARGE INSTRUCTIONS
1) Diet: You may resume your regular home diet   2) Activity: Move around as you are able  3) Wound care: Okay to shower 48h after surgery. Allow warm, soapy water to run over your incisions and pat dry with a clean towel. Do not submerge your incisions in a tub bath or body of water for two weeks after your operation.  4) Medications: Medications: Take tylenol 650mg and ibuprofen 600mg every 6 hours for pain, alternating each with 3 hours between doses. If you still have pain, take oxycodone, one tab every 6-8 hours as needed. You will be discharged with nystatin swish and swallow for 10 days for esophageal candidiasis. Please take this as instructed.  5) Call if: you have a fever > 101.5F, are vomiting, you are concerned your gastrostomy tube has become dislodged, or your pain cannot be controlled with oral medications.    PEG instructions:  -May use PEG for meds/flushes immediately  -May start tube feeds in 3 hours  -Cleanse daily, apply dry dressing ABOVE bumper only  -Ok to continue ASA  - Please page with questions or concerns.

## 2025-06-06 NOTE — INTERVAL H&P NOTE
Patient to OR today for EGD, dilation, PEG placement, possible removal of foreign objects with Dr. Gonzalez.    - Progress notes from from 6/2, 5/20 reviewed. Patient now s/p infusions 6/2-6/4.  - The risks, benefits, indications, contraindications, and surgical alternatives were explained to the patient. Specifically, the risks of surgery, including bleeding, infection, injury to nearby organs or structures, need for additional surgery or procedures, wound complications, and complications of anesthesia.  Commonly associated risks of the procedure were also discussed with the patient in detail. The patient participated in the discussion and was given an opportunity to ask questions. All questions were answered to the patient's verbal satisfaction.    - Written and informed consent signed and is located in the patient's chart.   - NPO since midnight.    Conrado Michelle MD  PGY-2 General Surgery

## 2025-06-06 NOTE — OP NOTE
EGD, WITH PEG TUBE INSERTION Operative Note     Date: 2025  OR Location: [facility] OR    Name: Jaja Bradshaw, : 1955, Age: 70 y.o., MRN: [id number], Sex: female    Diagnosis  Pre-op Diagnosis      * Esophageal obstruction due to food impaction [T18.128A, W44.F3XA]     * Esophageal dysphagia [R13.19] Post-op Diagnosis     * Esophageal obstruction due to food impaction [T18.128A, W44.F3XA]     * Esophageal dysphagia [R13.19]     Procedures  EGD, WITH PEG TUBE INSERTION  19463 - TN EGD PERCUTANEOUS PLACEMENT GASTROSTOMY TUBE      Surgeons      * Vickie Gonzalez - Primary    Resident/Fellow/Other Assistant:  Surgeons and Role:     * Conrado Michelle MD - Resident - Assisting    Staff:   Circulator: Beth Harrell Person: Marely  Circulator: Gabrielle    Anesthesia Staff: Anesthesiologist: Zena Woods MD  CRNA: SARAH Murcia-CRNA  SRNA: Clarisa Church    Procedure Summary  Anesthesia: General  ASA: III  Estimated Blood Loss: 5mL  Intra-op Medications:   Administrations occurring from 0800 to 0910 on 25:   Medication Name Total Dose   sodium chloride 0.9 % irrigation solution 1,000 mL   surgical lubricant gel 1 Application   ceFAZolin (Ancef) vial 1 g 1 g   dexAMETHasone (Decadron) 4 mg/mL IV Syringe 2 mL 8 mg   fentaNYL (Sublimaze) injection 50 mcg/mL 25 mcg   fluconazole (Diflucan) IVPB 400 mg in 200 mL NS (premix) 200 mg   LR infusion Cannot be calculated   lidocaine (cardiac) injection 2% prefilled syringe 40 mg   midazolam PF (Versed) injection 1 mg/mL 2 mg   ondansetron (Zofran) 2 mg/mL injection 4 mg   phenylephrine (Michael-Synephrine) injection 280 mcg   propofol (Diprivan) injection 10 mg/mL 150.9 mg   rocuronium (ZeMuron) 50 mg/5 mL injection 20 mg   succinylcholine (Anectine) 20 mg/mL injection 80 mg   sugammadex (Bridion) 200 mg/2 mL injection 200 mg              Anesthesia Record               Intraprocedure I/O Totals       None           Specimen: No specimens collected               Drains and/or Catheters:   Gastrostomy/Enterostomy Percutaneous endoscopic gastrostomy (PEG) LLQ (Active)       Tourniquet Times:         Implants:     Findings: esophagitis and thrush without obstruction    Indications: Jaja Bradshaw is an 70 y.o. female who is having surgery for Esophageal obstruction due to food impaction [T18.128A, W44.F3XA]  Esophageal dysphagia [R13.19]. She has had progressive dysphagia, had a steak earlier this week and has had significant trouble swallowing since and basically unable to get anything down. In the setting of her recent small cell carcinoma dx and myopathy/neuropathy these may be playing a role. We discussed endoscopic evaluation and feeding tube placement as well given her ongoing weight loss.     The patient was seen in the preoperative area. The risks, benefits, complications, treatment options, non-operative alternatives, expected recovery and outcomes were discussed with the patient. The possibilities of reaction to medication, pulmonary aspiration, injury to surrounding structures, bleeding, recurrent infection, the need for additional procedures, failure to diagnose a condition, and creating a complication requiring transfusion or operation were discussed with the patient. The patient concurred with the proposed plan, giving informed consent.  The site of surgery was properly noted/marked if necessary per policy. The patient has been actively warmed in preoperative area. Preoperative antibiotics have been ordered and given within 1 hours of incision. Venous thrombosis prophylaxis have been ordered including bilateral sequential compression devices    Procedure Details: Patient was brought into the operating suite procedural information was confirmed.  General anesthesia was induced and a single-lumen endotracheal tube was placed.  The endoscope was inserted the proximal mid esophagus were normal, distally patient had evidence of thrush with some mild  esophagitis extending down to the level of the gastroesophageal junction.  There was no evidence of obstruction or retained food bolus.  The stomach was insufflated and overall normal-appearing, the pylorus was patent and the proximal duodenum was normal as well.  The endoscope was pulled back into the stomach and we located a site with one-to-one indentation and transillumination for a PEG tube.  Needle was inserted followed by the wire; Endoscopically using Seldinger technique we snared the wire and the endoscope was removed and the PEG tube was then pulled into the stomach and secured at approximately 2 cm at the level of the skin.  There was easy rotation of the tube and no evidence of bleeding.  The stomach was decompressed and the endoscope was removed.  Patient was then extubated and taken to PACU in stable condition.    Evidence of Infection: No   Complications:  None; patient tolerated the procedure well.    Disposition: PACU - hemodynamically stable.  Condition: stable                 Additional Details: n/a    Attending Attestation: I was present and scrubbed for the entire procedure.    Vickie Gonzalez  Phone Number: [phone]

## 2025-06-06 NOTE — ANESTHESIA POSTPROCEDURE EVALUATION
Patient: Jaja Bradshaw    Procedure Summary       Date: 06/06/25 Room / Location: [facility] OR 20 / [facility]    Anesthesia Start: 0819 Anesthesia Stop: 0918    Procedure: EGD, WITH PEG TUBE INSERTION (Abdomen) Diagnosis:       Esophageal obstruction due to food impaction      Esophageal dysphagia      (Esophageal obstruction due to food impaction [T18.128A, W44.F3XA])      (Esophageal dysphagia [R13.19])    Surgeons: Vickie Gonzalez DO Responsible Provider: Zena Woods MD    Anesthesia Type: general ASA Status: 3            Anesthesia Type: general    Vitals Value Taken Time   /87 06/06/25 09:15   Temp 36.5 06/06/25 09:23   Pulse 79 06/06/25 09:19   Resp 19 06/06/25 09:19   SpO2 98 % 06/06/25 09:19   Vitals shown include unfiled device data.    Anesthesia Post Evaluation    Patient location during evaluation: PACU  Patient participation: complete - patient participated  Level of consciousness: awake and alert  Pain score: 2  Pain management: adequate  Airway patency: patent  Cardiovascular status: acceptable  Respiratory status: acceptable  Hydration status: acceptable  Postoperative Nausea and Vomiting: none        There were no known notable events for this encounter.

## 2025-06-06 NOTE — ANESTHESIA PROCEDURE NOTES
Airway  Date/Time: 6/6/2025 8:27 AM  Reason: elective    Airway not difficult    Staffing  Performed: LUIS   Authorized by: Zena Woods MD    Performed by: Clarisa Church  Patient location during procedure: OR    Patient Condition  Indications for airway management: anesthesia  Patient position: sniffing  Planned trial extubation  Sedation level: deep     Final Airway Details   Preoxygenated: yes  Final airway type: endotracheal airway  Successful airway: ETT  Cuffed: yes   Successful intubation technique: direct laryngoscopy  Adjuncts used in placement: intubating stylet  Endotracheal tube insertion site: oral  Blade: Sterling  Blade size: #3  ETT size (mm): 7.0  Cormack-Lehane Classification: grade I - full view of glottis  Placement verified by: capnometry   Measured from: lips  ETT to lips (cm): 21  Ventilation between attempts: none  Number of attempts at approach: 1  Number of other approaches attempted: 0    Additional Comments  Dentition and soft tissue as preop following intubation, neck remained neutral throughout.

## 2025-06-06 NOTE — TELEPHONE ENCOUNTER
Spoke with Nicky with  Home Healthcare at [phone] regarding recent referral to home care to initiate and manage tube feedings. Referral order successfully faxed to [facility] using provided number,  [phone]. Per Nicky, the patient will be contacted within 24-48 hours. Spoke with patient's , Gurjit Bradshaw and update provided.

## 2025-06-06 NOTE — ANESTHESIA PREPROCEDURE EVALUATION
Patient: Jaja Bradshaw    Procedure Information       Anesthesia Start Date/Time: 25 0819    Procedures:       EGD, WITH DILATION (Abdomen) - EGD  possible foreign body remova, possible dilation      EGD, WITH PEG TUBE INSERTION (Abdomen)    Location: [facility] OR 20 / Virtual [facility] OR    Surgeons: Vickie Gonzalez, DO            Relevant Problems   Anesthesia   (+) PONV (postoperative nausea and vomiting)      Cardiac   (+) Abnormal ECG   (+) Benign essential hypertension   (+) Hyperlipidemia   (+) Hypertension      Pulmonary   (+) Chronic obstructive pulmonary disease (Multi)      GI   (+) Dysphagia      Endocrine   (+) Hypothyroidism      Musculoskeletal   (+) Chronic pain syndrome       Clinical information reviewed:   Tobacco  Allergies  Meds   Med Hx  Surg Hx  OB Status  Fam Hx  Soc   Hx        NPO Detail:  NPO/Void Status  Carbohydrate Drink Given Prior to Surgery? : N  Date of Last Liquid: 25  Time of Last Liquid: 0000  Date of Last Solid: 25  Time of Last Solid: 0000  Last Intake Type: Clear fluids  Time of Last Void: 0000         Physical Exam    Airway  Mallampati: II  Mouth openin finger widths     Cardiovascular   Rhythm: regular  Rate: normal     Dental - normal exam     Pulmonary Breath sounds clear to auscultation     Abdominal            Anesthesia Plan    History of general anesthesia?: yes  History of complications of general anesthesia?: no    ASA 3     general     The patient is not a current smoker.    Anesthetic plan and risks discussed with patient.    Plan discussed with CRNA.

## 2025-06-06 NOTE — HH CARE COORDINATION
This referral has been made a Non Admit with  Home Care due to TF vendor/TF supplies not established. If you have further questions, feel free to reach out to our office at [phone]. Thank you, [facility] Intake.    Provider Notification:  Sent via EPIC secure chat to Heydi Marcum PA-C informing that [facility] has not accepted patient referral.

## 2025-06-09 NOTE — PROGRESS NOTES
06/09/25 1549   Discharge Planning   Living Arrangements Spouse/significant other   Support Systems Spouse/significant other   Type of Residence Private residence   Who is requesting discharge planning? Provider   Expected Discharge Disposition Home H   Does the patient need discharge transport arranged? No     Patient will need enteral nutrition for discharge planning.  Referral has been placed with [facility].  Supplies to be shipped this evening.  Waiting for an accepting [facility] agency.  Call placed to patient to update on DME and delivery.     6/12/2025.  Cinfirmed delivery of enteral nutrition for 6/11.  [facility]    [phone] has confirmed the SOC for 6/13.

## 2025-06-10 ENCOUNTER — TELEMEDICINE (OUTPATIENT)
Dept: HEMATOLOGY/ONCOLOGY | Facility: CLINIC | Age: 70
End: 2025-06-10
Payer: MEDICARE

## 2025-06-10 DIAGNOSIS — C77.9 METASTATIC SMALL CELL CARCINOMA TO LYMPH NODE: Primary | ICD-10-CM

## 2025-06-10 PROCEDURE — 1159F MED LIST DOCD IN RCRD: CPT | Performed by: NURSE PRACTITIONER

## 2025-06-10 PROCEDURE — 1160F RVW MEDS BY RX/DR IN RCRD: CPT | Performed by: NURSE PRACTITIONER

## 2025-06-10 PROCEDURE — 99212 OFFICE O/P EST SF 10 MIN: CPT | Performed by: NURSE PRACTITIONER

## 2025-06-10 NOTE — PROGRESS NOTES
"Consent:  Verbal consent was requested and obtained from patient on this date for a telehealth visit.      Patient ID: Jaja Bradshaw is a 69 y.o. female    Primary Care Provider: No primary care provider on file.    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 Jaja Bradshaw is a 70 YO with PMH of HTN, hypothyroidism, chronic LE pain seen as initial consultation for [id number] small cell carcinoma of mediastinum.    Patient with ~5 months of progressive, debilitating leg pain and unintentional weight loss. As part of workup a noncon CT was obtained 01/10/2025 demonstrating soft tissue mass in R paratracheal measuring 3 cm. Patient underwent cervical mediastinoscopy 01/30/2025 with biopsy showing lymphoid tissue with rare nonnecrotizing granulomas. Workup continued, she was seen by rheum and evaluated for sarcoidosis. Rheum workup also showed normal ESR/CRP, CCP/RF.     She underwent paratracheal mass excision on 04/30/2025 with pathology demonstrating:    FINAL DIAGNOSIS      A. LYMPH NODE, SUPERIOR VENA CAVA, EXCISION:  - One lymph node with no evidence of carcinoma (0/1).     B. LYMPH NODE, LEVEL 10, EXCISION:  - Focal small cell carcinoma, involving lymph node tissue.      C. LYMPH NODE, ELANA-AORTIC, EXCISION:  - One lymph node with no evidence of carcinoma (0/1).     D. SOFT TISSUE, DESIGNATED \"PARATRACHEAL MASS\", EXCISION:  - Small cell carcinoma. See note.  - Tumor extends focally to disrupted inked parenchymal edge.      Note: H&E sections of this mass demonstrate a well-circumscribed nodule with a rim of lymphoid " tissue and what appears to be lymph node capsule (D2).  Within the nodule there is a population of small round blue cell tumors with the morphology of the small cell carcinoma intermixed with granulomatous inflammation.  By immunohistochemical staining, the tumor cells are positive for CAM 5.2, TTF 1 (SPT24) (weak), INSM1, and chromogranin.  Ki-67 proliferation index is high, approximately 70%.  Retinoblastoma expression is globally lost within the tumor cells.  Tumor cells are negative for AE1/AE3, CD20, CD3, p40, S100, and CD1a.  CD68 highlights intranodal histiocytes.  By morphology and immunohistochemical staining, the findings are favored to represent metastatic small cell carcinoma in enlarged lymph node. Clinical correlation in regards to the primary is recommended. AFB and GMS will be ordered and reported in an addendum.     Of note: The previous lymph node biopsy slides ([id number]) were reviewed, and show no evidence of small cell carcinoma.     She presents for new patient consultation.    PAST MEDICAL HISTORY  HTN, hypothyroidism, chronic LE pain    SURGICAL HISTORY  Tubal ligation c/b hemorrhage requiring 6u blood, paratracheal mass excision     SOCIAL HISTORY  Lives in [place] with  Gurjit of 30 years. Lives with youngest daughter Sandra and 14 year old granddaughter Enid. Retired  in [place]. Heavy fiberglass exposure. Smoked 1 PPD x 50 years. 1-2 beers a day. Enjoys justino.     FAMILY HISTORY  Mother with liver cancer metastatic to lung    CURRENT MEDS REVIEWED  Current Medications[1]       ALLERGIES REVIEWED        SUBJECTIVE:   Patient is seen virtually for toxicity check. She is now status post cycle 1 carboplatin/etoposide. She denies any fevers, chills or night sweats. No cough, chest pain or shortness of breath. No nausea or vomiting. No constipation or diarrhea. Weight loss noted. Dentures no longer fit. Since our last visit she underwent EGD with PEG tube placement  and removal of food impaction. She is eating soft foods or liquids. She is awaiting shipment of PEG tube supplies and tube feed. No urinary symptoms. No rash. No neuropathy hands and feet bilaterally, she is ambulatory around the house scheduled for neurology consult.    Review of Systems:  A review of systems has been completed and are negative for complaints except what is stated in the HPI and/or past medical history      OBJECTIVE:  Vital Signs/Weights/Physical Exam  - telehealth visit    Diagnostic Results   Results:  Labs:  Lab Results   Component Value Date    WBC 9.8 05/02/2025    HGB 10.5 (L) 05/02/2025    HCT 32.3 (L) 05/02/2025     (H) 05/02/2025     05/02/2025      Lab Results   Component Value Date    NEUTROABS 4.10 12/02/2024        Lab Results   Component Value Date    GLUCOSE 90 05/02/2025    CALCIUM 9.7 05/02/2025     05/02/2025    K 3.7 05/02/2025    CO2 29 05/02/2025     05/02/2025    BUN 8 05/02/2025    CREATININE 0.40 (L) 05/02/2025    MG 1.74 05/02/2025     Lab Results   Component Value Date    ALT 21 12/02/2024    AST 39 12/02/2024    ALKPHOS 87 12/02/2024    BILITOT 0.9 12/02/2024      Lab Results   Component Value Date    TSH 0.06 (L) 03/19/2025    FREET4 1.9 (H) 03/19/2025     PET 05/20/2025  IMPRESSION:  Postsurgical changes from prior right paratracheal mass resection  without evidence of hypermetabolic activity within the surgical bed.  Moderate hypermetabolic lymph nodes in the subcarinal region with a  SUVs of up to 4.7, likely representing lymph node metastases. No  evidence of hypermetabolic distant metastases. Esophagus is patulous  and fluid-filled throughout its course, high-risk for aspiration.  Extensive subcutaneous emphysema in the right chest wall extending to  the abdominal wall and proximal right upper extremity.    CT Chest 04/15/2025  IMPRESSION:  1.  Interval increase in size of a soft tissue mass within the right  paratracheal lymph node  station measuring 3.7 x 3.3 x 5.7 cm,  previously measuring 3.2 x 3.2 x 5.0 cm on exam dated 01/10/2025. No  new sites of metastatic disease within the chest.  2. Stable size of a 0.5 cm right upper lobe pulmonary nodule. No new  or enlarging discrete suspicious pulmonary nodules.  3. Moderate emphysema.  4. Severe coronary artery calcifications.      Assessment/Plan     No matching staging information was found for the patient.  Ms. Jaja Bradshaw is a 70 YO with PMH of HTN, hypothyroidism, chronic LE pain seen as initial consultation for [id number] small cell carcinoma of mediastinum.    Patient with ~5 months of progressive, debilitating leg pain and unintentional weight loss. As part of workup a noncon CT was obtained 01/10/2025 demonstrating soft tissue mass in R paratracheal measuring 3 cm. Patient underwent cervical mediastinoscopy 01/30/2025 with biopsy showing lymphoid tissue with rare nonnecrotizing granulomas. Workup continued, she was seen by rheum and evaluated for sarcoidosis. Rheum workup also showed normal ESR/CRP, CCP/RF.     Regarding her cancer: she still has active disease in subcarinal region after resection. I believe she will need 4 cycles of carboplatin/etoposide. If MRI brain is without disease I will refer to rad onc for sequential vs. Concurrent RT. Plan to consolidate with durvalumab per ADRIATIC study    Regarding her weight loss, muscle wasting and myalgias: I am worried about undiagnosed paraneoplastic syndrome (cachectic amyotrophy?). She currently has neurology consultation in November, I will work to expedite this.    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 Myopathy requested per neurology   -New PCP requested. Unable to see NP PCP given her insurance  - Will begin tube feeds once supplies arrive.     #Dysphagia  She underwent barium swallow finding consistent with esophageal obstruction with retained food.  She is scheduled for outpatient EGD with possible dilatation, foreign body removal and PEG tube placement.  She is working with our dietitian.  06/10/2025 PEG tube now in place    SARAH Webster-CNP  [facility]  Thoracic + H&N Medical Oncology     I spent a total of 30+ minutes on the date of the service which included preparing to see the patient, face-to-face patient care, completing clinical documentation, obtaining and / or reviewing separately obtained history, counseling and educating the patient/family/caregiver, ordering medications, tests, or procedures, communicating with other healthcare providers (not separately reported), independently interpreting results, not separately reported, and communicating results to the patient/family/caregiver, Name and date of birth verified.            [1]   Current Outpatient Medications:     acetaminophen (Tylenol) 500 mg tablet, Take 2 tablets (1,000 mg) by mouth every 8 hours if needed for mild pain (1 - 3) or moderate pain (4 - 6)., Disp: 30 tablet, Rfl: 0    aspirin 81 mg EC tablet, Take 1 tablet (81 mg) by mouth once daily., Disp: , Rfl:     cholecalciferol (Vitamin D3) 25 MCG (1000 UT) tablet, Take 2 tablets (2,000 Units) by mouth 5 times a day., Disp: , Rfl:     ibuprofen 600 mg tablet, Take 1 tablet (600 mg) by mouth every 6 hours., Disp: 30 tablet, Rfl: 0    levothyroxine (Synthroid, Levoxyl) 88 mcg tablet, Take 1 tablet (88 mcg) by mouth early in the morning.. Take on an empty stomach at the same time each day, either 30 to 60 minutes prior to breakfast, Disp: 90  tablet, Rfl: 1    nystatin (Mycostatin) 100,000 unit/mL suspension, Swish and swallow 5 mL (500,000 Units) 4 times a day for 10 days., Disp: 200 mL, Rfl: 0    OLANZapine (ZyPREXA) 5 mg tablet, Take 1 tablet (5 mg) by mouth once daily at bedtime. For 4 days starting the evening of treatment., Disp: 4 tablet, Rfl: 5    ondansetron (Zofran) 8 mg tablet, Take 1 tablet (8 mg) by mouth every 8 hours if needed for nausea or vomiting., Disp: 30 tablet, Rfl: 5    oxyCODONE (Roxicodone) 5 mg immediate release tablet, Take 1 tablet (5 mg) by mouth every 6 hours if needed for severe pain (7 - 10) for up to 5 days., Disp: 5 tablet, Rfl: 0    prochlorperazine (Compazine) 10 mg tablet, Take 1 tablet (10 mg) by mouth every 6 hours if needed for nausea or vomiting., Disp: 30 tablet, Rfl: 5

## 2025-06-13 ENCOUNTER — APPOINTMENT (OUTPATIENT)
Dept: RADIOLOGY | Facility: HOSPITAL | Age: 70
End: 2025-06-13
Payer: MEDICARE

## 2025-06-17 ENCOUNTER — APPOINTMENT (OUTPATIENT)
Dept: NEUROLOGY | Facility: CLINIC | Age: 70
End: 2025-06-17
Payer: MEDICARE

## 2025-06-17 ENCOUNTER — HOSPITAL ENCOUNTER (OUTPATIENT)
Dept: RADIOLOGY | Facility: CLINIC | Age: 70
Discharge: HOME | End: 2025-06-17
Payer: MEDICARE

## 2025-06-17 ENCOUNTER — OFFICE VISIT (OUTPATIENT)
Dept: SURGERY | Facility: CLINIC | Age: 70
End: 2025-06-17
Payer: MEDICARE

## 2025-06-17 VITALS
BODY MASS INDEX: 13.83 KG/M2 | OXYGEN SATURATION: 98 % | TEMPERATURE: 96.8 F | SYSTOLIC BLOOD PRESSURE: 133 MMHG | DIASTOLIC BLOOD PRESSURE: 80 MMHG | HEIGHT: 64 IN | HEART RATE: 90 BPM | WEIGHT: 81 LBS

## 2025-06-17 DIAGNOSIS — D38.3 NEOPLASM OF UNCERTAIN BEHAVIOR OF MEDIASTINUM: ICD-10-CM

## 2025-06-17 DIAGNOSIS — C77.9 METASTATIC SMALL CELL CARCINOMA TO LYMPH NODE: Primary | ICD-10-CM

## 2025-06-17 DIAGNOSIS — C77.9 METASTATIC SMALL CELL CARCINOMA TO LYMPH NODE: ICD-10-CM

## 2025-06-17 DIAGNOSIS — B37.81 ESOPHAGEAL CANDIDIASIS (MULTI): ICD-10-CM

## 2025-06-17 PROCEDURE — 71046 X-RAY EXAM CHEST 2 VIEWS: CPT

## 2025-06-17 PROCEDURE — 99211 OFF/OP EST MAY X REQ PHY/QHP: CPT | Performed by: STUDENT IN AN ORGANIZED HEALTH CARE EDUCATION/TRAINING PROGRAM

## 2025-06-17 PROCEDURE — 3075F SYST BP GE 130 - 139MM HG: CPT | Performed by: STUDENT IN AN ORGANIZED HEALTH CARE EDUCATION/TRAINING PROGRAM

## 2025-06-17 PROCEDURE — 71046 X-RAY EXAM CHEST 2 VIEWS: CPT | Performed by: RADIOLOGY

## 2025-06-17 PROCEDURE — 1125F AMNT PAIN NOTED PAIN PRSNT: CPT | Performed by: STUDENT IN AN ORGANIZED HEALTH CARE EDUCATION/TRAINING PROGRAM

## 2025-06-17 PROCEDURE — 1159F MED LIST DOCD IN RCRD: CPT | Performed by: STUDENT IN AN ORGANIZED HEALTH CARE EDUCATION/TRAINING PROGRAM

## 2025-06-17 PROCEDURE — 3008F BODY MASS INDEX DOCD: CPT | Performed by: STUDENT IN AN ORGANIZED HEALTH CARE EDUCATION/TRAINING PROGRAM

## 2025-06-17 PROCEDURE — 3079F DIAST BP 80-89 MM HG: CPT | Performed by: STUDENT IN AN ORGANIZED HEALTH CARE EDUCATION/TRAINING PROGRAM

## 2025-06-17 RX ORDER — NYSTATIN 100000 [USP'U]/ML
5 SUSPENSION ORAL 4 TIMES DAILY
Qty: 200 ML | Refills: 2 | Status: SHIPPED | OUTPATIENT
Start: 2025-06-17 | End: 2025-07-17

## 2025-06-17 RX ORDER — IBUPROFEN 800 MG/1
800 TABLET, FILM COATED ORAL EVERY 8 HOURS PRN
Qty: 90 TABLET | Refills: 2 | Status: SHIPPED | OUTPATIENT
Start: 2025-06-17 | End: 2025-09-15

## 2025-06-17 ASSESSMENT — PAIN SCALES - GENERAL: PAINLEVEL_OUTOF10: 10-WORST PAIN EVER

## 2025-06-17 NOTE — PROGRESS NOTES
Chief complaint:  Post-op    History Of Present Illness  Jaja Bradshaw is a 70 y.o. female who is here for her first post-op visit after an EGD and PEG tube placement on 6/6/25. She is s/p a robotic right paratracheal mass resection on 4/30/25 for a [id number] small cell. She is undergoing chemo infusions and tolerating well overall.    She had a large amount of drainage the afternoon after her PEG but no additional imaging since then. Minimal pain. Tolerating TF but still at half dose x4/day. Larger amounts cause nausea and bloating.  Dysphagia has improved somewhat on Nystatin swish/swallow    By way of review patient was referred by her PCP, Fide Alonzo.Patient reports she had been having several generalized issues including diffuse muscle aches which started in her legs but have started to occur all over. She has also had ~30lb weight loss over the last 3-4 months. She does admit that she previously had dentures fitted which she cannot use secondary to discomfort and increased size. She also had previously lost her sense of taste.  Underwent a cervical mediastinoscopy for a paratracheal lesion, initial biopsies showed non-necrotizing granulomas only however ongoing growth of lesion prompted complete resection    No history of MI or CVA. Could walk a mile or climb 2 flights of stairs: yes     Past Medical History  She has a past medical history of COPD (chronic obstructive pulmonary disease) (Multi), Disease of thyroid gland, and PONV (postoperative nausea and vomiting).    Surgical History  She has a past surgical history that includes Other surgical history (04/17/2020); Mediastinotomy (N/A, 01/30/2025); and Esophagogastroduodenoscopy w/ PEG (N/A, 06/06/2025).     Social History  She reports that she has been smoking cigarettes. She started smoking about 45 years ago. She has a 22.7 pack-year smoking history. She has been exposed to tobacco smoke. She has never used smokeless tobacco. She reports current  alcohol use of about 8.0 standard drinks of alcohol per week. She reports that she does not use drugs.    Family History  Family history of cancer: Yes  Family History   Problem Relation Name Age of Onset    Liver cancer Mother          Medications    Current Outpatient Medications:     acetaminophen (Tylenol) 500 mg tablet, Take 2 tablets (1,000 mg) by mouth every 8 hours if needed for mild pain (1 - 3) or moderate pain (4 - 6)., Disp: 30 tablet, Rfl: 0    aspirin 81 mg EC tablet, Take 1 tablet (81 mg) by mouth once daily., Disp: , Rfl:     cholecalciferol (Vitamin D3) 25 MCG (1000 UT) tablet, Take 2 tablets (2,000 Units) by mouth 5 times a day., Disp: , Rfl:     ibuprofen 600 mg tablet, Take 1 tablet (600 mg) by mouth every 6 hours., Disp: 30 tablet, Rfl: 0    levothyroxine (Synthroid, Levoxyl) 88 mcg tablet, Take 1 tablet (88 mcg) by mouth early in the morning.. Take on an empty stomach at the same time each day, either 30 to 60 minutes prior to breakfast, Disp: 90 tablet, Rfl: 1    OLANZapine (ZyPREXA) 5 mg tablet, Take 1 tablet (5 mg) by mouth once daily at bedtime. For 4 days starting the evening of treatment., Disp: 4 tablet, Rfl: 5    ondansetron (Zofran) 8 mg tablet, Take 1 tablet (8 mg) by mouth every 8 hours if needed for nausea or vomiting., Disp: 30 tablet, Rfl: 5    prochlorperazine (Compazine) 10 mg tablet, Take 1 tablet (10 mg) by mouth every 6 hours if needed for nausea or vomiting., Disp: 30 tablet, Rfl: 5    ibuprofen 800 mg tablet, Take 1 tablet (800 mg) by mouth every 8 hours if needed for mild pain (1 - 3)., Disp: 90 tablet, Rfl: 2    nystatin (Mycostatin) 100,000 unit/mL suspension, Swish and swallow 5 mL (500,000 Units) 4 times a day., Disp: 200 mL, Rfl: 2    Allergies  Latex and Codeine    Review of Systems:  Review of Systems   Constitutional: No fevers, chills, unexpected weight change  HENT: No sore throat, congestion, or nasal drainage  Eyes: No visual changes or eye  "itching  Respiratory:  see HPI. No cough, worsening dyspnea, wheezing  Cardiac: No chest pain, palpitations, or lower extremity edema  Gastrointestinal: No nausea, vomiting, diarrhea. No abdominal pain  Genitourinary: No dysuria or hematuria  Musculoskeletal: No back pain. No significant myalgias or arthralgias  Neurologic: No headaches, dizziness, or seizures.  Hematologic: No easy bleeding or bruising.  Psychiatric: No anxiety or depression.    Physical Exam:  Physical Exam  /80   Pulse 90   Temp 36 °C (96.8 °F) (Temporal)   Ht 1.626 m (5' 4\")   Wt (!) 36.7 kg (81 lb)   LMP  (LMP Unknown)   SpO2 98%   BMI 13.90 kg/m²   Constitutional:       General: Patient is not in acute distress.     Appearance: Normal appearance; not ill-appearing.   HENT:      Head: Normocephalic.      Nose: No congestion or rhinorrhea.   Neck: Cervical mediastinoscopy site C/D/I  Cardiovascular:      Rate and Rhythm: Normal rate and regular rhythm.      Pulses: Normal pulses.   Pulmonary:      Effort: Pulmonary effort is normal. No respiratory distress.  No conversational dyspnea     Breath sounds: No stridor. No wheezing.   Abdominal:      General: There is no distension. PEG site well appearing.      Palpations: Abdomen is soft.      Tenderness: There is no abdominal tenderness.   Musculoskeletal:         General: No swelling, tenderness or deformity. Normal range of motion.      Cervical back: Normal range of motion. No rigidity.   Lymphadenopathy:      Cervical: No cervical adenopathy.   Skin:     General: Skin is warm and dry.   Neurological:      General: No focal deficit present.      Mental Status: Patient is alert and oriented to person, place, and time.   Psychiatric:         Mood and Affect: Mood normal.     Relevant Results    Pathology:Surgical Pathology Exam: [id number]  Order: [id number]   Collected 4/30/2025 09:17       Status: Edited Result - FINAL       Dx: Mediastinal mass    Test Result Released: Yes (not " "seen)    0 Result Notes       View Follow-Up Encounter      Component    FINAL DIAGNOSIS      A. LYMPH NODE, SUPERIOR VENA CAVA, EXCISION:  - One lymph node with no evidence of carcinoma (0/1).     B. LYMPH NODE, LEVEL 10, EXCISION:  - Focal small cell carcinoma, involving lymph node tissue.      C. LYMPH NODE, ELANA-AORTIC, EXCISION:  - One lymph node with no evidence of carcinoma (0/1).     D. SOFT TISSUE, DESIGNATED \"PARATRACHEAL MASS\", EXCISION:  - Small cell carcinoma. See note.  - Tumor extends focally to disrupted inked parenchymal edge.      Note: H&E sections of this mass demonstrate a well-circumscribed nodule with a rim of lymphoid tissue and what appears to be lymph node capsule (D2).  Within the nodule there is a population of small round blue cell tumors with the morphology of the small cell carcinoma intermixed with granulomatous inflammation.  By immunohistochemical staining, the tumor cells are positive for CAM 5.2, TTF 1 (SPT24) (weak), INSM1, and chromogranin.  Ki-67 proliferation index is high, approximately 70%.  Retinoblastoma expression is globally lost within the tumor cells.  Tumor cells are negative for AE1/AE3, CD20, CD3, p40, S100, and CD1a.  CD68 highlights intranodal histiocytes.  By morphology and immunohistochemical staining, the findings are favored to represent metastatic small cell carcinoma in enlarged lymph node. Clinical correlation in regards to the primary is recommended. AFB and GMS will be ordered and reported in an addendum.     Of note: The previous lymph node biopsy slides ([id number]) were reviewed, and show no evidence of small cell carcinoma.     Staff consultants: Nirali Contreras, Raghu Siegel.   Electronically signed by Lisseth Griffith DO on 5/16/2025 at 1321 EDT        By the signature on this report, the individual or group listed as making the Final Interpretation/Diagnosis certifies that they have reviewed this case.    Addendum   AFB and GMS, performed on block " "D3, are negative for acid-fast bacilli and fungal organisms, respectively.      Addendum electronically signed by Lisseth Griffith DO on 5/27/2025 at 1514 EDT   Clinical History    Pre-op diagnosis:  Mediastinal mass [J98.59]   Gross Description    A: Received in formalin, labeled with the patient's name and hospital number and \"superior vena cava *\", is a segment of adipose tissue with possible scant grey-black lymph node tissue measuring 0.8 x 0.5 x 0.1 cm.  The specimen is submitted in toto in one cassette.  JLL  B: Received in formalin, labeled with the patient's name and hospital number and \"level 10 lymph node\", are 3 grey-black lymph node fragments aggregating to 2.0 x 0.8 x 0.3 cm.  The specimen is submitted in toto in one cassette.  JLL  C: Received in formalin, labeled with the patient's name and hospital number and \"abdulkadir-aortic lymph node\", is a grey-black lymph node measuring 0.6 x 0.5 x 0.3 cm.  The specimen is bisected and submitted entirely in one cassette.  JLL  D: Received in formalin, labeled with the patient's name and hospital number and \"paratracheal mass\", is an encapsulated soft tissue mass measuring 5.3 x 3.5 x 3.3 cm and weighing 36.2 grams.  A defect is present and is inked orange.  The specimen is inked blue and sectioned to reveal pale tan to pink and focally chalky cut surfaces.  Representative sections are submitted in 5 cassettes.  JL   Disclaimer    One or more of the reagents used to perform assays on this specimen MAY have contained components considered to be analyte specific reagents (ASR's).  ASR's have not been cleared or approved by the U.S. Food and Drug Administration.  These assays were developed and their performance characteristics determined by the Department of Pathology at [facility]. The FDA does not require this test to go through premarket FDA review. This test is used for clinical purposes. It should not be regarded as " "investigational or for research. This laboratory is certified under the Clinical Laboratory Improvement Amendments (CLIA) as qualified to perform high complexity clinical laboratory testing.  The assays were performed with appropriate positive and negative controls which stained appropriately.   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 Bradshaw is a 70 year old female, hx uRuY2Ct small cell carcinoma, who presents s/p PEG and EGD. Tolerating low rate feeds, weight stable- slowly increasing. Undergoing systemic treatment with Med Onc. Has appt to see Neuro today for EMG. New rx for Ibuprofen. She can call me with any questions or concerns.       Vickie Gonzalez, DO  Thoracic & Esophageal Surgery     "

## 2025-06-18 ENCOUNTER — APPOINTMENT (OUTPATIENT)
Dept: PALLIATIVE MEDICINE | Facility: CLINIC | Age: 70
End: 2025-06-18
Payer: MEDICARE

## 2025-06-20 ENCOUNTER — APPOINTMENT (OUTPATIENT)
Dept: PRIMARY CARE | Facility: CLINIC | Age: 70
End: 2025-06-20
Payer: MEDICARE

## 2025-06-23 ENCOUNTER — TELEPHONE (OUTPATIENT)
Dept: HEMATOLOGY/ONCOLOGY | Facility: CLINIC | Age: 70
End: 2025-06-23
Payer: MEDICARE

## 2025-06-23 NOTE — TELEPHONE ENCOUNTER
Called patient to confirm she's aware of appointment time changes tomorrow. She now has a noon virtual visit with Kaiden and treatment at 12:30, still at [facility].    She did not answer, but left appointment information on her voicemail. Also provided [facility] phone number for call back, if needed.

## 2025-06-24 ENCOUNTER — APPOINTMENT (OUTPATIENT)
Dept: LAB | Facility: HOSPITAL | Age: 70
End: 2025-06-24
Payer: MEDICARE

## 2025-06-24 ENCOUNTER — APPOINTMENT (OUTPATIENT)
Dept: HEMATOLOGY/ONCOLOGY | Facility: CLINIC | Age: 70
End: 2025-06-24
Payer: MEDICARE

## 2025-06-24 ENCOUNTER — TELEMEDICINE (OUTPATIENT)
Dept: HEMATOLOGY/ONCOLOGY | Facility: CLINIC | Age: 70
End: 2025-06-24
Payer: MEDICARE

## 2025-06-24 DIAGNOSIS — D70.1 CHEMOTHERAPY INDUCED NEUTROPENIA: ICD-10-CM

## 2025-06-24 DIAGNOSIS — T45.1X5A CHEMOTHERAPY INDUCED NEUTROPENIA: ICD-10-CM

## 2025-06-24 DIAGNOSIS — C77.9 METASTATIC SMALL CELL CARCINOMA TO LYMPH NODE: Primary | ICD-10-CM

## 2025-06-24 DIAGNOSIS — R63.4 WEIGHT LOSS: ICD-10-CM

## 2025-06-24 LAB
ALBUMIN SERPL BCP-MCNC: 4.5 G/DL (ref 3.4–5)
ALP SERPL-CCNC: 61 U/L (ref 33–136)
ALT SERPL W P-5'-P-CCNC: 14 U/L (ref 7–45)
ANION GAP SERPL CALC-SCNC: 13 MMOL/L (ref 10–20)
AST SERPL W P-5'-P-CCNC: 19 U/L (ref 9–39)
BASOPHILS # BLD AUTO: ABNORMAL 10*3/UL
BASOPHILS NFR BLD AUTO: 1.3 %
BILIRUB SERPL-MCNC: 0.5 MG/DL (ref 0–1.2)
BUN SERPL-MCNC: 6 MG/DL (ref 6–23)
CALCIUM SERPL-MCNC: 10.3 MG/DL (ref 8.6–10.3)
CHLORIDE SERPL-SCNC: 99 MMOL/L (ref 98–107)
CO2 SERPL-SCNC: 30 MMOL/L (ref 21–32)
CREAT SERPL-MCNC: 0.54 MG/DL (ref 0.5–1.05)
EGFRCR SERPLBLD CKD-EPI 2021: >90 ML/MIN/1.73M*2
EOSINOPHIL # BLD AUTO: 0.01 X10*3/UL (ref 0–0.7)
EOSINOPHIL NFR BLD AUTO: 0.3 %
ERYTHROCYTE [DISTWIDTH] IN BLOOD BY AUTOMATED COUNT: 13.1 % (ref 11.5–14.5)
GLUCOSE SERPL-MCNC: 91 MG/DL (ref 74–99)
HCT VFR BLD AUTO: 32.6 % (ref 36–46)
HGB BLD-MCNC: 10.6 G/DL (ref 12–16)
IMM GRANULOCYTES # BLD AUTO: 0.01 X10*3/UL (ref 0–0.7)
IMM GRANULOCYTES NFR BLD AUTO: 0.3 % (ref 0–0.9)
LDH SERPL L TO P-CCNC: 180 U/L (ref 84–246)
LYMPHOCYTES # BLD AUTO: 2.2 X10*3/UL (ref 1.2–4.8)
LYMPHOCYTES NFR BLD AUTO: 55.7 %
MCH RBC QN AUTO: 34.1 PG (ref 26–34)
MCHC RBC AUTO-ENTMCNC: 32.5 G/DL (ref 32–36)
MCV RBC AUTO: 105 FL (ref 80–100)
MONOCYTES # BLD AUTO: 0.8 X10*3/UL (ref 0.1–1)
MONOCYTES NFR BLD AUTO: 20.3 %
NEUTROPHILS # BLD AUTO: 0.88 X10*3/UL (ref 1.2–7.7)
NEUTROPHILS NFR BLD AUTO: 22.1 %
NRBC BLD-RTO: 0 /100 WBCS (ref 0–0)
PLATELET # BLD AUTO: 298 X10*3/UL (ref 150–450)
POTASSIUM SERPL-SCNC: 4.6 MMOL/L (ref 3.5–5.3)
PROT SERPL-MCNC: 6.8 G/DL (ref 6.4–8.2)
RBC # BLD AUTO: 3.11 X10*6/UL (ref 4–5.2)
RBC MORPH BLD: NORMAL
SODIUM SERPL-SCNC: 137 MMOL/L (ref 136–145)
WBC # BLD AUTO: 4 X10*3/UL (ref 4.4–11.3)

## 2025-06-24 PROCEDURE — 83615 LACTATE (LD) (LDH) ENZYME: CPT

## 2025-06-24 PROCEDURE — 99213 OFFICE O/P EST LOW 20 MIN: CPT | Performed by: NURSE PRACTITIONER

## 2025-06-24 PROCEDURE — 85025 COMPLETE CBC W/AUTO DIFF WBC: CPT

## 2025-06-24 PROCEDURE — 80053 COMPREHEN METABOLIC PANEL: CPT

## 2025-06-24 NOTE — PROGRESS NOTES
"Consent:  Verbal consent was requested and obtained from patient on this date for a telehealth visit.  Patient was out getting labs drawn at appointment time and video visit could not be completed.     Patient ID: Jaja Bradshaw is a 69 y.o. female    Primary Care Provider: No primary care provider on file.    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 Jaja Bradshaw is a 70 YO with PMH of HTN, hypothyroidism, chronic LE pain seen as initial consultation for [id number] small cell carcinoma of mediastinum.    Patient with ~5 months of progressive, debilitating leg pain and unintentional weight loss. As part of workup a noncon CT was obtained 01/10/2025 demonstrating soft tissue mass in R paratracheal measuring 3 cm. Patient underwent cervical mediastinoscopy 01/30/2025 with biopsy showing lymphoid tissue with rare nonnecrotizing granulomas. Workup continued, she was seen by rheum and evaluated for sarcoidosis. Rheum workup also showed normal ESR/CRP, CCP/RF.     She underwent paratracheal mass excision on 04/30/2025 with pathology demonstrating:    FINAL DIAGNOSIS      A. LYMPH NODE, SUPERIOR VENA CAVA, EXCISION:  - One lymph node with no evidence of carcinoma (0/1).     B. LYMPH NODE, LEVEL 10, EXCISION:  - Focal small cell carcinoma, involving lymph node tissue.      C. LYMPH NODE, ELANA-AORTIC, EXCISION:  - One lymph node with no evidence of carcinoma (0/1).     D. SOFT TISSUE, DESIGNATED \"PARATRACHEAL MASS\", EXCISION:  - Small cell " carcinoma. See note.  - Tumor extends focally to disrupted inked parenchymal edge.      Note: H&E sections of this mass demonstrate a well-circumscribed nodule with a rim of lymphoid tissue and what appears to be lymph node capsule (D2).  Within the nodule there is a population of small round blue cell tumors with the morphology of the small cell carcinoma intermixed with granulomatous inflammation.  By immunohistochemical staining, the tumor cells are positive for CAM 5.2, TTF 1 (SPT24) (weak), INSM1, and chromogranin.  Ki-67 proliferation index is high, approximately 70%.  Retinoblastoma expression is globally lost within the tumor cells.  Tumor cells are negative for AE1/AE3, CD20, CD3, p40, S100, and CD1a.  CD68 highlights intranodal histiocytes.  By morphology and immunohistochemical staining, the findings are favored to represent metastatic small cell carcinoma in enlarged lymph node. Clinical correlation in regards to the primary is recommended. AFB and GMS will be ordered and reported in an addendum.     Of note: The previous lymph node biopsy slides ([id number]) were reviewed, and show no evidence of small cell carcinoma.     She presents for new patient consultation.    PAST MEDICAL HISTORY  HTN, hypothyroidism, chronic LE pain    SURGICAL HISTORY  Tubal ligation c/b hemorrhage requiring 6u blood, paratracheal mass excision     SOCIAL HISTORY  Lives in [place] with  Gurjit of 30 years. Lives with youngest daughter Sandra and 14 year old granddaughter Enid. Retired  in [place]. Heavy fiberglass exposure. Smoked 1 PPD x 50 years. 1-2 beers a day. Enjoys justino.     FAMILY HISTORY  Mother with liver cancer metastatic to lung    CURRENT MEDS REVIEWED  Current Medications[1]         ALLERGIES REVIEWED        SUBJECTIVE:   Patient is seen virtually for toxicity check. We were unable to connect via video visit.   She is now status post cycle 1 carboplatin/etoposide. She denies any fevers,  "chills or night sweats. No cough, chest pain or shortness of breath. No nausea or vomiting. No constipation or diarrhea. Dentures no longer fit. Since our last visit she underwent EGD with PEG tube placement and removal of food impaction. She is eating soft foods or liquids. Not tolerating PEG tube formula, makes her feel \"sick\" and tired. No urinary symptoms. No rash. No neuropathy hands and feet bilaterally, she is ambulatory around the house scheduled for neurology consult.    Review of Systems:  A review of systems has been completed and are negative for complaints except what is stated in the HPI and/or past medical history      OBJECTIVE:  Vital Signs/Weights/Physical Exam  - telehealth visit    Diagnostic Results   Labs:  Lab Results   Component Value Date    WBC 4.0 (L) 06/24/2025    HGB 10.6 (L) 06/24/2025    HCT 32.6 (L) 06/24/2025     (H) 06/24/2025     06/24/2025      Lab Results   Component Value Date    NEUTROABS 0.88 (L) 06/24/2025      Lab Results   Component Value Date    GLUCOSE 91 06/24/2025    CALCIUM 10.3 06/24/2025     06/24/2025    K 4.6 06/24/2025    CO2 30 06/24/2025    CL 99 06/24/2025    BUN 6 06/24/2025    CREATININE 0.54 06/24/2025    MG 1.74 05/02/2025     Lab Results   Component Value Date    ALT 14 06/24/2025    AST 19 06/24/2025    ALKPHOS 61 06/24/2025    BILITOT 0.5 06/24/2025      Lab Results   Component Value Date    TSH 0.06 (L) 03/19/2025    FREET4 1.9 (H) 03/19/2025         PET 05/20/2025  IMPRESSION:  Postsurgical changes from prior right paratracheal mass resection  without evidence of hypermetabolic activity within the surgical bed.  Moderate hypermetabolic lymph nodes in the subcarinal region with a  SUVs of up to 4.7, likely representing lymph node metastases. No  evidence of hypermetabolic distant metastases. Esophagus is patulous  and fluid-filled throughout its course, high-risk for aspiration.  Extensive subcutaneous emphysema in the right chest " wall extending to  the abdominal wall and proximal right upper extremity.    CT Chest 04/15/2025  IMPRESSION:  1.  Interval increase in size of a soft tissue mass within the right  paratracheal lymph node station measuring 3.7 x 3.3 x 5.7 cm,  previously measuring 3.2 x 3.2 x 5.0 cm on exam dated 01/10/2025. No  new sites of metastatic disease within the chest.  2. Stable size of a 0.5 cm right upper lobe pulmonary nodule. No new  or enlarging discrete suspicious pulmonary nodules.  3. Moderate emphysema.  4. Severe coronary artery calcifications.      Assessment/Plan     Ms. Jaja Bradshaw is a 68 YO with PMH of HTN, hypothyroidism, chronic LE pain seen as initial consultation for [id number] small cell carcinoma of mediastinum.    Patient with ~5 months of progressive, debilitating leg pain and unintentional weight loss. As part of workup a noncon CT was obtained 01/10/2025 demonstrating soft tissue mass in R paratracheal measuring 3 cm. Patient underwent cervical mediastinoscopy 01/30/2025 with biopsy showing lymphoid tissue with rare nonnecrotizing granulomas. Workup continued, she was seen by rheum and evaluated for sarcoidosis. Rheum workup also showed normal ESR/CRP, CCP/RF.     Regarding her cancer: she still has active disease in subcarinal region after resection. I believe she will need 4 cycles of carboplatin/etoposide. If MRI brain is without disease I will refer to rad onc for sequential vs. Concurrent RT. Plan to consolidate with durvalumab per ADRIATIC study    Regarding her weight loss, muscle wasting and myalgias: I am worried about undiagnosed paraneoplastic syndrome (cachectic amyotrophy?). She currently has neurology consultation in November, I will work to expedite this.    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 Myopathy requested per neurology   -New PCP requested. Unable to see NP PCP given her insurance  - Will begin tube feeds once supplies arrive.   -6/24/2025 follow-up with dietitian re tolerance of TF     #Dysphagia  She underwent barium swallow finding consistent with esophageal obstruction with retained food.  She is scheduled for outpatient EGD with possible dilatation, foreign body removal and PEG tube placement.  She is working with our dietitian.  06/10/2025 PEG tube now in place    #Neutropenia  - 6/24/2025  hold treatment. Repeat labs on Monday July 30th  Consider dose reduction vs. Adding Neulasta.     Kaiden Chapman, APRN-CNP  [facility]  Thoracic + H&N Medical Oncology     I spent a total of 30+ minutes on the date of the service which included preparing to see the patient, face-to-face patient care, completing clinical documentation, obtaining and / or reviewing separately obtained history, counseling and educating the patient/family/caregiver, ordering medications, tests, or procedures, communicating with other healthcare providers (not separately reported), independently interpreting results, not separately reported, and communicating results to the patient/family/caregiver, Name and date of birth verified.          [1]   Current Outpatient Medications:     acetaminophen (Tylenol) 500 mg tablet, Take 2 tablets (1,000 mg) by mouth every 8 hours if needed for mild pain (1 - 3) or moderate pain (4 - 6)., Disp: 30 tablet, Rfl: 0    aspirin 81 mg EC tablet, Take 1 tablet (81 mg) by mouth once daily., Disp: , Rfl:     cholecalciferol (Vitamin D3) 25 MCG (1000 UT) tablet, Take 2 tablets (2,000 Units) by mouth 5 times a day.,  Disp: , Rfl:     ibuprofen 600 mg tablet, Take 1 tablet (600 mg) by mouth every 6 hours., Disp: 30 tablet, Rfl: 0    ibuprofen 800 mg tablet, Take 1 tablet (800 mg) by mouth every 8 hours if needed for mild pain (1 - 3)., Disp: 90 tablet, Rfl: 2    levothyroxine (Synthroid, Levoxyl) 88 mcg tablet, Take 1 tablet (88 mcg) by mouth early in the morning.. Take on an empty stomach at the same time each day, either 30 to 60 minutes prior to breakfast, Disp: 90 tablet, Rfl: 1    nystatin (Mycostatin) 100,000 unit/mL suspension, Swish and swallow 5 mL (500,000 Units) 4 times a day., Disp: 200 mL, Rfl: 2    OLANZapine (ZyPREXA) 5 mg tablet, Take 1 tablet (5 mg) by mouth once daily at bedtime. For 4 days starting the evening of treatment., Disp: 4 tablet, Rfl: 5    ondansetron (Zofran) 8 mg tablet, Take 1 tablet (8 mg) by mouth every 8 hours if needed for nausea or vomiting., Disp: 30 tablet, Rfl: 5    prochlorperazine (Compazine) 10 mg tablet, Take 1 tablet (10 mg) by mouth every 6 hours if needed for nausea or vomiting., Disp: 30 tablet, Rfl: 5

## 2025-06-25 ENCOUNTER — APPOINTMENT (OUTPATIENT)
Dept: HEMATOLOGY/ONCOLOGY | Facility: CLINIC | Age: 70
End: 2025-06-25
Payer: MEDICARE

## 2025-06-25 ENCOUNTER — NUTRITION (OUTPATIENT)
Dept: HEMATOLOGY/ONCOLOGY | Facility: CLINIC | Age: 70
End: 2025-06-25
Payer: MEDICARE

## 2025-06-25 NOTE — PROGRESS NOTES
NUTRITION COMMUNICATION NOTE    Jaja Bradshaw     REASON FOR COMMUNICATION: Patient was scheduled this am for nutrition follow up. However, patient's treatment rescheduled for next week thus nutrition appt cancelled.  Left message informing patient of nutrition appointment change to next Wed 7/2, while in infusion.  In addition, provided patient with my direct extension to see if she would like to do a phone visit sooner, as I know patient recently had PEG placement.  Voicemail provided with direct extension for callback.

## 2025-06-26 ENCOUNTER — APPOINTMENT (OUTPATIENT)
Dept: HEMATOLOGY/ONCOLOGY | Facility: CLINIC | Age: 70
End: 2025-06-26
Payer: MEDICARE

## 2025-06-27 ENCOUNTER — APPOINTMENT (OUTPATIENT)
Dept: HEMATOLOGY/ONCOLOGY | Facility: CLINIC | Age: 70
End: 2025-06-27
Payer: MEDICARE

## 2025-06-30 ENCOUNTER — NURSE TRIAGE (OUTPATIENT)
Dept: ADMISSION | Facility: HOSPITAL | Age: 70
End: 2025-06-30
Payer: MEDICARE

## 2025-06-30 ENCOUNTER — TELEPHONE (OUTPATIENT)
Dept: CARDIOTHORACIC SURGERY | Facility: HOSPITAL | Age: 70
End: 2025-06-30
Payer: MEDICARE

## 2025-06-30 ENCOUNTER — APPOINTMENT (OUTPATIENT)
Dept: HEMATOLOGY/ONCOLOGY | Facility: CLINIC | Age: 70
End: 2025-06-30
Payer: MEDICARE

## 2025-06-30 DIAGNOSIS — R19.7 DIARRHEA, UNSPECIFIED TYPE: Primary | ICD-10-CM

## 2025-06-30 NOTE — TELEPHONE ENCOUNTER
"Patient's  called and states Jaja wants the feeding tube removed.  It is causing her discomfort and she doesn't like it\"  He states she is eating good and gaining weight.  I told him I would discuss with Dr. Gonzalez and call him back.    "

## 2025-06-30 NOTE — TELEPHONE ENCOUNTER
I received secure chat from nurse Trever that a new CBC can be done at infusion since 6/24 labs is when treatment was held. I called Jaja's  back and he now said they would like the whole week of treatment pushed back and going forward everything scheduled at  [place]. He said since this morning she has had diarrhea. She has some abdominal cramping. At least 5 all liquid, brown stools. He said she comes back from bathroom and has to go right back. Denies foul odor, blood, mucous. Denies fevers, chills, SOB, vomiting, nausea, chest pain. He said they will not make the appointment today and would like the appointments for this week rescheduled. Jaja hasn't taken any Imodium at this time. Messaged team. Added Kaiden to secure chat with Trever.

## 2025-06-30 NOTE — TELEPHONE ENCOUNTER
Sapna,  called and said Jaja has her infusion scheduled today. They didn't know labs need done again today since 6/24. ANC was 880 and treatment was held. Repeat labs on Monday in Kaiden's note. Appointment is at 11:30. Should they get labs in infusion or can they still go to [facility] an hour prior? Please advise? Messaged team and infusion. Secured chat [place].

## 2025-06-30 NOTE — PROGRESS NOTES
Obtaining stool cx.  Will start imodium  Plan for treatment July 1-3  Then plan to do day 2 and 3 at [facility]

## 2025-06-30 NOTE — TELEPHONE ENCOUNTER
Kaiden sent secure chat she talked to the  and sent in C-Diff culture and then will start Imodium. Kaiden on secure chat with  [facility] about infusion appointments this week.

## 2025-07-01 ENCOUNTER — APPOINTMENT (OUTPATIENT)
Dept: HEMATOLOGY/ONCOLOGY | Facility: CLINIC | Age: 70
End: 2025-07-01
Payer: MEDICARE

## 2025-07-01 ENCOUNTER — TELEPHONE (OUTPATIENT)
Dept: ADMISSION | Facility: HOSPITAL | Age: 70
End: 2025-07-01
Payer: MEDICARE

## 2025-07-01 DIAGNOSIS — C77.9 METASTATIC SMALL CELL CARCINOMA TO LYMPH NODE: ICD-10-CM

## 2025-07-01 NOTE — TELEPHONE ENCOUNTER
Pt's spouse calling to advise they will not make infusion today. Pt continues to have diarrhea and fatigue. States symptoms have improved but pt is exhausted and would like to reschedule infusion to next week.   Spouse denies fever. Food and fluid intake fair.   Secure chat sent to NERIS Chapman CNP and [facility] infusion team

## 2025-07-01 NOTE — TELEPHONE ENCOUNTER
Called Odus and reviewed appointment dates/times for next week. He is agreeable to all. He would like day 2 and day 3 moved to morning appointments if there are any cancellations.     They will go to [facility] for labs next Monday or Tuesday.     We reviewed the importance of Jaja staying hydrated and using Imodium PRN.     Odus also mentioned that the patient's PEG is possibly being removed today.     Team updated.

## 2025-07-02 ENCOUNTER — APPOINTMENT (OUTPATIENT)
Dept: HEMATOLOGY/ONCOLOGY | Facility: CLINIC | Age: 70
End: 2025-07-02
Payer: MEDICARE

## 2025-07-03 ENCOUNTER — APPOINTMENT (OUTPATIENT)
Dept: HEMATOLOGY/ONCOLOGY | Facility: CLINIC | Age: 70
End: 2025-07-03
Payer: MEDICARE

## 2025-07-03 ENCOUNTER — APPOINTMENT (OUTPATIENT)
Dept: NEUROLOGY | Facility: HOSPITAL | Age: 70
End: 2025-07-03
Payer: MEDICARE

## 2025-07-07 ENCOUNTER — APPOINTMENT (OUTPATIENT)
Dept: CARDIOLOGY | Facility: CLINIC | Age: 70
End: 2025-07-07
Payer: MEDICARE

## 2025-07-07 NOTE — PROGRESS NOTES
Patient ID: Jaja Bradshaw is a 69 y.o. female     Primary Care Provider: No primary care provider on file.     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    Molecular Findings:  * Gene: RB1, Variant: Stop gain - LOF, Biologically Relevant, p.E282*, Nonsense (Allele Frequency - 81.4%)  * Gene: TP53, Variant: Frameshift - LOF, Biologically Relevant, p.L201fs (Allele Frequency - 73.3%)  * Gene: CCNE1, Variant: Amplification, Copy number gain, Biologically Relevant  * Gene: CHD4, Variant: Amplification, Copy number gain, Biologically Relevant  * Gene: MYC, Variant: Amplification, Copy number gain, Biologically Relevant  * Biomarker: Microsatellite Instability, Status: stable  * Biomarker: Tumor Mutation Renwick (Value: 15.8 Muts/Mb, Percentile: 93)  * 31 variants of unknown significance    ** End of copied information **  PRIOR THERAPIES     CURRENT THERAPY  06/02/2025: First dose carboplatin/etoposide   06/25/2025: C2D1 Carboplatin AUC 2 + Etoposide 75mg/m2 - delayed due to      Other Onc Hx:  - 01/10/25: presented with ~5 months of progressive, debilitating leg pain and unintentional weight loss, noncon CT showed soft tissue mass in R paratracheal region measuring 3 cm  - 01/30/25: cervical mediastinoscopy with biopsy showing lymphoid tissue with rare nonnecrotizing granulomas. Underwent rheumatologic workup/sarcoidosis  "evaluation which was negative  - 04/30/25: paratracheal mass excision, path c/w small cell carcinoma  - 06/02/25: first dose of carboplatin/etoposide  - 06/06/25: EGD with PEG tube placement d/t esophageal dysphagia  - 06/25/25: cycle 2 carboplatin + etoposide delayed d/t      CURRENT ONCOLOGICAL PROBLEMS  Diffuse myalgias worse in lower extremities  Cachexia and muscle wasting     HISTORY OF PRESENT ILLNESS  Ms. Jaja Bradshaw is a 71 YO with PMH of HTN, hypothyroidism, chronic LE pain seen as initial consultation for [id number] small cell carcinoma of mediastinum.     Patient with ~5 months of progressive, debilitating leg pain and unintentional weight loss. As part of workup a noncon CT was obtained 01/10/2025 demonstrating soft tissue mass in R paratracheal measuring 3 cm. Patient underwent cervical mediastinoscopy 01/30/2025 with biopsy showing lymphoid tissue with rare nonnecrotizing granulomas. Workup continued, she was seen by rheum and evaluated for sarcoidosis. Rheum workup also showed normal ESR/CRP, CCP/RF.      She underwent paratracheal mass excision on 04/30/2025 with pathology demonstrating:     FINAL DIAGNOSIS      A. LYMPH NODE, SUPERIOR VENA CAVA, EXCISION:  - One lymph node with no evidence of carcinoma (0/1).     B. LYMPH NODE, LEVEL 10, EXCISION:  - Focal small cell carcinoma, involving lymph node tissue.      C. LYMPH NODE, ELANA-AORTIC, EXCISION:  - One lymph node with no evidence of carcinoma (0/1).     D. SOFT TISSUE, DESIGNATED \"PARATRACHEAL MASS\", EXCISION:  - Small cell carcinoma. See note.  - Tumor extends focally to disrupted inked parenchymal edge.      Note: H&E sections of this mass demonstrate a well-circumscribed nodule with a rim of lymphoid tissue and what appears to be lymph node capsule (D2).  Within the nodule there is a population of small round blue cell tumors with the morphology of the small cell carcinoma intermixed with granulomatous inflammation.  By " immunohistochemical staining, the tumor cells are positive for CAM 5.2, TTF 1 (SPT24) (weak), INSM1, and chromogranin.  Ki-67 proliferation index is high, approximately 70%.  Retinoblastoma expression is globally lost within the tumor cells.  Tumor cells are negative for AE1/AE3, CD20, CD3, p40, S100, and CD1a.  CD68 highlights intranodal histiocytes.  By morphology and immunohistochemical staining, the findings are favored to represent metastatic small cell carcinoma in enlarged lymph node. Clinical correlation in regards to the primary is recommended. AFB and GMS will be ordered and reported in an addendum.     Of note: The previous lymph node biopsy slides ([id number]) were reviewed, and show no evidence of small cell carcinoma.      She presents for new patient consultation.     PAST MEDICAL HISTORY  HTN, hypothyroidism, chronic LE pain     SURGICAL HISTORY  Tubal ligation c/b hemorrhage requiring 6u blood, paratracheal mass excision     SOCIAL HISTORY  Lives in [place] with  Gurjit of 30 years. Lives with youngest daughter Sandra and 14 year old granddaughter Enid. Retired  in [place]. Heavy fiberglass exposure. Smoked 1 PPD x 50 years. 1-2 beers a day. Enjoys justino.     FAMILY HISTORY  Mother with liver cancer metastatic to lung     CURRENT MEDS REVIEWED  [Current Medications]     [Current Medications]     Current Outpatient Medications:     acetaminophen (Tylenol) 500 mg tablet, Take 2 tablets (1,000 mg) by mouth every 8 hours if needed for mild pain (1 - 3) or moderate pain (4 - 6)., Disp: 30 tablet, Rfl: 0    aspirin 81 mg EC tablet, Take 1 tablet (81 mg) by mouth once daily., Disp: , Rfl:     cholecalciferol (Vitamin D3) 25 MCG (1000 UT) tablet, Take 2 tablets (2,000 Units) by mouth 5 times a day., Disp: , Rfl:     ibuprofen 600 mg tablet, Take 1 tablet (600 mg) by mouth every 6 hours. (Patient not taking: Reported on 7/8/2025), Disp: 30 tablet, Rfl: 0    ibuprofen 800 mg tablet,  Take 1 tablet (800 mg) by mouth every 8 hours if needed for mild pain (1 - 3)., Disp: 90 tablet, Rfl: 2    levothyroxine (Synthroid, Levoxyl) 88 mcg tablet, Take 1 tablet (88 mcg) by mouth early in the morning.. Take on an empty stomach at the same time each day, either 30 to 60 minutes prior to breakfast, Disp: 90 tablet, Rfl: 1    nystatin (Mycostatin) 100,000 unit/mL suspension, Swish and swallow 5 mL (500,000 Units) 4 times a day., Disp: 200 mL, Rfl: 2    OLANZapine (ZyPREXA) 5 mg tablet, Take 1 tablet (5 mg) by mouth once daily at bedtime. For 4 days starting the evening of treatment., Disp: 4 tablet, Rfl: 5    ondansetron (Zofran) 8 mg tablet, Take 1 tablet (8 mg) by mouth every 8 hours if needed for nausea or vomiting., Disp: 30 tablet, Rfl: 5    prochlorperazine (Compazine) 10 mg tablet, Take 1 tablet (10 mg) by mouth every 6 hours if needed for nausea or vomiting., Disp: 30 tablet, Rfl: 5     ALLERGIES REVIEWED         SUBJECTIVE:   Patient seen in clinic, accompanied by . Patient feels that she is doing poorly. Patient had PEG tube placed on 6/6 but says that it was removed yesterday since it was causing so much discomfort and she felt it was not helping her. She thinks she did not gain much weight when she had the PEG. She continues to have shooting pains throughout her body and says that no pain medications have helped with this. Also having diffuse weakness worse at the end of the day. She is scheduled to get an EMG and see neurology next week which she and  are hopeful will give some answers. Patient feels that she is not moving around better but  feels that she is more mobile/ambulatory. Patient and  say that swallowing is improved since patient completed treatment for thrush with nystatin swish and swallow. Taste is also improved but still has no sense of smell. She is eating more meals and is taking nutritional supplements per dietician recommendations.      Denies  fevers, nausea, vomiting, shortness of breath, cough, chest pain, leg swelling, neuropathy, rash.     Review of Systems:  A review of systems has been completed and are negative for complaints except what is stated in the HPI and/or past medical history     OBJECTIVE:  Vital Signs:      Vitals:     07/09/25 0912   BP: (!) 154/92   Pulse: 102   Resp: 16   Temp: 36.5 °C (97.7 °F)   SpO2: 100%          Vitals:     07/09/25 0912   Weight: (!) 37.2 kg (82 lb 1.6 oz)         Physical Exam:  General: frail and cachectic, no acute distress, comfortable  HEENT: normocephalic, atraumatic  Cardio: regular rate and rhythm, normal S1 and S2, no murmurs  Pulm: clear to auscultation bilaterally, no wheezes, crackles, or rhonchi, breathing comfortably on room air  Abd: normal, active bowel sounds; soft, non-tender, non-distended  Extremities: no peripheral edema, scars, or lesions  Neuro: alert and oriented to person, place, and time, CN II-XII grossly intact  Psych: mood and affect are appropriate     Diagnostic Results   Labs:        Lab Results   Component Value Date     WBC 7.0 07/08/2025     HGB 11.0 (L) 07/08/2025     HCT 33.9 (L) 07/08/2025      (H) 07/08/2025      07/08/2025            Lab Results   Component Value Date     NEUTROABS 4.09 07/08/2025            Lab Results   Component Value Date     GLUCOSE 91 06/24/2025     CALCIUM 10.3 06/24/2025      06/24/2025     K 4.6 06/24/2025     CO2 30 06/24/2025     CL 99 06/24/2025     BUN 6 06/24/2025     CREATININE 0.54 06/24/2025     MG 1.74 05/02/2025            Lab Results   Component Value Date     ALT 14 06/24/2025     AST 19 06/24/2025     ALKPHOS 61 06/24/2025     BILITOT 0.5 06/24/2025            Lab Results   Component Value Date     TSH 0.06 (L) 03/19/2025     FREET4 1.9 (H) 03/19/2025         Imaging:  MRI Brain 05/29/25:  1.  No evidence of intracranial metastatic disease or acute  intracranial abnormality.  2. Nonspecific white matter FLAIR  signal increase most likely related  to mild chronic small vessel ischemic change.     PET 05/20/2025  IMPRESSION:  Postsurgical changes from prior right paratracheal mass resection  without evidence of hypermetabolic activity within the surgical bed.  Moderate hypermetabolic lymph nodes in the subcarinal region with a  SUVs of up to 4.7, likely representing lymph node metastases. No  evidence of hypermetabolic distant metastases. Esophagus is patulous  and fluid-filled throughout its course, high-risk for aspiration.  Extensive subcutaneous emphysema in the right chest wall extending to  the abdominal wall and proximal right upper extremity.     CT Chest 04/15/2025  IMPRESSION:  1.  Interval increase in size of a soft tissue mass within the right  paratracheal lymph node station measuring 3.7 x 3.3 x 5.7 cm,  previously measuring 3.2 x 3.2 x 5.0 cm on exam dated 01/10/2025. No  new sites of metastatic disease within the chest.  2. Stable size of a 0.5 cm right upper lobe pulmonary nodule. No new  or enlarging discrete suspicious pulmonary nodules.  3. Moderate emphysema.  4. Severe coronary artery calcifications.        Assessment/Plan   Ms. Jaja Bradshaw is a 69 YO with PMH of HTN, hypothyroidism, chronic LE pain seen as follow-up for [id number] small cell carcinoma of mediastinum. Patient presented with ~5 months of progressive, debilitating leg pain and unintentional weight loss. As part of workup a noncon CT was obtained 01/10/2025 demonstrating soft tissue mass in R paratracheal measuring 3 cm. Patient underwent cervical mediastinoscopy 01/30/2025 with biopsy showing lymphoid tissue with rare nonnecrotizing granulomas. Workup continued, she was seen by rheum and evaluated for sarcoidosis. Rheum workup also showed normal ESR/CRP, CCP/RF. After surgical resection, she still had active disease in subcarinal region after resection so she was started on carboplatin/etoposide, planned for 4 cycles and plan to  consolidate with durvalumab per ADRIATIC study. Also planning for sequential radiation therapy.     Regarding her weight loss, muscle wasting and myalgias, some concern for paraneoplastic syndrome or cachectic amyotrophy. Will be getting EMG and seeing neurology (Dr. Rene Stacy) next week.     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 Myopathy requested per neurology   -Neurology appt scheduled for next week  -Continue follow-up with nutrition     #Dysphagia, improved  She underwent barium swallow finding consistent with esophageal obstruction with retained food. PEG tube placed 6/6, but removed 7/8. Patient and  report dysphagia is improved with treatment of thrush.     #Neutropenia, improved  - 6/24/2025 ANC 0.88 C2 held. ANC improved to 4.09 7/8, proceeding with cycle 2  - Plan for udenyca later this week     Patient seen and discussed with attending, Dr. Haily Vasquez MD  Internal Medicine PGY2    I saw and evaluated the patient. I personally obtained the key and critical portions of the history and physical exam or was physically present for key and critical portions performed by the resident/fellow. I reviewed the resident/fellow's documentation and discussed the patient with the resident/fellow. I agree with the resident/fellow's  medical decision making as documented in the note.     Bhavesh Johansen MD

## 2025-07-08 ENCOUNTER — LAB (OUTPATIENT)
Dept: LAB | Facility: HOSPITAL | Age: 70
End: 2025-07-08
Payer: MEDICARE

## 2025-07-08 ENCOUNTER — OFFICE VISIT (OUTPATIENT)
Dept: SURGERY | Facility: CLINIC | Age: 70
End: 2025-07-08
Payer: MEDICARE

## 2025-07-08 VITALS
DIASTOLIC BLOOD PRESSURE: 87 MMHG | BODY MASS INDEX: 14.15 KG/M2 | HEIGHT: 64 IN | SYSTOLIC BLOOD PRESSURE: 147 MMHG | HEART RATE: 109 BPM | TEMPERATURE: 97.5 F | WEIGHT: 82.9 LBS | OXYGEN SATURATION: 99 %

## 2025-07-08 DIAGNOSIS — C77.9 SECONDARY AND UNSPECIFIED MALIGNANT NEOPLASM OF LYMPH NODE, UNSPECIFIED: Primary | ICD-10-CM

## 2025-07-08 DIAGNOSIS — C77.9 METASTATIC SMALL CELL CARCINOMA TO LYMPH NODE: ICD-10-CM

## 2025-07-08 DIAGNOSIS — C77.9 METASTATIC SMALL CELL CARCINOMA TO LYMPH NODE: Primary | ICD-10-CM

## 2025-07-08 LAB
BASOPHILS # BLD AUTO: 0.06 X10*3/UL (ref 0–0.1)
BASOPHILS NFR BLD AUTO: 0.9 %
EOSINOPHIL # BLD AUTO: 0.05 X10*3/UL (ref 0–0.7)
EOSINOPHIL NFR BLD AUTO: 0.7 %
ERYTHROCYTE [DISTWIDTH] IN BLOOD BY AUTOMATED COUNT: 12.8 % (ref 11.5–14.5)
HCT VFR BLD AUTO: 33.9 % (ref 36–46)
HGB BLD-MCNC: 11 G/DL (ref 12–16)
IMM GRANULOCYTES # BLD AUTO: 0.02 X10*3/UL (ref 0–0.7)
IMM GRANULOCYTES NFR BLD AUTO: 0.3 % (ref 0–0.9)
LYMPHOCYTES # BLD AUTO: 1.9 X10*3/UL (ref 1.2–4.8)
LYMPHOCYTES NFR BLD AUTO: 27.1 %
MCH RBC QN AUTO: 33.6 PG (ref 26–34)
MCHC RBC AUTO-ENTMCNC: 32.4 G/DL (ref 32–36)
MCV RBC AUTO: 104 FL (ref 80–100)
MONOCYTES # BLD AUTO: 0.88 X10*3/UL (ref 0.1–1)
MONOCYTES NFR BLD AUTO: 12.6 %
NEUTROPHILS # BLD AUTO: 4.09 X10*3/UL (ref 1.2–7.7)
NEUTROPHILS NFR BLD AUTO: 58.4 %
NRBC BLD-RTO: 0 /100 WBCS (ref 0–0)
PLATELET # BLD AUTO: 198 X10*3/UL (ref 150–450)
RBC # BLD AUTO: 3.27 X10*6/UL (ref 4–5.2)
WBC # BLD AUTO: 7 X10*3/UL (ref 4.4–11.3)

## 2025-07-08 PROCEDURE — 99213 OFFICE O/P EST LOW 20 MIN: CPT | Performed by: STUDENT IN AN ORGANIZED HEALTH CARE EDUCATION/TRAINING PROGRAM

## 2025-07-08 PROCEDURE — 85025 COMPLETE CBC W/AUTO DIFF WBC: CPT

## 2025-07-08 PROCEDURE — 3079F DIAST BP 80-89 MM HG: CPT | Performed by: STUDENT IN AN ORGANIZED HEALTH CARE EDUCATION/TRAINING PROGRAM

## 2025-07-08 PROCEDURE — 3077F SYST BP >= 140 MM HG: CPT | Performed by: STUDENT IN AN ORGANIZED HEALTH CARE EDUCATION/TRAINING PROGRAM

## 2025-07-08 PROCEDURE — 3008F BODY MASS INDEX DOCD: CPT | Performed by: STUDENT IN AN ORGANIZED HEALTH CARE EDUCATION/TRAINING PROGRAM

## 2025-07-08 PROCEDURE — 36415 COLL VENOUS BLD VENIPUNCTURE: CPT

## 2025-07-08 PROCEDURE — 1159F MED LIST DOCD IN RCRD: CPT | Performed by: STUDENT IN AN ORGANIZED HEALTH CARE EDUCATION/TRAINING PROGRAM

## 2025-07-09 ENCOUNTER — NUTRITION (OUTPATIENT)
Dept: HEMATOLOGY/ONCOLOGY | Facility: CLINIC | Age: 70
End: 2025-07-09
Payer: MEDICARE

## 2025-07-09 ENCOUNTER — OFFICE VISIT (OUTPATIENT)
Dept: HEMATOLOGY/ONCOLOGY | Facility: CLINIC | Age: 70
End: 2025-07-09
Payer: MEDICARE

## 2025-07-09 ENCOUNTER — INFUSION (OUTPATIENT)
Dept: HEMATOLOGY/ONCOLOGY | Facility: CLINIC | Age: 70
End: 2025-07-09
Payer: MEDICARE

## 2025-07-09 VITALS
SYSTOLIC BLOOD PRESSURE: 154 MMHG | HEART RATE: 102 BPM | OXYGEN SATURATION: 100 % | BODY MASS INDEX: 14.09 KG/M2 | TEMPERATURE: 97.7 F | WEIGHT: 82.1 LBS | RESPIRATION RATE: 16 BRPM | DIASTOLIC BLOOD PRESSURE: 92 MMHG

## 2025-07-09 DIAGNOSIS — C77.9 METASTATIC SMALL CELL CARCINOMA TO LYMPH NODE: ICD-10-CM

## 2025-07-09 DIAGNOSIS — C77.9 METASTATIC SMALL CELL CARCINOMA TO LYMPH NODE: Primary | ICD-10-CM

## 2025-07-09 LAB
ALBUMIN SERPL BCP-MCNC: 4.6 G/DL (ref 3.4–5)
ALP SERPL-CCNC: 70 U/L (ref 33–136)
ALT SERPL W P-5'-P-CCNC: 11 U/L (ref 7–45)
ANION GAP SERPL CALC-SCNC: 13 MMOL/L (ref 10–20)
AST SERPL W P-5'-P-CCNC: 17 U/L (ref 9–39)
BILIRUB SERPL-MCNC: 0.4 MG/DL (ref 0–1.2)
BUN SERPL-MCNC: 10 MG/DL (ref 6–23)
CALCIUM SERPL-MCNC: 10.4 MG/DL (ref 8.6–10.6)
CHLORIDE SERPL-SCNC: 101 MMOL/L (ref 98–107)
CO2 SERPL-SCNC: 28 MMOL/L (ref 21–32)
CREAT SERPL-MCNC: 0.5 MG/DL (ref 0.5–1.05)
EGFRCR SERPLBLD CKD-EPI 2021: >90 ML/MIN/1.73M*2
GLUCOSE SERPL-MCNC: 110 MG/DL (ref 74–99)
LDH SERPL L TO P-CCNC: 148 U/L (ref 84–246)
POTASSIUM SERPL-SCNC: 4.1 MMOL/L (ref 3.5–5.3)
PROT SERPL-MCNC: 7.3 G/DL (ref 6.4–8.2)
SODIUM SERPL-SCNC: 138 MMOL/L (ref 136–145)

## 2025-07-09 PROCEDURE — 3080F DIAST BP >= 90 MM HG: CPT | Performed by: STUDENT IN AN ORGANIZED HEALTH CARE EDUCATION/TRAINING PROGRAM

## 2025-07-09 PROCEDURE — 2500000004 HC RX 250 GENERAL PHARMACY W/ HCPCS (ALT 636 FOR OP/ED): Performed by: NURSE PRACTITIONER

## 2025-07-09 PROCEDURE — 1125F AMNT PAIN NOTED PAIN PRSNT: CPT | Performed by: STUDENT IN AN ORGANIZED HEALTH CARE EDUCATION/TRAINING PROGRAM

## 2025-07-09 PROCEDURE — 96367 TX/PROPH/DG ADDL SEQ IV INF: CPT

## 2025-07-09 PROCEDURE — 96417 CHEMO IV INFUS EACH ADDL SEQ: CPT

## 2025-07-09 PROCEDURE — 2500000004 HC RX 250 GENERAL PHARMACY W/ HCPCS (ALT 636 FOR OP/ED): Performed by: STUDENT IN AN ORGANIZED HEALTH CARE EDUCATION/TRAINING PROGRAM

## 2025-07-09 PROCEDURE — 96375 TX/PRO/DX INJ NEW DRUG ADDON: CPT | Mod: INF

## 2025-07-09 PROCEDURE — 80053 COMPREHEN METABOLIC PANEL: CPT

## 2025-07-09 PROCEDURE — 3077F SYST BP >= 140 MM HG: CPT | Performed by: STUDENT IN AN ORGANIZED HEALTH CARE EDUCATION/TRAINING PROGRAM

## 2025-07-09 PROCEDURE — G2211 COMPLEX E/M VISIT ADD ON: HCPCS | Performed by: STUDENT IN AN ORGANIZED HEALTH CARE EDUCATION/TRAINING PROGRAM

## 2025-07-09 PROCEDURE — 96415 CHEMO IV INFUSION ADDL HR: CPT

## 2025-07-09 PROCEDURE — 1159F MED LIST DOCD IN RCRD: CPT | Performed by: STUDENT IN AN ORGANIZED HEALTH CARE EDUCATION/TRAINING PROGRAM

## 2025-07-09 PROCEDURE — 96365 THER/PROPH/DIAG IV INF INIT: CPT | Mod: INF

## 2025-07-09 PROCEDURE — 96413 CHEMO IV INFUSION 1 HR: CPT

## 2025-07-09 PROCEDURE — 99215 OFFICE O/P EST HI 40 MIN: CPT | Mod: 25 | Performed by: STUDENT IN AN ORGANIZED HEALTH CARE EDUCATION/TRAINING PROGRAM

## 2025-07-09 PROCEDURE — 83615 LACTATE (LD) (LDH) ENZYME: CPT

## 2025-07-09 PROCEDURE — 99215 OFFICE O/P EST HI 40 MIN: CPT | Performed by: STUDENT IN AN ORGANIZED HEALTH CARE EDUCATION/TRAINING PROGRAM

## 2025-07-09 PROCEDURE — 96409 CHEMO IV PUSH SNGL DRUG: CPT

## 2025-07-09 RX ORDER — PROCHLORPERAZINE MALEATE 10 MG
10 TABLET ORAL EVERY 6 HOURS PRN
OUTPATIENT
Start: 2025-08-01

## 2025-07-09 RX ORDER — FAMOTIDINE 10 MG/ML
20 INJECTION, SOLUTION INTRAVENOUS ONCE AS NEEDED
OUTPATIENT
Start: 2025-08-21

## 2025-07-09 RX ORDER — PROCHLORPERAZINE MALEATE 10 MG
10 TABLET ORAL EVERY 6 HOURS PRN
OUTPATIENT
Start: 2025-08-20

## 2025-07-09 RX ORDER — EPINEPHRINE 0.3 MG/.3ML
0.3 INJECTION SUBCUTANEOUS EVERY 5 MIN PRN
OUTPATIENT
Start: 2025-08-01

## 2025-07-09 RX ORDER — FAMOTIDINE 10 MG/ML
20 INJECTION, SOLUTION INTRAVENOUS ONCE AS NEEDED
Status: DISCONTINUED | OUTPATIENT
Start: 2025-07-09 | End: 2025-07-09 | Stop reason: HOSPADM

## 2025-07-09 RX ORDER — EPINEPHRINE 0.3 MG/.3ML
0.3 INJECTION SUBCUTANEOUS EVERY 5 MIN PRN
Status: DISCONTINUED | OUTPATIENT
Start: 2025-07-09 | End: 2025-07-09 | Stop reason: HOSPADM

## 2025-07-09 RX ORDER — PROCHLORPERAZINE EDISYLATE 5 MG/ML
10 INJECTION INTRAMUSCULAR; INTRAVENOUS EVERY 6 HOURS PRN
Status: DISCONTINUED | OUTPATIENT
Start: 2025-07-09 | End: 2025-07-09 | Stop reason: HOSPADM

## 2025-07-09 RX ORDER — PROCHLORPERAZINE MALEATE 10 MG
10 TABLET ORAL EVERY 6 HOURS PRN
OUTPATIENT
Start: 2025-07-31

## 2025-07-09 RX ORDER — ALBUTEROL SULFATE 0.83 MG/ML
3 SOLUTION RESPIRATORY (INHALATION) AS NEEDED
OUTPATIENT
Start: 2025-08-22

## 2025-07-09 RX ORDER — PROCHLORPERAZINE EDISYLATE 5 MG/ML
10 INJECTION INTRAMUSCULAR; INTRAVENOUS EVERY 6 HOURS PRN
OUTPATIENT
Start: 2025-08-01

## 2025-07-09 RX ORDER — DIPHENHYDRAMINE HYDROCHLORIDE 50 MG/ML
50 INJECTION, SOLUTION INTRAMUSCULAR; INTRAVENOUS AS NEEDED
OUTPATIENT
Start: 2025-07-30

## 2025-07-09 RX ORDER — ALBUTEROL SULFATE 0.83 MG/ML
3 SOLUTION RESPIRATORY (INHALATION) AS NEEDED
OUTPATIENT
Start: 2025-08-01

## 2025-07-09 RX ORDER — ALBUTEROL SULFATE 0.83 MG/ML
3 SOLUTION RESPIRATORY (INHALATION) AS NEEDED
OUTPATIENT
Start: 2025-08-20

## 2025-07-09 RX ORDER — PROCHLORPERAZINE MALEATE 10 MG
10 TABLET ORAL EVERY 6 HOURS PRN
Status: DISCONTINUED | OUTPATIENT
Start: 2025-07-09 | End: 2025-07-09 | Stop reason: HOSPADM

## 2025-07-09 RX ORDER — PROCHLORPERAZINE EDISYLATE 5 MG/ML
10 INJECTION INTRAMUSCULAR; INTRAVENOUS EVERY 6 HOURS PRN
OUTPATIENT
Start: 2025-08-20

## 2025-07-09 RX ORDER — PROCHLORPERAZINE EDISYLATE 5 MG/ML
10 INJECTION INTRAMUSCULAR; INTRAVENOUS EVERY 6 HOURS PRN
OUTPATIENT
Start: 2025-08-22

## 2025-07-09 RX ORDER — ONDANSETRON HYDROCHLORIDE 2 MG/ML
8 INJECTION, SOLUTION INTRAVENOUS ONCE
OUTPATIENT
Start: 2025-08-22

## 2025-07-09 RX ORDER — ALBUTEROL SULFATE 0.83 MG/ML
3 SOLUTION RESPIRATORY (INHALATION) AS NEEDED
OUTPATIENT
Start: 2025-07-31

## 2025-07-09 RX ORDER — PROCHLORPERAZINE MALEATE 10 MG
10 TABLET ORAL EVERY 6 HOURS PRN
OUTPATIENT
Start: 2025-07-30

## 2025-07-09 RX ORDER — FAMOTIDINE 10 MG/ML
20 INJECTION, SOLUTION INTRAVENOUS ONCE AS NEEDED
OUTPATIENT
Start: 2025-08-20

## 2025-07-09 RX ORDER — EPINEPHRINE 0.3 MG/.3ML
0.3 INJECTION SUBCUTANEOUS EVERY 5 MIN PRN
OUTPATIENT
Start: 2025-07-31

## 2025-07-09 RX ORDER — PROCHLORPERAZINE MALEATE 10 MG
10 TABLET ORAL EVERY 6 HOURS PRN
OUTPATIENT
Start: 2025-08-21

## 2025-07-09 RX ORDER — ALBUTEROL SULFATE 0.83 MG/ML
3 SOLUTION RESPIRATORY (INHALATION) AS NEEDED
OUTPATIENT
Start: 2025-08-21

## 2025-07-09 RX ORDER — DIPHENHYDRAMINE HYDROCHLORIDE 50 MG/ML
50 INJECTION, SOLUTION INTRAMUSCULAR; INTRAVENOUS AS NEEDED
OUTPATIENT
Start: 2025-07-31

## 2025-07-09 RX ORDER — FAMOTIDINE 10 MG/ML
20 INJECTION, SOLUTION INTRAVENOUS ONCE AS NEEDED
OUTPATIENT
Start: 2025-07-30

## 2025-07-09 RX ORDER — DIPHENHYDRAMINE HYDROCHLORIDE 50 MG/ML
50 INJECTION, SOLUTION INTRAMUSCULAR; INTRAVENOUS AS NEEDED
OUTPATIENT
Start: 2025-08-01

## 2025-07-09 RX ORDER — PROCHLORPERAZINE MALEATE 10 MG
10 TABLET ORAL EVERY 6 HOURS PRN
OUTPATIENT
Start: 2025-08-22

## 2025-07-09 RX ORDER — DIPHENHYDRAMINE HYDROCHLORIDE 50 MG/ML
50 INJECTION, SOLUTION INTRAMUSCULAR; INTRAVENOUS AS NEEDED
Status: DISCONTINUED | OUTPATIENT
Start: 2025-07-09 | End: 2025-07-09 | Stop reason: HOSPADM

## 2025-07-09 RX ORDER — HEPARIN 100 UNIT/ML
500 SYRINGE INTRAVENOUS AS NEEDED
OUTPATIENT
Start: 2025-07-09

## 2025-07-09 RX ORDER — FAMOTIDINE 10 MG/ML
20 INJECTION, SOLUTION INTRAVENOUS ONCE AS NEEDED
OUTPATIENT
Start: 2025-08-01

## 2025-07-09 RX ORDER — ONDANSETRON HYDROCHLORIDE 2 MG/ML
8 INJECTION, SOLUTION INTRAVENOUS ONCE
OUTPATIENT
Start: 2025-08-01

## 2025-07-09 RX ORDER — DIPHENHYDRAMINE HYDROCHLORIDE 50 MG/ML
50 INJECTION, SOLUTION INTRAMUSCULAR; INTRAVENOUS AS NEEDED
OUTPATIENT
Start: 2025-08-22

## 2025-07-09 RX ORDER — EPINEPHRINE 0.3 MG/.3ML
0.3 INJECTION SUBCUTANEOUS EVERY 5 MIN PRN
OUTPATIENT
Start: 2025-08-21

## 2025-07-09 RX ORDER — PROCHLORPERAZINE EDISYLATE 5 MG/ML
10 INJECTION INTRAMUSCULAR; INTRAVENOUS EVERY 6 HOURS PRN
OUTPATIENT
Start: 2025-08-21

## 2025-07-09 RX ORDER — PROCHLORPERAZINE EDISYLATE 5 MG/ML
10 INJECTION INTRAMUSCULAR; INTRAVENOUS EVERY 6 HOURS PRN
OUTPATIENT
Start: 2025-07-30

## 2025-07-09 RX ORDER — EPINEPHRINE 0.3 MG/.3ML
0.3 INJECTION SUBCUTANEOUS EVERY 5 MIN PRN
OUTPATIENT
Start: 2025-08-22

## 2025-07-09 RX ORDER — EPINEPHRINE 0.3 MG/.3ML
0.3 INJECTION SUBCUTANEOUS EVERY 5 MIN PRN
OUTPATIENT
Start: 2025-07-30

## 2025-07-09 RX ORDER — PALONOSETRON 0.05 MG/ML
0.25 INJECTION, SOLUTION INTRAVENOUS ONCE
OUTPATIENT
Start: 2025-07-30

## 2025-07-09 RX ORDER — FAMOTIDINE 10 MG/ML
20 INJECTION, SOLUTION INTRAVENOUS ONCE AS NEEDED
OUTPATIENT
Start: 2025-07-31

## 2025-07-09 RX ORDER — EPINEPHRINE 0.3 MG/.3ML
0.3 INJECTION SUBCUTANEOUS EVERY 5 MIN PRN
OUTPATIENT
Start: 2025-08-20

## 2025-07-09 RX ORDER — FAMOTIDINE 10 MG/ML
20 INJECTION, SOLUTION INTRAVENOUS ONCE AS NEEDED
OUTPATIENT
Start: 2025-08-22

## 2025-07-09 RX ORDER — PROCHLORPERAZINE EDISYLATE 5 MG/ML
10 INJECTION INTRAMUSCULAR; INTRAVENOUS EVERY 6 HOURS PRN
OUTPATIENT
Start: 2025-07-31

## 2025-07-09 RX ORDER — ALBUTEROL SULFATE 0.83 MG/ML
3 SOLUTION RESPIRATORY (INHALATION) AS NEEDED
Status: DISCONTINUED | OUTPATIENT
Start: 2025-07-09 | End: 2025-07-09 | Stop reason: HOSPADM

## 2025-07-09 RX ORDER — PALONOSETRON 0.05 MG/ML
0.25 INJECTION, SOLUTION INTRAVENOUS ONCE
OUTPATIENT
Start: 2025-08-20

## 2025-07-09 RX ORDER — PALONOSETRON 0.05 MG/ML
0.25 INJECTION, SOLUTION INTRAVENOUS ONCE
Status: COMPLETED | OUTPATIENT
Start: 2025-07-09 | End: 2025-07-09

## 2025-07-09 RX ORDER — HEPARIN SODIUM,PORCINE/PF 10 UNIT/ML
50 SYRINGE (ML) INTRAVENOUS AS NEEDED
OUTPATIENT
Start: 2025-07-09

## 2025-07-09 RX ORDER — DIPHENHYDRAMINE HYDROCHLORIDE 50 MG/ML
50 INJECTION, SOLUTION INTRAMUSCULAR; INTRAVENOUS AS NEEDED
OUTPATIENT
Start: 2025-08-20

## 2025-07-09 RX ORDER — DIPHENHYDRAMINE HYDROCHLORIDE 50 MG/ML
50 INJECTION, SOLUTION INTRAMUSCULAR; INTRAVENOUS AS NEEDED
OUTPATIENT
Start: 2025-08-21

## 2025-07-09 RX ORDER — ALBUTEROL SULFATE 0.83 MG/ML
3 SOLUTION RESPIRATORY (INHALATION) AS NEEDED
OUTPATIENT
Start: 2025-07-30

## 2025-07-09 RX ADMIN — FOSAPREPITANT 150 MG: 150 INJECTION, POWDER, LYOPHILIZED, FOR SOLUTION INTRAVENOUS at 11:11

## 2025-07-09 RX ADMIN — CARBOPLATIN 280 MG: 10 INJECTION, SOLUTION INTRAVENOUS at 11:45

## 2025-07-09 RX ADMIN — SODIUM CHLORIDE 98 MG: 0.9 INJECTION, SOLUTION INTRAVENOUS at 12:19

## 2025-07-09 RX ADMIN — PALONOSETRON 0.25 MG: 0.05 INJECTION, SOLUTION INTRAVENOUS at 10:49

## 2025-07-09 RX ADMIN — DEXAMETHASONE SODIUM PHOSPHATE 12 MG: 10 INJECTION, SOLUTION INTRAMUSCULAR; INTRAVENOUS at 10:49

## 2025-07-09 ASSESSMENT — PAIN SCALES - GENERAL
PAINLEVEL_OUTOF10: 10-WORST PAIN EVER
PAINLEVEL_OUTOF10: 10-WORST PAIN EVER

## 2025-07-09 NOTE — PROGRESS NOTES
Patient ID: Jaja Bradshaw is a 69 y.o. female    Primary Care Provider: No primary care provider on file.    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    Molecular Findings:  * Gene: RB1, Variant: Stop gain - LOF, Biologically Relevant, p.E282*, Nonsense (Allele Frequency - 81.4%)  * Gene: TP53, Variant: Frameshift - LOF, Biologically Relevant, p.L201fs (Allele Frequency - 73.3%)  * Gene: CCNE1, Variant: Amplification, Copy number gain, Biologically Relevant  * Gene: CHD4, Variant: Amplification, Copy number gain, Biologically Relevant  * Gene: MYC, Variant: Amplification, Copy number gain, Biologically Relevant  * Biomarker: Microsatellite Instability, Status: stable  * Biomarker: Tumor Mutation Ashkum (Value: 15.8 Muts/Mb, Percentile: 93)  * 31 variants of unknown significance    ** End of copied information **     PRIOR THERAPIES     CURRENT THERAPY  06/02/2025: First dose carboplatin/etoposide   06/25/2025: C2D1 Carboplatin AUC 2 + Etoposide 75mg/m2 - delayed due to     Other Onc Hx:  - 01/10/25: presented with ~5 months of progressive, debilitating leg pain and unintentional weight loss, noncon CT showed soft tissue mass in R paratracheal region measuring 3 cm  - 01/30/25: cervical mediastinoscopy with biopsy showing lymphoid tissue with rare nonnecrotizing granulomas. Underwent rheumatologic workup/sarcoidosis  "evaluation which was negative  - 04/30/25: paratracheal mass excision, path c/w small cell carcinoma  - 06/02/25: first dose of carboplatin/etoposide  - 06/06/25: EGD with PEG tube placement d/t esophageal dysphagia  - 06/25/25: cycle 2 carboplatin + etoposide delayed d/t     CURRENT ONCOLOGICAL PROBLEMS  Diffuse myalgias worse in lower extremities  Cachexia and muscle wasting     HISTORY OF PRESENT ILLNESS  Ms. Jaja Bradshaw is a 69 YO with PMH of HTN, hypothyroidism, chronic LE pain seen as initial consultation for [id number] small cell carcinoma of mediastinum.    Patient with ~5 months of progressive, debilitating leg pain and unintentional weight loss. As part of workup a noncon CT was obtained 01/10/2025 demonstrating soft tissue mass in R paratracheal measuring 3 cm. Patient underwent cervical mediastinoscopy 01/30/2025 with biopsy showing lymphoid tissue with rare nonnecrotizing granulomas. Workup continued, she was seen by rheum and evaluated for sarcoidosis. Rheum workup also showed normal ESR/CRP, CCP/RF.     She underwent paratracheal mass excision on 04/30/2025 with pathology demonstrating:    FINAL DIAGNOSIS      A. LYMPH NODE, SUPERIOR VENA CAVA, EXCISION:  - One lymph node with no evidence of carcinoma (0/1).     B. LYMPH NODE, LEVEL 10, EXCISION:  - Focal small cell carcinoma, involving lymph node tissue.      C. LYMPH NODE, ELANA-AORTIC, EXCISION:  - One lymph node with no evidence of carcinoma (0/1).     D. SOFT TISSUE, DESIGNATED \"PARATRACHEAL MASS\", EXCISION:  - Small cell carcinoma. See note.  - Tumor extends focally to disrupted inked parenchymal edge.      Note: H&E sections of this mass demonstrate a well-circumscribed nodule with a rim of lymphoid tissue and what appears to be lymph node capsule (D2).  Within the nodule there is a population of small round blue cell tumors with the morphology of the small cell carcinoma intermixed with granulomatous inflammation.  By " immunohistochemical staining, the tumor cells are positive for CAM 5.2, TTF 1 (SPT24) (weak), INSM1, and chromogranin.  Ki-67 proliferation index is high, approximately 70%.  Retinoblastoma expression is globally lost within the tumor cells.  Tumor cells are negative for AE1/AE3, CD20, CD3, p40, S100, and CD1a.  CD68 highlights intranodal histiocytes.  By morphology and immunohistochemical staining, the findings are favored to represent metastatic small cell carcinoma in enlarged lymph node. Clinical correlation in regards to the primary is recommended. AFB and GMS will be ordered and reported in an addendum.     Of note: The previous lymph node biopsy slides ([id number]) were reviewed, and show no evidence of small cell carcinoma.     She presents for new patient consultation.    PAST MEDICAL HISTORY  HTN, hypothyroidism, chronic LE pain    SURGICAL HISTORY  Tubal ligation c/b hemorrhage requiring 6u blood, paratracheal mass excision     SOCIAL HISTORY  Lives in [place] with  Gurjit of 30 years. Lives with youngest daughter Sandra and 14 year old granddaughter Enid. Retired  in [place]. Heavy fiberglass exposure. Smoked 1 PPD x 50 years. 1-2 beers a day. Enjoys justino.     FAMILY HISTORY  Mother with liver cancer metastatic to lung    CURRENT MEDS REVIEWED  [Current Medications]    [Current Medications]    Current Outpatient Medications:     acetaminophen (Tylenol) 500 mg tablet, Take 2 tablets (1,000 mg) by mouth every 8 hours if needed for mild pain (1 - 3) or moderate pain (4 - 6)., Disp: 30 tablet, Rfl: 0    aspirin 81 mg EC tablet, Take 1 tablet (81 mg) by mouth once daily., Disp: , Rfl:     cholecalciferol (Vitamin D3) 25 MCG (1000 UT) tablet, Take 2 tablets (2,000 Units) by mouth 5 times a day., Disp: , Rfl:     ibuprofen 600 mg tablet, Take 1 tablet (600 mg) by mouth every 6 hours. (Patient not taking: Reported on 7/8/2025), Disp: 30 tablet, Rfl: 0    ibuprofen 800 mg tablet, Take 1  tablet (800 mg) by mouth every 8 hours if needed for mild pain (1 - 3)., Disp: 90 tablet, Rfl: 2    levothyroxine (Synthroid, Levoxyl) 88 mcg tablet, Take 1 tablet (88 mcg) by mouth early in the morning.. Take on an empty stomach at the same time each day, either 30 to 60 minutes prior to breakfast, Disp: 90 tablet, Rfl: 1    nystatin (Mycostatin) 100,000 unit/mL suspension, Swish and swallow 5 mL (500,000 Units) 4 times a day., Disp: 200 mL, Rfl: 2    OLANZapine (ZyPREXA) 5 mg tablet, Take 1 tablet (5 mg) by mouth once daily at bedtime. For 4 days starting the evening of treatment., Disp: 4 tablet, Rfl: 5    ondansetron (Zofran) 8 mg tablet, Take 1 tablet (8 mg) by mouth every 8 hours if needed for nausea or vomiting., Disp: 30 tablet, Rfl: 5    prochlorperazine (Compazine) 10 mg tablet, Take 1 tablet (10 mg) by mouth every 6 hours if needed for nausea or vomiting., Disp: 30 tablet, Rfl: 5     ALLERGIES REVIEWED        SUBJECTIVE:   Patient seen in clinic, accompanied by . Patient feels that she is doing poorly. Patient had PEG tube placed on 6/6 but says that it was removed yesterday since it was causing so much discomfort and she felt it was not helping her. She thinks she did not gain much weight when she had the PEG. She continues to have shooting pains throughout her body and says that no pain medications have helped with this. Also having diffuse weakness worse at the end of the day. She is scheduled to get an EMG and see neurology next week which she and  are hopeful will give some answers. Patient feels that she is not moving around better but  feels that she is more mobile/ambulatory. Patient and  say that swallowing is improved since patient completed treatment for thrush with nystatin swish and swallow. Taste is also improved but still has no sense of smell. She is eating more meals and is taking nutritional supplements per dietician recommendations.     Denies fevers, nausea,  vomiting, shortness of breath, cough, chest pain, leg swelling, neuropathy, rash.    Review of Systems:  A review of systems has been completed and are negative for complaints except what is stated in the HPI and/or past medical history    OBJECTIVE:  Vital Signs:  Vitals:    07/09/25 0912   BP: (!) 154/92   Pulse: 102   Resp: 16   Temp: 36.5 °C (97.7 °F)   SpO2: 100%     Vitals:    07/09/25 0912   Weight: (!) 37.2 kg (82 lb 1.6 oz)       Physical Exam:  General: frail and cachectic, no acute distress, comfortable  HEENT: normocephalic, atraumatic  Cardio: regular rate and rhythm, normal S1 and S2, no murmurs  Pulm: clear to auscultation bilaterally, no wheezes, crackles, or rhonchi, breathing comfortably on room air  Abd: normal, active bowel sounds; soft, non-tender, non-distended  Extremities: no peripheral edema, scars, or lesions  Neuro: alert and oriented to person, place, and time, CN II-XII grossly intact  Psych: mood and affect are appropriate    Diagnostic Results   Labs:  Lab Results   Component Value Date    WBC 7.0 07/08/2025    HGB 11.0 (L) 07/08/2025    HCT 33.9 (L) 07/08/2025     (H) 07/08/2025     07/08/2025      Lab Results   Component Value Date    NEUTROABS 4.09 07/08/2025      Lab Results   Component Value Date    GLUCOSE 91 06/24/2025    CALCIUM 10.3 06/24/2025     06/24/2025    K 4.6 06/24/2025    CO2 30 06/24/2025    CL 99 06/24/2025    BUN 6 06/24/2025    CREATININE 0.54 06/24/2025    MG 1.74 05/02/2025     Lab Results   Component Value Date    ALT 14 06/24/2025    AST 19 06/24/2025    ALKPHOS 61 06/24/2025    BILITOT 0.5 06/24/2025      Lab Results   Component Value Date    TSH 0.06 (L) 03/19/2025    FREET4 1.9 (H) 03/19/2025       Imaging:  MRI Brain 05/29/25:  1.  No evidence of intracranial metastatic disease or acute  intracranial abnormality.  2. Nonspecific white matter FLAIR signal increase most likely related  to mild chronic small vessel ischemic change.    PET  05/20/2025  IMPRESSION:  Postsurgical changes from prior right paratracheal mass resection  without evidence of hypermetabolic activity within the surgical bed.  Moderate hypermetabolic lymph nodes in the subcarinal region with a  SUVs of up to 4.7, likely representing lymph node metastases. No  evidence of hypermetabolic distant metastases. Esophagus is patulous  and fluid-filled throughout its course, high-risk for aspiration.  Extensive subcutaneous emphysema in the right chest wall extending to  the abdominal wall and proximal right upper extremity.    CT Chest 04/15/2025  IMPRESSION:  1.  Interval increase in size of a soft tissue mass within the right  paratracheal lymph node station measuring 3.7 x 3.3 x 5.7 cm,  previously measuring 3.2 x 3.2 x 5.0 cm on exam dated 01/10/2025. No  new sites of metastatic disease within the chest.  2. Stable size of a 0.5 cm right upper lobe pulmonary nodule. No new  or enlarging discrete suspicious pulmonary nodules.  3. Moderate emphysema.  4. Severe coronary artery calcifications.      Assessment/Plan   Ms. Jaja Bradshaw is a 71 YO with PMH of HTN, hypothyroidism, chronic LE pain seen as follow-up for [id number] small cell carcinoma of mediastinum. Patient presented with ~5 months of progressive, debilitating leg pain and unintentional weight loss. As part of workup a noncon CT was obtained 01/10/2025 demonstrating soft tissue mass in R paratracheal measuring 3 cm. Patient underwent cervical mediastinoscopy 01/30/2025 with biopsy showing lymphoid tissue with rare nonnecrotizing granulomas. Workup continued, she was seen by rheum and evaluated for sarcoidosis. Rheum workup also showed normal ESR/CRP, CCP/RF. After surgical resection, she still had active disease in subcarinal region after resection so she was started on carboplatin/etoposide, planned for 4 cycles and plan to consolidate with durvalumab per ADRIATIC study. Also planning for sequential radiation  therapy.    Regarding her weight loss, muscle wasting and myalgias, some concern for paraneoplastic syndrome or cachectic amyotrophy. Will be getting EMG and seeing neurology (Dr. Rene Stacy) next week.    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 Myopathy requested per neurology   -Neurology appt scheduled for next week  -Continue follow-up with nutrition    #Dysphagia, improved  She underwent barium swallow finding consistent with esophageal obstruction with retained food. PEG tube placed 6/6, but removed 7/8. Patient and  report dysphagia is improved with treatment of thrush.    #Neutropenia, improved  6/24/2025 ANC 0.88 C2 held. ANC improved to 4.09 7/8, proceeding with cycle 2  - Plan for udenyca later this week    Patient seen and discussed with attending, Dr. Haily Vasquez MD  Internal Medicine PGY2

## 2025-07-09 NOTE — PROGRESS NOTES
NUTRITION Assessment NOTE    Nutrition Assessment     Reason for Visit:  Jaja Bradshaw is a 70 y.o. 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 She is here for treatment.     Problem List[1]    Nutrition Significant labs:  Lab Results   Component Value Date/Time    GLUCOSE 91 06/24/2025 1116     06/24/2025 1116    K 4.6 06/24/2025 1116    CL 99 06/24/2025 1116    CO2 30 06/24/2025 1116    ANIONGAP 13 06/24/2025 1116    BUN 6 06/24/2025 1116    CREATININE 0.54 06/24/2025 1116    EGFR >90 06/24/2025 1116    CALCIUM 10.3 06/24/2025 1116    ALBUMIN 4.5 06/24/2025 1116    ALKPHOS 61 06/24/2025 1116    PROT 6.8 06/24/2025 1116    AST 19 06/24/2025 1116    BILITOT 0.5 06/24/2025 1116    ALT 14 06/24/2025 1116    MG 1.74 05/02/2025 0753     Lab Results   Component Value Date/Time    VITD25 29 (A) 02/15/2020 0755     CMP Trend:    Recent Labs     06/24/25  1116 06/02/25  1354 05/02/25  0753 05/01/25  0717 12/02/24  0958   GLUCOSE 91 98 90 81 104*    141 140 138 138   K 4.6 2.9* 3.7 3.7 3.5   CL 99 99 102 99 101   CO2 30 28 29 27 28   ANIONGAP 13 17 13 16 13   BUN 6 10 8 8 7   CREATININE 0.54 0.51 0.40* 0.57 0.67   EGFR >90 >90 >90 >90 >90   CALCIUM 10.3 10.4 9.7 9.8 9.3   ALBUMIN 4.5 4.6  --  3.8 4.0   ALKPHOS 61 51  --   --  87   PROT 6.8 7.0  --   --  6.5   AST 19 23  --   --  39   BILITOT 0.5 1.1  --   --  0.9   ALT 14 16  --   --  21   , CBC Trend:   Recent Labs     07/08/25  1125 06/24/25  1116 06/02/25  1354 05/02/25  0753   WBC 7.0 4.0*  "8.5 9.8   NRBC 0.0 0.0  --  0.0   RBC 3.27* 3.11* 3.90* 3.17*   HGB 11.0* 10.6* 13.2 10.5*   HCT 33.9* 32.6* 38.1 32.3*   * 105* 98 102*   MCH 33.6 34.1* 33.8 33.1   MCHC 32.4 32.5 34.6 32.5   RDW 12.8 13.1 12.2 13.0    298 205 190   , RFP + Serum Mag Trend:   Recent Labs     06/24/25  1116 06/02/25  1354 05/02/25  0753 05/01/25  0717 12/02/24  0958 11/01/24  1126   GLUCOSE 91 98 90 81   < > 118*    141 140 138   < > 136   K 4.6 2.9* 3.7 3.7   < > 2.9*   CL 99 99 102 99   < > 103   CO2 30 28 29 27   < > 22   ANIONGAP 13 17 13 16   < > 14   BUN 6 10 8 8   < > 5*   CREATININE 0.54 0.51 0.40* 0.57   < > 0.62   EGFR >90 >90 >90 >90   < > >90   CALCIUM 10.3 10.4 9.7 9.8   < > 9.6   ALBUMIN 4.5 4.6  --  3.8   < > 4.2   MG  --   --  1.74 1.86  --  1.79    < > = values in this interval not displayed.   , LFT Trend:   Recent Labs     06/24/25  1116 06/02/25  1354 05/01/25  0717 12/02/24  0958   ALBUMIN 4.5 4.6 3.8 4.0   BILITOT 0.5 1.1  --  0.9   ALKPHOS 61 51  --  87   ALT 14 16  --  21   AST 19 23  --  39   PROT 6.8 7.0  --  6.5   , DM Specific Labs Trend (Includes HgbA1C, antibodies & fasting insulin):   Recent Labs     12/02/24  0958   HGBA1C 5.2   , Lipid Panel Trend:    Recent Labs     12/02/24  0958 02/15/20  0755 06/15/19  0840 10/19/18  1640   CHOL 225* 174 236* 148   HDL 50.6 65.8 51.6 65.6   LDLCALC 151*  --   --   --    LDLF  --  95 144* 69   VLDL 23 13 40 14   TRIG 116 65 201* 68   , Iron Panel + Serum Ferritin Trend:   Recent Labs     08/04/19  0859   IRON 140   TIBC 413   IRONSAT 34   FERRITIN 144   , Vitamin B12:   Lab Results   Component Value Date    TRYDZNVT39 276 12/02/2024    , Folate: No results found for: \"FOLATE\" , Vitamin D:   Lab Results   Component Value Date    VITD25 29 (A) 02/15/2020    , and CRP Trend (last 3): No results found for: \"HSCRP\"     Anthropometrics:                            Weight History: 20% wt loss x 7 months   33% loss from UBW (unspecified time frame)  Wt " "Readings from Last 20 Encounters:   07/09/25 (!) 37.2 kg (82 lb 1.6 oz)   07/08/25 (!) 37.6 kg (82 lb 14.4 oz)   06/17/25 (!) 36.7 kg (81 lb)   06/06/25 (!) 35.3 kg (77 lb 13.2 oz)   06/04/25 (!) 38.1 kg (83 lb 15.9 oz)   06/03/25 (!) 36.5 kg (80 lb 7.5 oz)   06/02/25 (!) 36.1 kg (79 lb 8 oz)   05/21/25 (!) 37.2 kg (82 lb)   05/20/25 (!) 37.6 kg (83 lb)   05/29/25 (!) 38.6 kg (85 lb)   04/30/25 (!) 39 kg (85 lb 15.7 oz)   04/15/25 (!) 39 kg (86 lb)   03/21/25 (!) 38.9 kg (85 lb 12.8 oz)   03/19/25 (!) 38.1 kg (84 lb)   02/11/25 (!) 39.5 kg (87 lb)   01/30/25 (!) 40.9 kg (90 lb 2.7 oz)   01/23/25 (!) 42 kg (92 lb 9.6 oz)   01/21/25 (!) 42.4 kg (93 lb 6.4 oz)   12/19/24 (!) 44 kg (97 lb)   12/02/24 (!) 43.8 kg (96 lb 9.6 oz)       Weight Change %:       Nutrition History:  Food and Nutrient History  Food and Nutrient History: Patient reports that she just had her PEG removed yesterday.  She states that it was causing her discomfort and it \"made me sick\"  She reports that her taste came back and her oral intake is improving.  In addition, she's focusing on high calorie oral supplement shakes bid.  She is doing [employer] Protein Plus mixed with 2 oz. milk and 1 pkt CIB essentials 518 cals , 36 g protein).  For the 2nd shake she just does the bottle without any additives (340 cals/30 g protein).  She is eating 2-4 small meals/snacks (soft foods, as she is edentuolous) throught the day. She drinks about 6 cups water for fluid.   She is eating fish at least 5 days a week (i.e [place], tilapia, tuna, [place], etc) She can also tolerate ice cream, peaches, green beans, tender meats , potatoes, pringles, asparagus.   Denies n/v/d.  She did have some time away from treatment. Swallowing/odynophagia improved since undergoing treatment for thrush. Weight up 5 lbs from lowest weight but now stabilized x 3 weeks.  Energy Intake: Good > 75 % (Includes 2 high calorie supplement shakes)      Food Intake  Amount of Food: ~1385 cals, " 77 g protein  Meal 1: [employer] pro plus mixed with 2 oz milk, CIB powder packet  Meal 2: 3/4 fried bologna sandwich with cheese and mustard  Snacks : Jello with fruit container  Meal 3: 1/2 sausage link with Green beans  Snacks: Banana popsicle; [employer] Protein Plus              Current Medications[2]     Nutrition Focused Physical Exam Findings:    Subcutaneous Fat Loss  Orbital Fat Pads: Severe (dark circles, hollowing and loose skin)  Buccal Fat Pads: Severe (hollow, sunken and narrow face)  Ribs: Defer    Muscle Wasting  Temporalis: Severe (hollowed scooping depression)  Pectoralis (Clavicular Region): Severe (protruding prominent clavicle)  Deltoid/Trapezius: Defer  Interosseous: Defer  Quadriceps: Defer    Physical Findings  Teeth Findings: Edentulous         Estimated Needs:       Estimated Energy Needs  Total Energy Estimated Needs in 24 hours (kCal):  (1500+ (40kcal/kg))  Estimated Protein Needs  Total Protein Estimated Needs in 24 Hours (g):  (60-75 (1.5-2.0 g/kg))  Estimated Fluid Needs  Total Fluid Estimated Needs in 24 Hours (mL):  (1200+ (32 mls/kg))             Nutrition Diagnosis   Malnutrition Diagnosis  Patient has Malnutrition Diagnosis: Yes  Diagnosis Status: Active  Malnutrition Diagnosis: Severe malnutrition related to chronic disease or condition  Related to: SE's of paratracheal mass and cancer treatment, and inadequate intake r/t esophageal dysphagia/esophageal obstruction (food impaction).  As Evidenced by: significant weight loss of 19.4% in 6 months, physical findings per [facility]. and inadequate  r/t dysphagia w/ S/P PEG placement. (Patient with recent weight increase from lowest weight and now stable x 3 weeks)    Nutrition Diagnosis  Patient has Nutrition Diagnosis: Yes  Diagnosis Status (1): Active  Nutrition Diagnosis 1: Swallowing difficulty  Related to (1): paratracheal mass (small cell carcinoma) and esophageal dysphagia with food impaction  As Evidenced by (1):  "inadequate intake d/t esophageal dysphagia w/ food impaction (Now improving, PEG removed yesterday)       Nutrition Interventions/Recommendations   Nutrition Prescription: Individualized Nutrition Prescription Provided for : Oral nutrition     Recommendations:      Nutrition Interventions:   Food and Nutrient Delivery: Food and Nutrition Delivery  Meals & Snacks: Energy-modified diet, Texture-Modified Diet, Fluid-modified diet, Protein-modified diet  Goals: 2-3 high calorie oral supplement shakes, optimizing calorie intake at meals/snacks     Coordination of Care: Coordination of Nutrition Care by a Nutrition Professional  Collaboration and referral of nutrition care: Collaboration and Referral of Nutrition Care     Nutrition Education:   Nutrition Education Content: Nutrition Education Content: Content related nutrition education   Encouraged patient to consume at least 2 high calorie smoothies/shakes per day (with ~500 +calories, 30 g protein in each).    -Pt prefers [employer] Pro Plus with addition of [place] packet and 2 oz. Milk (518 calories, 36 g protein)   Encouraged patient to add [employer] Instant Breakfast packet to 2nd shake to further optimize calorie intake (as she was only including in morning shake)   Patient needs ~1500 cals consistently each day for weight gain (40 cals/kg) , seems to be holding stable when consuming 7310-4778 cals (35 cals/kg)   -Suggested substituting some fluid intake with higher calorie beverages as able (I.e pear/peach nectar, glass of milk, sports drinks, etc)  -Encouraged use of condiments to optimize calories (I.e 1 T olive oil or butter to cooked veggies, starches,  cheese to sandwiches or potatoes, sour cream, honey, peanut butter etc)   -Provided handout \"High Calorie Meal and Snack Ideas\"     -Encouraged additional calorie dense snacks (full-fat yogurt or cottage cheese, baked/sweet potatoes with toppings, creamy soups such as cream of potato ; cream of broccoli " etc, oatmeal made with milk, etc.  -Continue vitamin D daily  -Encouraged Omega 3 fatty acid intake (patient already consuming fatty fish ~3 days/week thus will hold off on EPA supplement) for cancer cachexia.               Nutrition Monitoring and Evaluation   Food and Nutrient Intake  Monitoring and Evaluation Plan: Energy intake, Protein intake, Fluid intake  Energy Intake: Estimated energy intake  Criteria: Meet 100% estimated nico needs  Fluid Intake:  (50+ oz caffeine free fluids daily)  Estimated protein intake: Estimated protein intake  Criteria: Meet >75% estimated protein needs    Anthropometric measurements  Monitoring and Evaluation Plan: Weight  Body Weight: Measured body weight  Criteria: Weight gain         Nutrition Focused Physical Findings  Monitoring and Evaluation Plan: Muscle, Adipose  Criteria: Improving fat stores  Muscle Finding: Muscle atrophy  Criteria: Improved atrophy         Follow Up:  will follow throughout treatment.  Patient has RDN card and can call with any additional questions/concerns.                 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    Abnormal ECG    Allergic rhinitis    Disorder of upper respiratory system    Hyperlipidemia    Hypertension    Migraine headache    Palpitations    Tobacco use disorder    Esophageal obstruction due to food impaction    Esophageal candidiasis (Multi)   [2]   Current  Outpatient Medications:     acetaminophen (Tylenol) 500 mg tablet, Take 2 tablets (1,000 mg) by mouth every 8 hours if needed for mild pain (1 - 3) or moderate pain (4 - 6)., Disp: 30 tablet, Rfl: 0    aspirin 81 mg EC tablet, Take 1 tablet (81 mg) by mouth once daily., Disp: , Rfl:     cholecalciferol (Vitamin D3) 25 MCG (1000 UT) tablet, Take 2 tablets (2,000 Units) by mouth 5 times a day., Disp: , Rfl:     ibuprofen 600 mg tablet, Take 1 tablet (600 mg) by mouth every 6 hours. (Patient not taking: Reported on 7/9/2025), Disp: 30 tablet, Rfl: 0    ibuprofen 800 mg tablet, Take 1 tablet (800 mg) by mouth every 8 hours if needed for mild pain (1 - 3)., Disp: 90 tablet, Rfl: 2    levothyroxine (Synthroid, Levoxyl) 88 mcg tablet, Take 1 tablet (88 mcg) by mouth early in the morning.. Take on an empty stomach at the same time each day, either 30 to 60 minutes prior to breakfast, Disp: 90 tablet, Rfl: 1    nystatin (Mycostatin) 100,000 unit/mL suspension, Swish and swallow 5 mL (500,000 Units) 4 times a day., Disp: 200 mL, Rfl: 2    OLANZapine (ZyPREXA) 5 mg tablet, Take 1 tablet (5 mg) by mouth once daily at bedtime. For 4 days starting the evening of treatment., Disp: 4 tablet, Rfl: 5    ondansetron (Zofran) 8 mg tablet, Take 1 tablet (8 mg) by mouth every 8 hours if needed for nausea or vomiting., Disp: 30 tablet, Rfl: 5    prochlorperazine (Compazine) 10 mg tablet, Take 1 tablet (10 mg) by mouth every 6 hours if needed for nausea or vomiting., Disp: 30 tablet, Rfl: 5  No current facility-administered medications for this visit.    Facility-Administered Medications Ordered in Other Visits:     albuterol 2.5 mg /3 mL (0.083 %) nebulizer solution 3 mL, 3 mL, nebulization, PRN, Bhavesh Johansen MD    dextrose 5 % in water (D5W) bolus 500 mL, 500 mL, intravenous, PRN, Bhavesh Johansen MD    diphenhydrAMINE (BENADryl) injection 50 mg, 50 mg, intravenous, PRN, Bhavesh Johansen MD    EPINEPHrine (Epipen) injection  syringe 0.3 mg, 0.3 mg, intramuscular, q5 min PRN, Bhavesh Johansen MD    famotidine PF (Pepcid) injection 20 mg, 20 mg, intravenous, Once PRN, Bhavesh Johansen MD    methylPREDNISolone sod succinate (SOLU-Medrol) 40 mg/mL injection 40 mg, 40 mg, intravenous, PRN, Bhavesh Johansen MD    prochlorperazine (Compazine) injection 10 mg, 10 mg, intravenous, q6h PRN, Bhavesh Johansen MD    prochlorperazine (Compazine) tablet 10 mg, 10 mg, oral, q6h PRN, Bhavesh Johansen MD    sodium chloride 0.9 % bolus 500 mL, 500 mL, intravenous, PRN, Bhavesh Johansen MD

## 2025-07-09 NOTE — PROGRESS NOTES
Chief complaint:  PEG tube removal.    History Of Present Illness  Jaja Bradshaw is a 70 y.o. female who is here for her first post-op visit after an EGD and PEG tube placement on 6/6/25. She is s/p a robotic right paratracheal mass resection on 4/30/25 for a [id number] small cell.     She is undergoing infusions and tolerating ok though states her myalgias continue to worsen significantly. She is seeing Neurology soon. She has been able to tolerate increasing amount of PO and has not been able to tolerate tube feeds through her PEG as they cause significant bloating/diarrhea, and regurgitation.     By way of review patient was referred by her PCP, Fide Alonzo.Patient reports she had been having several generalized issues including diffuse muscle aches which started in her legs but have started to occur all over. She has also had ~30lb weight loss over the last 3-4 months. She does admit that she previously had dentures fitted which she cannot use secondary to discomfort and increased size. She also had previously lost her sense of taste.  Underwent a cervical mediastinoscopy for a paratracheal lesion, initial biopsies showed non-necrotizing granulomas only however ongoing growth of lesion prompted complete resection    No history of MI or CVA. Could walk a mile or climb 2 flights of stairs: yes     Past Medical History  She has a past medical history of COPD (chronic obstructive pulmonary disease) (Multi), Disease of thyroid gland, and PONV (postoperative nausea and vomiting).    Surgical History  She has a past surgical history that includes Other surgical history (04/17/2020); Mediastinotomy (N/A, 01/30/2025); and Esophagogastroduodenoscopy w/ PEG (N/A, 06/06/2025).     Social History  She reports that she has been smoking cigarettes. She started smoking about 45 years ago. She has a 22.8 pack-year smoking history. She has been exposed to tobacco smoke. She has never used smokeless tobacco. She reports current  alcohol use of about 8.0 standard drinks of alcohol per week. She reports that she does not use drugs.    Family History  Family history of cancer: Yes  Family History   Problem Relation Name Age of Onset    Liver cancer Mother          Medications    Current Outpatient Medications:     acetaminophen (Tylenol) 500 mg tablet, Take 2 tablets (1,000 mg) by mouth every 8 hours if needed for mild pain (1 - 3) or moderate pain (4 - 6)., Disp: 30 tablet, Rfl: 0    aspirin 81 mg EC tablet, Take 1 tablet (81 mg) by mouth once daily., Disp: , Rfl:     cholecalciferol (Vitamin D3) 25 MCG (1000 UT) tablet, Take 2 tablets (2,000 Units) by mouth 5 times a day., Disp: , Rfl:     ibuprofen 800 mg tablet, Take 1 tablet (800 mg) by mouth every 8 hours if needed for mild pain (1 - 3)., Disp: 90 tablet, Rfl: 2    levothyroxine (Synthroid, Levoxyl) 88 mcg tablet, Take 1 tablet (88 mcg) by mouth early in the morning.. Take on an empty stomach at the same time each day, either 30 to 60 minutes prior to breakfast, Disp: 90 tablet, Rfl: 1    nystatin (Mycostatin) 100,000 unit/mL suspension, Swish and swallow 5 mL (500,000 Units) 4 times a day., Disp: 200 mL, Rfl: 2    OLANZapine (ZyPREXA) 5 mg tablet, Take 1 tablet (5 mg) by mouth once daily at bedtime. For 4 days starting the evening of treatment., Disp: 4 tablet, Rfl: 5    ondansetron (Zofran) 8 mg tablet, Take 1 tablet (8 mg) by mouth every 8 hours if needed for nausea or vomiting., Disp: 30 tablet, Rfl: 5    prochlorperazine (Compazine) 10 mg tablet, Take 1 tablet (10 mg) by mouth every 6 hours if needed for nausea or vomiting., Disp: 30 tablet, Rfl: 5    ibuprofen 600 mg tablet, Take 1 tablet (600 mg) by mouth every 6 hours. (Patient not taking: Reported on 7/9/2025), Disp: 30 tablet, Rfl: 0  No current facility-administered medications for this visit.    Facility-Administered Medications Ordered in Other Visits:     albuterol 2.5 mg /3 mL (0.083 %) nebulizer solution 3 mL, 3 mL,  "nebulization, PRN, Bhavesh Johansen MD    CARBOplatin (Paraplatin) 280 mg in sodium chloride 0.9% 138 mL IV, 280 mg, intravenous, Once, Kaiden Chapman, APRN-CNP, Last Rate: 276 mL/hr at 07/09/25 1145, 280 mg at 07/09/25 1145    dextrose 5 % in water (D5W) bolus 500 mL, 500 mL, intravenous, PRN, Bhavesh Johansen MD    diphenhydrAMINE (BENADryl) injection 50 mg, 50 mg, intravenous, PRN, Bhavesh Johansen MD    EPINEPHrine (Epipen) injection syringe 0.3 mg, 0.3 mg, intramuscular, q5 min PRN, Bhavesh Johansen MD    etoposide (Toposar) 98 mg in sodium chloride 0.9% 271.9 mL IV, 75 mg/m2 (Treatment Plan Recorded), intravenous, Once, Bhavesh Johansen MD    famotidine PF (Pepcid) injection 20 mg, 20 mg, intravenous, Once PRN, Bhavesh Johansen MD    methylPREDNISolone sod succinate (SOLU-Medrol) 40 mg/mL injection 40 mg, 40 mg, intravenous, PRN, Bhavesh Johansen MD    prochlorperazine (Compazine) injection 10 mg, 10 mg, intravenous, q6h PRN, Bhavesh Johansen MD    prochlorperazine (Compazine) tablet 10 mg, 10 mg, oral, q6h PRN, Bhavesh Johansen MD    sodium chloride 0.9 % bolus 500 mL, 500 mL, intravenous, PRN, Bhavesh Johansen MD    Allergies  Latex and Codeine    Review of Systems:  Review of Systems   Constitutional: No fevers, chills, unexpected weight change  HENT: No sore throat, congestion, or nasal drainage  Eyes: No visual changes or eye itching  Respiratory:  see HPI. No cough, worsening dyspnea, wheezing  Cardiac: No chest pain, palpitations, or lower extremity edema  Gastrointestinal: No nausea, vomiting, diarrhea. No abdominal pain  Genitourinary: No dysuria or hematuria  Musculoskeletal: No back pain. No significant myalgias or arthralgias  Neurologic: No headaches, dizziness, or seizures.  Hematologic: No easy bleeding or bruising.  Psychiatric: No anxiety or depression.    Physical Exam:  Physical Exam  /87   Pulse 109   Temp 36.4 °C (97.5 °F)   Ht 1.626 m (5' 4\")   Wt (!) 37.6 kg (82 lb " "14.4 oz)   SpO2 99%   BMI 14.23 kg/m²   Constitutional:       General: Patient is not in acute distress.     Appearance: Normal appearance; not ill-appearing.   HENT:      Head: Normocephalic.      Nose: No congestion or rhinorrhea.   Neck: Cervical mediastinoscopy site C/D/I  Cardiovascular:      Rate and Rhythm: Normal rate and regular rhythm.      Pulses: Normal pulses.   Pulmonary:      Effort: Pulmonary effort is normal. No respiratory distress.  No conversational dyspnea     Breath sounds: No stridor. No wheezing.   Abdominal:      General: There is no distension. PEG tube removed without difficulty, site cauterized, bandage placed.     Palpations: Abdomen is soft.      Tenderness: There is no abdominal tenderness.   Musculoskeletal:         General: No swelling, tenderness or deformity. Normal range of motion.      Cervical back: Normal range of motion. No rigidity.   Lymphadenopathy:      Cervical: No cervical adenopathy.   Skin:     General: Skin is warm and dry.   Neurological:      General: No focal deficit present.      Mental Status: Patient is alert and oriented to person, place, and time.   Psychiatric:         Mood and Affect: Mood normal.     Relevant Results    Pathology:Surgical Pathology Exam: [id number]  Order: [id number]   Collected 4/30/2025 09:17       Status: Edited Result - FINAL       Dx: Mediastinal mass    Test Result Released: Yes (not seen)    0 Result Notes       View Follow-Up Encounter      Component    FINAL DIAGNOSIS      A. LYMPH NODE, SUPERIOR VENA CAVA, EXCISION:  - One lymph node with no evidence of carcinoma (0/1).     B. LYMPH NODE, LEVEL 10, EXCISION:  - Focal small cell carcinoma, involving lymph node tissue.      C. LYMPH NODE, ELANA-AORTIC, EXCISION:  - One lymph node with no evidence of carcinoma (0/1).     D. SOFT TISSUE, DESIGNATED \"PARATRACHEAL MASS\", EXCISION:  - Small cell carcinoma. See note.  - Tumor extends focally to disrupted inked parenchymal edge.      Note: " "H&E sections of this mass demonstrate a well-circumscribed nodule with a rim of lymphoid tissue and what appears to be lymph node capsule (D2).  Within the nodule there is a population of small round blue cell tumors with the morphology of the small cell carcinoma intermixed with granulomatous inflammation.  By immunohistochemical staining, the tumor cells are positive for CAM 5.2, TTF 1 (SPT24) (weak), INSM1, and chromogranin.  Ki-67 proliferation index is high, approximately 70%.  Retinoblastoma expression is globally lost within the tumor cells.  Tumor cells are negative for AE1/AE3, CD20, CD3, p40, S100, and CD1a.  CD68 highlights intranodal histiocytes.  By morphology and immunohistochemical staining, the findings are favored to represent metastatic small cell carcinoma in enlarged lymph node. Clinical correlation in regards to the primary is recommended. AFB and GMS will be ordered and reported in an addendum.     Of note: The previous lymph node biopsy slides ([id number]) were reviewed, and show no evidence of small cell carcinoma.     Staff consultants: Nirali Contreras, Raghu Siegel.   Electronically signed by Lisseth Griffith DO on 5/16/2025 at 1321 EDT        By the signature on this report, the individual or group listed as making the Final Interpretation/Diagnosis certifies that they have reviewed this case.    Addendum   AFB and GMS, performed on block D3, are negative for acid-fast bacilli and fungal organisms, respectively.      Addendum electronically signed by Lisseth Griffith DO on 5/27/2025 at 1514 EDT   Clinical History    Pre-op diagnosis:  Mediastinal mass [J98.59]   Gross Description    A: Received in formalin, labeled with the patient's name and hospital number and \"superior vena cava *\", is a segment of adipose tissue with possible scant grey-black lymph node tissue measuring 0.8 x 0.5 x 0.1 cm.  The specimen is submitted in toto in one cassette.  JLL  B: Received in formalin, labeled with " "the patient's name and hospital number and \"level 10 lymph node\", are 3 grey-black lymph node fragments aggregating to 2.0 x 0.8 x 0.3 cm.  The specimen is submitted in toto in one cassette.  JLL  C: Received in formalin, labeled with the patient's name and hospital number and \"abdulkadir-aortic lymph node\", is a grey-black lymph node measuring 0.6 x 0.5 x 0.3 cm.  The specimen is bisected and submitted entirely in one cassette.  JLL  D: Received in formalin, labeled with the patient's name and hospital number and \"paratracheal mass\", is an encapsulated soft tissue mass measuring 5.3 x 3.5 x 3.3 cm and weighing 36.2 grams.  A defect is present and is inked orange.  The specimen is inked blue and sectioned to reveal pale tan to pink and focally chalky cut surfaces.  Representative sections are submitted in 5 cassettes.  [facility]   Disclaimer    One or more of the reagents used to perform assays on this specimen MAY have contained components considered to be analyte specific reagents (ASR's).  ASR's have not been cleared or approved by the U.S. Food and Drug Administration.  These assays were developed and their performance characteristics determined by the Department of Pathology at [facility]. The FDA does not require this test to go through premarket FDA review. This test is used for clinical purposes. It should not be regarded as investigational or for research. This laboratory is certified under the Clinical Laboratory Improvement Amendments (CLIA) as qualified to perform high complexity clinical laboratory testing.  The assays were performed with appropriate positive and negative controls which stained appropriately.   Resulting Agency [facility]          Imaging:  Pulmonary Functions Testing Results:    No results found for: \"FEV1\", \"FVC\", \"TOI6GER\", \"TLC\", \"DLCO\"    No new     Assessment/Plan   Problem List Items Addressed This Visit           ICD-10-CM    Metastatic small cell carcinoma to " lymph node - Primary C77.9         Ms. Bradshaw is a 70 year old female, hx [id number] small cell carcinoma, who presents today for PEG tube removal given ongoing issues tolerating tube feeds. She has thankfully been able to take more PO though no significant weight gain/she is at least stable. Continue infusions per Oncology and follow up with Neurology as scheduled. She can call me with any questions or concerns.       Vickie Gonzalez, DO  Thoracic & Esophageal Surgery

## 2025-07-10 ENCOUNTER — INFUSION (OUTPATIENT)
Dept: HEMATOLOGY/ONCOLOGY | Facility: CLINIC | Age: 70
End: 2025-07-10
Payer: MEDICARE

## 2025-07-10 ENCOUNTER — APPOINTMENT (OUTPATIENT)
Dept: RHEUMATOLOGY | Facility: CLINIC | Age: 70
End: 2025-07-10
Payer: MEDICARE

## 2025-07-10 VITALS
WEIGHT: 84.33 LBS | BODY MASS INDEX: 14.47 KG/M2 | OXYGEN SATURATION: 97 % | SYSTOLIC BLOOD PRESSURE: 141 MMHG | HEART RATE: 89 BPM | TEMPERATURE: 97.7 F | RESPIRATION RATE: 18 BRPM | DIASTOLIC BLOOD PRESSURE: 77 MMHG

## 2025-07-10 DIAGNOSIS — C77.9 METASTATIC SMALL CELL CARCINOMA TO LYMPH NODE: ICD-10-CM

## 2025-07-10 PROCEDURE — 96375 TX/PRO/DX INJ NEW DRUG ADDON: CPT | Mod: INF

## 2025-07-10 PROCEDURE — 2500000004 HC RX 250 GENERAL PHARMACY W/ HCPCS (ALT 636 FOR OP/ED): Performed by: STUDENT IN AN ORGANIZED HEALTH CARE EDUCATION/TRAINING PROGRAM

## 2025-07-10 PROCEDURE — 96413 CHEMO IV INFUSION 1 HR: CPT

## 2025-07-10 RX ORDER — FAMOTIDINE 10 MG/ML
20 INJECTION, SOLUTION INTRAVENOUS ONCE AS NEEDED
Status: DISCONTINUED | OUTPATIENT
Start: 2025-07-10 | End: 2025-07-10 | Stop reason: HOSPADM

## 2025-07-10 RX ORDER — ALBUTEROL SULFATE 0.83 MG/ML
3 SOLUTION RESPIRATORY (INHALATION) AS NEEDED
Status: DISCONTINUED | OUTPATIENT
Start: 2025-07-10 | End: 2025-07-10 | Stop reason: HOSPADM

## 2025-07-10 RX ORDER — DIPHENHYDRAMINE HYDROCHLORIDE 50 MG/ML
50 INJECTION, SOLUTION INTRAMUSCULAR; INTRAVENOUS AS NEEDED
Status: DISCONTINUED | OUTPATIENT
Start: 2025-07-10 | End: 2025-07-10 | Stop reason: HOSPADM

## 2025-07-10 RX ORDER — PROCHLORPERAZINE MALEATE 10 MG
10 TABLET ORAL EVERY 6 HOURS PRN
Status: DISCONTINUED | OUTPATIENT
Start: 2025-07-10 | End: 2025-07-10 | Stop reason: HOSPADM

## 2025-07-10 RX ORDER — PROCHLORPERAZINE EDISYLATE 5 MG/ML
10 INJECTION INTRAMUSCULAR; INTRAVENOUS EVERY 6 HOURS PRN
Status: DISCONTINUED | OUTPATIENT
Start: 2025-07-10 | End: 2025-07-10 | Stop reason: HOSPADM

## 2025-07-10 RX ORDER — EPINEPHRINE 0.3 MG/.3ML
0.3 INJECTION SUBCUTANEOUS EVERY 5 MIN PRN
Status: DISCONTINUED | OUTPATIENT
Start: 2025-07-10 | End: 2025-07-10 | Stop reason: HOSPADM

## 2025-07-10 RX ADMIN — SODIUM CHLORIDE 98 MG: 0.9 INJECTION, SOLUTION INTRAVENOUS at 16:10

## 2025-07-10 RX ADMIN — DEXAMETHASONE SODIUM PHOSPHATE 8 MG: 4 INJECTION INTRA-ARTICULAR; INTRALESIONAL; INTRAMUSCULAR; INTRAVENOUS; SOFT TISSUE at 15:31

## 2025-07-10 ASSESSMENT — PAIN SCALES - GENERAL: PAINLEVEL_OUTOF10: 0-NO PAIN

## 2025-07-11 ENCOUNTER — INFUSION (OUTPATIENT)
Dept: HEMATOLOGY/ONCOLOGY | Facility: CLINIC | Age: 70
End: 2025-07-11
Payer: MEDICARE

## 2025-07-11 VITALS
TEMPERATURE: 98.8 F | SYSTOLIC BLOOD PRESSURE: 126 MMHG | BODY MASS INDEX: 14.47 KG/M2 | RESPIRATION RATE: 18 BRPM | WEIGHT: 84.3 LBS | OXYGEN SATURATION: 96 % | DIASTOLIC BLOOD PRESSURE: 77 MMHG | HEART RATE: 93 BPM

## 2025-07-11 DIAGNOSIS — C77.9 METASTATIC SMALL CELL CARCINOMA TO LYMPH NODE: ICD-10-CM

## 2025-07-11 PROCEDURE — 2500000004 HC RX 250 GENERAL PHARMACY W/ HCPCS (ALT 636 FOR OP/ED): Performed by: STUDENT IN AN ORGANIZED HEALTH CARE EDUCATION/TRAINING PROGRAM

## 2025-07-11 PROCEDURE — 96413 CHEMO IV INFUSION 1 HR: CPT

## 2025-07-11 PROCEDURE — 96377 APPLICATON ON-BODY INJECTOR: CPT

## 2025-07-11 PROCEDURE — 96375 TX/PRO/DX INJ NEW DRUG ADDON: CPT | Mod: INF

## 2025-07-11 RX ORDER — EPINEPHRINE 0.3 MG/.3ML
0.3 INJECTION SUBCUTANEOUS EVERY 5 MIN PRN
Status: DISCONTINUED | OUTPATIENT
Start: 2025-07-11 | End: 2025-07-11 | Stop reason: HOSPADM

## 2025-07-11 RX ORDER — ALBUTEROL SULFATE 0.83 MG/ML
3 SOLUTION RESPIRATORY (INHALATION) AS NEEDED
Status: DISCONTINUED | OUTPATIENT
Start: 2025-07-11 | End: 2025-07-11 | Stop reason: HOSPADM

## 2025-07-11 RX ORDER — ONDANSETRON HYDROCHLORIDE 2 MG/ML
8 INJECTION, SOLUTION INTRAVENOUS ONCE
Status: COMPLETED | OUTPATIENT
Start: 2025-07-11 | End: 2025-07-11

## 2025-07-11 RX ORDER — PROCHLORPERAZINE EDISYLATE 5 MG/ML
10 INJECTION INTRAMUSCULAR; INTRAVENOUS EVERY 6 HOURS PRN
Status: DISCONTINUED | OUTPATIENT
Start: 2025-07-11 | End: 2025-07-11 | Stop reason: HOSPADM

## 2025-07-11 RX ORDER — FAMOTIDINE 10 MG/ML
20 INJECTION, SOLUTION INTRAVENOUS ONCE AS NEEDED
Status: DISCONTINUED | OUTPATIENT
Start: 2025-07-11 | End: 2025-07-11 | Stop reason: HOSPADM

## 2025-07-11 RX ORDER — DIPHENHYDRAMINE HYDROCHLORIDE 50 MG/ML
50 INJECTION, SOLUTION INTRAMUSCULAR; INTRAVENOUS AS NEEDED
Status: DISCONTINUED | OUTPATIENT
Start: 2025-07-11 | End: 2025-07-11 | Stop reason: HOSPADM

## 2025-07-11 RX ORDER — PROCHLORPERAZINE MALEATE 10 MG
10 TABLET ORAL EVERY 6 HOURS PRN
Status: DISCONTINUED | OUTPATIENT
Start: 2025-07-11 | End: 2025-07-11 | Stop reason: HOSPADM

## 2025-07-11 RX ADMIN — SODIUM CHLORIDE 98 MG: 0.9 INJECTION, SOLUTION INTRAVENOUS at 16:32

## 2025-07-11 RX ADMIN — DEXAMETHASONE SODIUM PHOSPHATE 8 MG: 4 INJECTION INTRA-ARTICULAR; INTRALESIONAL; INTRAMUSCULAR; INTRAVENOUS; SOFT TISSUE at 15:57

## 2025-07-11 RX ADMIN — PEGFILGRASTIM-CBQV 6 MG: 6 INJECTION, SOLUTION SUBCUTANEOUS at 16:35

## 2025-07-11 RX ADMIN — ONDANSETRON 8 MG: 2 INJECTION INTRAMUSCULAR; INTRAVENOUS at 15:59

## 2025-07-11 ASSESSMENT — PAIN SCALES - GENERAL: PAINLEVEL_OUTOF10: 0-NO PAIN

## 2025-07-11 NOTE — PATIENT INSTRUCTIONS
Your doctor has ordered a medicine called Udenyca.   This medicine may help lower your chances of getting an infection after certain types of chemo.   Udenyca is given through a device called an on-body injector. The device is placed on your arm at your infusion visit. About 27 hours after it is placed, the device gives you the medicine.     To learn more, watch the education video at [email or web address] or scan the QR code.

## 2025-07-16 ENCOUNTER — TELEPHONE (OUTPATIENT)
Dept: HEMATOLOGY/ONCOLOGY | Facility: CLINIC | Age: 70
End: 2025-07-16
Payer: MEDICARE

## 2025-07-16 ENCOUNTER — NURSE TRIAGE (OUTPATIENT)
Dept: ADMISSION | Facility: HOSPITAL | Age: 70
End: 2025-07-16
Payer: MEDICARE

## 2025-07-16 ENCOUNTER — APPOINTMENT (OUTPATIENT)
Dept: HEMATOLOGY/ONCOLOGY | Facility: CLINIC | Age: 70
End: 2025-07-16
Payer: MEDICARE

## 2025-07-16 NOTE — TELEPHONE ENCOUNTER
Spouse called regarding ongoing, generalized sharp, shooting pain all over.  Pain has been nearly constant since 7/11.  She remains able to walk and complete ADL's but sleep has been very disrupted due to pain.  She has tried tylenol and ibuprofen with no relief.  Spouse asks if there is anything else she can try?     He also notes she is struggling with constipation.  Her last regular, easily passed BM was a week ago.  She has tried miralax, dulcolax and a suppository and only had small, hard stools or some runny stool after the suppository.  She is eating and drinking without nausea/vomiting and passing gas.  He is looking for recommendation on what else to take?    She has EMG and neurology appt tomorrow.    Preferred pharmacy is [facility] in  [place] if any prescriptions are sent in for her.

## 2025-07-16 NOTE — TELEPHONE ENCOUNTER
Ongoing neuropathic pain. Patient tired and frustrated. Nothing is helping. She has EMG and sees neurology tomorrow.   Constipation is a life long issue. MiraLax works. She will titrate the dose.

## 2025-07-17 ENCOUNTER — HOSPITAL ENCOUNTER (OUTPATIENT)
Facility: CLINIC | Age: 70
Discharge: HOME | End: 2025-07-17
Payer: MEDICARE

## 2025-07-17 ENCOUNTER — OFFICE VISIT (OUTPATIENT)
Dept: NEUROLOGY | Facility: CLINIC | Age: 70
End: 2025-07-17
Payer: MEDICARE

## 2025-07-17 ENCOUNTER — APPOINTMENT (OUTPATIENT)
Dept: HEMATOLOGY/ONCOLOGY | Facility: CLINIC | Age: 70
End: 2025-07-17
Payer: MEDICARE

## 2025-07-17 DIAGNOSIS — Z51.81 THERAPEUTIC DRUG MONITORING: ICD-10-CM

## 2025-07-17 DIAGNOSIS — G61.0: ICD-10-CM

## 2025-07-17 DIAGNOSIS — C77.9 SMALL CELL CARCINOMA OF LYMPH NODE: ICD-10-CM

## 2025-07-17 DIAGNOSIS — G89.4 CHRONIC PAIN SYNDROME: ICD-10-CM

## 2025-07-17 DIAGNOSIS — C80.1 SMALL CELL CARCINOMA: ICD-10-CM

## 2025-07-17 DIAGNOSIS — R29.898 WEAKNESS OF BOTH ARMS: ICD-10-CM

## 2025-07-17 DIAGNOSIS — C77.9 METASTATIC SMALL CELL CARCINOMA TO LYMPH NODE: ICD-10-CM

## 2025-07-17 DIAGNOSIS — J98.59 MEDIASTINAL MASS: ICD-10-CM

## 2025-07-17 DIAGNOSIS — G95.9 MYELOPATHY (MULTI): Primary | ICD-10-CM

## 2025-07-17 PROCEDURE — 95913 NRV CNDJ TEST 13/> STUDIES: CPT | Performed by: STUDENT IN AN ORGANIZED HEALTH CARE EDUCATION/TRAINING PROGRAM

## 2025-07-17 PROCEDURE — 95885 MUSC TST DONE W/NERV TST LIM: CPT | Performed by: STUDENT IN AN ORGANIZED HEALTH CARE EDUCATION/TRAINING PROGRAM

## 2025-07-17 PROCEDURE — 99417 PROLNG OP E/M EACH 15 MIN: CPT | Performed by: STUDENT IN AN ORGANIZED HEALTH CARE EDUCATION/TRAINING PROGRAM

## 2025-07-17 PROCEDURE — 99215 OFFICE O/P EST HI 40 MIN: CPT | Mod: 25 | Performed by: STUDENT IN AN ORGANIZED HEALTH CARE EDUCATION/TRAINING PROGRAM

## 2025-07-17 RX ORDER — SENNOSIDES 25 MG/1
1 TABLET, FILM COATED ORAL EVERY 6 HOURS PRN
Qty: 45 G | Refills: 11 | Status: ON HOLD | OUTPATIENT
Start: 2025-07-17 | End: 2025-08-16

## 2025-07-17 RX ORDER — PREGABALIN 75 MG/1
75 CAPSULE ORAL 2 TIMES DAILY
Qty: 60 CAPSULE | Refills: 2 | Status: ON HOLD | OUTPATIENT
Start: 2025-07-17 | End: 2025-10-15

## 2025-07-17 NOTE — PROGRESS NOTES
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 year old female patient referred by Dr. Bhavesh Johansen from oncology for progressive weakness.      Her symptoms began with 5+ months of progressive pain throughout the hands and feet which spread throughout her body.  Around this time she was losing a significant amount of weight over period of a few months.  She went on to have the diagnosis of lung cancer for which she is now undergoing evaluation and treatment.    In 2025 underwent a biopsy showing lymphoid tissue with rare nonnecrotizing granulomas.  She then underwent excision of the paratracheal mass presumed Stage IIIA with pathology noting small cell carcinoma of paratracheum/mediastinum.  She then underwent carboplatin and etoposide in 2025, and cycle 2 was delayed due to blood counts.  She also had a PEG tube for dysphagia.    Despite treatment, she has continued to have progression of pain throughout the body, dysesthesia to touching of the skin, generalized weakness, and a general gait imbalance and dysfunction.  Her symptoms may have worsened more on the right side of the body initially, but very quickly became symmetric. In addition she has movement induced vertigo and dizziness.    Problem List:  Problem List[1]     RX Allergies[2]     Medications:  Current Outpatient Medications   Medication Instructions    acetaminophen (TYLENOL) 1,000 mg, oral, Every 8 hours PRN    aspirin 81 mg EC tablet 1 tablet, Daily    cholecalciferol (VITAMIN D3) 2,000 Units, 5 times daily    ibuprofen 600 mg, oral, Every 6 hours scheduled    ibuprofen 800 mg, oral, Every 8 hours PRN    levothyroxine (SYNTHROID, LEVOXYL) 88 mcg, oral, Daily, Take on an empty stomach at the same time each day, either 30 to 60 minutes prior to  breakfast    lidocaine (Xylocaine) 5 % cream cream 1 Application, Topical, Every 6 hours PRN    nystatin (MYCOSTATIN) 500,000 Units, Swish & Swallow, 4 times daily    OLANZapine (ZYPREXA) 5 mg, oral, Nightly, For 4 days starting the evening of treatment.    ondansetron (ZOFRAN) 8 mg, oral, Every 8 hours PRN    pregabalin (LYRICA) 75 mg, oral, 2 times daily    prochlorperazine (COMPAZINE) 10 mg, oral, Every 6 hours PRN       Objective      There were no vitals taken for this visit.   Physical Exam  Neuromuscular Exam:  General Appearance:  No distress, alert, interactive and cooperative.   Musculoskeletal:  No scoliosis, lordosis, kyphosis, pes cavus, or hammertoes  Neurological:  Mental status: the patient provided an accurate history, language was normal.   Cranial Nerves:  EOM full range, no nystagmus   CN 7   Normal and symmetric facial strength. Nasolabial folds symmetric.   Able to lift eyebrows and close eye lids with eye lashes buried symmetrically.   CN 8   Hearing intact to conversation.     CN 12   Tongue midline, with normal bulk and strength; no fasciculations.     Motor:   Muscle bulk: Markedly reduced throughout, generalized  Muscle tone: Normal in both upper and lower extremities.  Manual Muscle Testing (MMT) reveals the following MRC grades:  R L   Shoulder abduction  5 5  Elbow flexion   5 5  Elbow extension  5 5  Wrist extension  5 5  Wrist flexion   5 5  Finger extension  4* 4*  Finger flex FDP (MED)  4* 4*  Finger flex FDP (ULN)  4* 4*  Finger abduction  4* 4*  Thumb abduction   4* 4*    Hip flexion   4 4  Knee flexion   5 5  Knee extension  5-* 5-*  Ankle dorsiflexion  5-* 5-*  Ankle plantarflexion  5-* 5-*  Big toe extension  4 4  Toe flexion   4 4    *Significant giveway and jerkiness    Reflexes:     R          L  BR:               T          T  Biceps:         T          T  Knee:           1          1  Ankle:          1          1    Babinski: Toes are likely upgoing bilaterally, however  patient seems to withdraw from discomfort  Stransky sign is also present bilaterally    Sensory:   Reduced sensation distally, in addition to allodynia and dysesthesia when touching the legs or hands.  With eyes closed there is increase in movement and tremor, likely mild pseudo athetosis.    Coordination:    Finger-to-nose with eyes open is normal, shaky and erratic with eyes closed.    Gait:   Unsteady, needs a walker, bases normal.  Romberg not performed due to safety.      The following results, labs, and/or imaging were personally reviewed and/or listed for reference: See above/below    Results  Labs from March 2025:  Creatine kinase 34  C-reactive protein less than 3, sed rate normal,    MRI of the brain from 5/2025 shows no intracranial metastatic disease, there is minimal microvascular changes.    EMG and neuromuscular ultrasound performed today and are reported separately.        Assessment/Plan      Jaja Bradshaw is a 70 y.o. female with recent diagnosis of small cell lung cancer (currently treated with resection and, carboplatin and etoposide) presenting with 5+ months of progressive pain and paresthesias throughout the body, associated with proximal and distal weakness, imbalance/ataxia, possible pseudo athetosis. Examination notable for distal upper extremity weakness, and a jerky giveaway pattern of lower extremity proximal and distal weakness.  Reflexes are present but trace, and Babinski signs are upgoing, although may be confounded by her discomfort and allodynia to touch of the feet.    EMG was performed and shows evidence of a sensory greater than motor peripheral neuropathy.  Of note the absence, and impersistent F wave responses together with diffuse abnormal sensory responses borderline demyelinating conduction velocities drop concern for proximal demyelinating process.  I performed a neuromuscular ultrasound which does show enlargement of the right greater than left brachial plexus roots,  trunks, and divisions.  This was to the greatest degree in the right C8, T1 and lower trunk level.  This may correspond to the low right median motor response with absent F-wave.      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  Although rare, demyelinating CIDP type, neuropathies have been described in various cancers, lung cancer being included.  The enlarged brachial plexus on ultrasound supports this diagnosis, however there is lack of definite demyelination on EMG, or conduction block.  The prominent sensory symptoms also draw concern for a paraneoplastic syndrome such as anti-Hu.  She will need an extensive workup including autoantibody testing.  In addition we will send for MRI of the spine given the upgoing Babinski signs however I doubt a central nervous system process.  Will include brachial plexus sequences to help exclude any infiltrative component to the abnormality seen on ultrasound.    For treatment, I will discuss with her oncologist, however would preference IVIG therapy +/- steroids as appropriate for her underlying cancer and other treatments.   Will also write for pregabalin, 5% lidocaine, and compounded gabapentin amitriptyline cream.  Will titrate pregabalin and add Cymbalta if needed.    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    Relevant Orders    MR B plexus w and wo IV contrast     Other Visit Diagnoses         Myelopathy (Multi)    -  Primary    Relevant Orders    MR cervical spine w and wo IV contrast    MR thoracic spine w and wo IV contrast      Acute sensory ataxic neuropathy (Multi)        Relevant Medications    pregabalin (Lyrica) 75 mg capsule    lidocaine (Xylocaine) 5 % cream cream    Other Relevant Orders    Ganglioside (GM1/GD1b/GQ1b) Abs, IgG/IgM    Encephalopathy-Autoimmune Evaluation,Serum    Vitamin B6    Vitamin B12    Vitamin E    Copper, Blood    Ceruloplasmin     Methylmalonic Acid    Point of Care Neuromuscular US    NEUROFASCINS 155 (IGG & IGM), NEUROFASCIN 140 (IGG), NEUROFASCIN 186 (IGG & IGM); RED OR SST 3ML SERUM;REFRIGERATE;WASHU, NEUROMUSCULAR - Miscellaneous Test Deferred    CHRONIC INFLAMMATORY DEMYELINATING POLYRADICULONEUROPATHY/NODOPATHY EVALUATION; RED OR SST; 3 ML SERUM(MIN 2.0) ML SERUM;REFRIGERATE;Transfer CIDP - Miscellaneous Test Deferred    [place]/Lambda Free Light Chain, Serum    Serum Protein Electrophoresis + Immunofixation      Therapeutic drug monitoring        Relevant Orders    Drug Screen, Urine With Reflex to Confirmation      Weakness of both arms        Relevant Orders    MR B plexus w and wo IV contrast        Rene Stacy MD  Neurology and Neuromuscular Medicine        The total time today was 80 minutes. This time included pre-charting, obtaining history, physical examination, counseling, independently interpreting results, and documentation.       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

## 2025-07-17 NOTE — PATIENT INSTRUCTIONS
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  This may be related to your cancer or inflammation related to this.  Other possibilities include nutritional deficiencies for which we will test for.  In addition I like you to get MRI scans of your spinal cord to further assess for abnormalities in that region.    For treatment we will start on Lyrica AKA pregabalin, and lidocaine 5% cream.  I will send a compounded pain cream through the mail.      Please do the following:     Call [phone] to schedule your MRI scan.     If you are having trouble scheduling your appointments or testing, please contact us.     Please get your blood drawn for causes of neuropathy.    I will be in touch with the treatment plan, after I discussed this with your oncologist.      Make a follow-up appointment with me in 3 months (ok for visit during inpatient weeks).      You can reach us at our office phone: [phone], option 2 or 3.     Rene Stacy MD  Neurology and Neuromuscular Medicine   [facility]   [facility]  Primary office: [facility], [address]  [address] [place] [address]

## 2025-07-18 ENCOUNTER — APPOINTMENT (OUTPATIENT)
Dept: HEMATOLOGY/ONCOLOGY | Facility: CLINIC | Age: 70
End: 2025-07-18
Payer: MEDICARE

## 2025-07-18 NOTE — PROGRESS NOTES
NEUROMUSCULAR ULTRASOUND OF THE BILATERAL UPPER EXTREMITIES AND NECK (BRACHIAL PLEXUS)    INDICATION:  Clinical Information: 5+ months of progressive pain and paresthesias throughout the body, associated with proximal and distal weakness, imbalance/ataxia.  Evaluate for hypertrophic neuropathy.    Neuromuscular ultrasound to be performed:    (a) to assess for nerve hypertrophy and possibly inflammatory neuropathy in this patient with a progressive predominantly lower motor neuron syndrome;  (b) to evaluate the echotexture and size of the right and left median nerves at mid-forearm and mid-arm;  (c) to evaluate the echotexture and size of right and left upper, middle and lower trunks of the brachial plexus.    HEIGHT: 5 ft 4 in  WEIGHT: 84 lbs.  HANDEDNESS: Right    COMPARISON:  None.    TECHNIQUE:  Neuromuscular ultrasound of the right and left forearms and arms was performed using a [employer] PXamarin ultrasound machine with a 15-6 MHz matrix linear transducer.  The right and left median nerves were examined at the middle of the forearm and middle of the arm, proper, both in transverse and longitudinal views. The patient was examined supine with the arm extended and supinated. Cross sectional area (CSA) was measured within the epineurium, using the trace method. At locations where repeat measurements were taken, the mean value is reported below. Next, the brachial plexus was examined on each side, identifying the three trunks of the brachial plexus as they passed between the anterior and middle scalene muscles.    FINDINGS:  The median nerve was normal at the right wrist crease with a CSA of 8.5 mm².    At the right mid-forearm, the median nerve CSA was 5.4 mm2 (NL < 11 mm2). The echogenicity of the median nerve in the forearm was normal.    At the right mid-arm, the median nerve CSA was 11.0 mm2 (NL < 14 mm2). The echogenicity of the median nerve in the arm was normal.    At the right brachial plexus, the three trunks were  seen between the anterior and middle scalene muscles. The CSA of the upper trunk (C6 root) the was 14.7 mm2 (NL < 9 mm2). The echogenicity of the upper trunk was normal. The CSA of the middle trunk was 15.5 mm2 (NL < 9 mm2). The echogenicity of the middle trunk was normal. The CSA of the lower trunk (C8 root) was 21.8 mm2 (NL < 9 mm2). The echogenicity of the lower trunk was normal.    The left median nerve at the wrist was normal with a CSA of 8.7 mm2.    Attention was then turned to the contralateral side. At the left mid-forearm, the median nerve CSA was 6.2 mm2 (NL < 11 mm2). The echogenicity of the median nerve in the forearm was normal.    At the left mid-arm, the median nerve CSA was 9.4 mm2 (NL < 14 mm2). The echogenicity of the median nerve in the arm was normal.    At the left brachial plexus, the three trunks were seen between the anterior and middle scalene muscles. The CSA of the upper trunk (C6 root) was 11.9 mm2 (NL < 9 mm2).  The C7 root measured a CSA of 18.2 mm².  The echogenicity of the upper trunk was normal. The CSA of the middle trunk was 10.2 mm2 (NL < 9 mm2). The echogenicity of the middle trunk was normal. The CSA of the lower trunk was 14.9 mm2 (NL < 9 mm2). The echogenicity of the lower trunk was normal.      IMPRESSION:  This is an abnormal neuromuscular ultrasound examination of the bilateral limited upper extremities and bilateral limited study of the neck (brachial plexus).     There was ultrasound evidence of nerve enlargement at non-entrapment sites. This occurred at bilateral brachial plexus.  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  However, given the patient's clinical history of lung cancer, and infiltrative process cannot be completely excluded, but is less likely.    In addition, there is no evidence of median neuropathy at the wrist bilaterally.      Performed by: Rene  MD Tawanna  Authorized by: Rene Stacy MD

## 2025-07-19 ENCOUNTER — HOSPITAL ENCOUNTER (INPATIENT)
Facility: HOSPITAL | Age: 70
DRG: 329 | End: 2025-07-19
Attending: STUDENT IN AN ORGANIZED HEALTH CARE EDUCATION/TRAINING PROGRAM | Admitting: SURGERY
Payer: MEDICARE

## 2025-07-19 DIAGNOSIS — K63.1 BOWEL PERFORATION (MULTI): Primary | ICD-10-CM

## 2025-07-19 LAB
ALBUMIN SERPL BCP-MCNC: 3.4 G/DL (ref 3.4–5)
ALP SERPL-CCNC: 116 U/L (ref 33–136)
ALT SERPL W P-5'-P-CCNC: 17 U/L (ref 7–45)
ANION GAP SERPL CALC-SCNC: 12 MMOL/L (ref 10–20)
AST SERPL W P-5'-P-CCNC: 21 U/L (ref 9–39)
BASOPHILS # BLD MANUAL: 0 X10*3/UL (ref 0–0.1)
BASOPHILS NFR BLD MANUAL: 0 %
BILIRUB SERPL-MCNC: 0.5 MG/DL (ref 0–1.2)
BUN SERPL-MCNC: 16 MG/DL (ref 6–23)
CALCIUM SERPL-MCNC: 9.6 MG/DL (ref 8.6–10.3)
CHLORIDE SERPL-SCNC: 101 MMOL/L (ref 98–107)
CO2 SERPL-SCNC: 27 MMOL/L (ref 21–32)
CREAT SERPL-MCNC: 0.66 MG/DL (ref 0.5–1.05)
EGFRCR SERPLBLD CKD-EPI 2021: >90 ML/MIN/1.73M*2
EOSINOPHIL # BLD MANUAL: 0.03 X10*3/UL (ref 0–0.7)
EOSINOPHIL NFR BLD MANUAL: 1 %
ERYTHROCYTE [DISTWIDTH] IN BLOOD BY AUTOMATED COUNT: 12.4 % (ref 11.5–14.5)
GLUCOSE SERPL-MCNC: 177 MG/DL (ref 74–99)
HCT VFR BLD AUTO: 31.3 % (ref 36–46)
HGB BLD-MCNC: 10.4 G/DL (ref 12–16)
IMM GRANULOCYTES # BLD AUTO: 0.02 X10*3/UL (ref 0–0.7)
IMM GRANULOCYTES NFR BLD AUTO: 0.7 % (ref 0–0.9)
LACTATE SERPL-SCNC: 2.6 MMOL/L (ref 0.4–2)
LACTATE SERPL-SCNC: 3 MMOL/L (ref 0.4–2)
LIPASE SERPL-CCNC: 4 U/L (ref 9–82)
LYMPHOCYTES # BLD MANUAL: 0.57 X10*3/UL (ref 1.2–4.8)
LYMPHOCYTES NFR BLD MANUAL: 21 %
MCH RBC QN AUTO: 33.8 PG (ref 26–34)
MCHC RBC AUTO-ENTMCNC: 33.2 G/DL (ref 32–36)
MCV RBC AUTO: 102 FL (ref 80–100)
METAMYELOCYTES # BLD MANUAL: 0.05 X10*3/UL
METAMYELOCYTES NFR BLD MANUAL: 2 %
MONOCYTES # BLD MANUAL: 0.35 X10*3/UL (ref 0.1–1)
MONOCYTES NFR BLD MANUAL: 13 %
NEUTROPHILS # BLD MANUAL: 1.71 X10*3/UL (ref 1.2–7.7)
NEUTS BAND # BLD MANUAL: 0.49 X10*3/UL (ref 0–0.7)
NEUTS BAND NFR BLD MANUAL: 18 %
NEUTS SEG # BLD MANUAL: 1.22 X10*3/UL (ref 1.2–7)
NEUTS SEG NFR BLD MANUAL: 45 %
NRBC BLD-RTO: 0 /100 WBCS (ref 0–0)
PLATELET # BLD AUTO: 146 X10*3/UL (ref 150–450)
POTASSIUM SERPL-SCNC: 4.1 MMOL/L (ref 3.5–5.3)
PROT SERPL-MCNC: 5.7 G/DL (ref 6.4–8.2)
RBC # BLD AUTO: 3.08 X10*6/UL (ref 4–5.2)
RBC MORPH BLD: ABNORMAL
SODIUM SERPL-SCNC: 136 MMOL/L (ref 136–145)
TOTAL CELLS COUNTED BLD: 100
TOXIC GRANULES BLD QL SMEAR: PRESENT
WBC # BLD AUTO: 2.7 X10*3/UL (ref 4.4–11.3)

## 2025-07-19 PROCEDURE — 2720000007 HC OR 272 NO HCPCS: Performed by: SURGERY

## 2025-07-19 PROCEDURE — 96375 TX/PRO/DX INJ NEW DRUG ADDON: CPT

## 2025-07-19 PROCEDURE — 85007 BL SMEAR W/DIFF WBC COUNT: CPT | Performed by: STUDENT IN AN ORGANIZED HEALTH CARE EDUCATION/TRAINING PROGRAM

## 2025-07-19 PROCEDURE — 3700000001 HC GENERAL ANESTHESIA TIME - INITIAL BASE CHARGE: Performed by: SURGERY

## 2025-07-19 PROCEDURE — 3700000002 HC GENERAL ANESTHESIA TIME - EACH INCREMENTAL 1 MINUTE: Performed by: SURGERY

## 2025-07-19 PROCEDURE — 2500000004 HC RX 250 GENERAL PHARMACY W/ HCPCS (ALT 636 FOR OP/ED): Performed by: STUDENT IN AN ORGANIZED HEALTH CARE EDUCATION/TRAINING PROGRAM

## 2025-07-19 PROCEDURE — 36415 COLL VENOUS BLD VENIPUNCTURE: CPT | Performed by: STUDENT IN AN ORGANIZED HEALTH CARE EDUCATION/TRAINING PROGRAM

## 2025-07-19 PROCEDURE — 74177 CT ABD & PELVIS W/CONTRAST: CPT | Performed by: STUDENT IN AN ORGANIZED HEALTH CARE EDUCATION/TRAINING PROGRAM

## 2025-07-19 PROCEDURE — 84075 ASSAY ALKALINE PHOSPHATASE: CPT | Performed by: STUDENT IN AN ORGANIZED HEALTH CARE EDUCATION/TRAINING PROGRAM

## 2025-07-19 PROCEDURE — 3600000008 HC OR TIME - EACH INCREMENTAL 1 MINUTE - PROCEDURE LEVEL THREE: Performed by: SURGERY

## 2025-07-19 PROCEDURE — 96365 THER/PROPH/DIAG IV INF INIT: CPT

## 2025-07-19 PROCEDURE — 2550000001 HC RX 255 CONTRASTS: Performed by: STUDENT IN AN ORGANIZED HEALTH CARE EDUCATION/TRAINING PROGRAM

## 2025-07-19 PROCEDURE — 83690 ASSAY OF LIPASE: CPT | Performed by: STUDENT IN AN ORGANIZED HEALTH CARE EDUCATION/TRAINING PROGRAM

## 2025-07-19 PROCEDURE — 87075 CULTR BACTERIA EXCEPT BLOOD: CPT | Mod: PORLAB | Performed by: STUDENT IN AN ORGANIZED HEALTH CARE EDUCATION/TRAINING PROGRAM

## 2025-07-19 PROCEDURE — 83605 ASSAY OF LACTIC ACID: CPT | Performed by: STUDENT IN AN ORGANIZED HEALTH CARE EDUCATION/TRAINING PROGRAM

## 2025-07-19 PROCEDURE — 85027 COMPLETE CBC AUTOMATED: CPT | Performed by: STUDENT IN AN ORGANIZED HEALTH CARE EDUCATION/TRAINING PROGRAM

## 2025-07-19 PROCEDURE — 3600000003 HC OR TIME - INITIAL BASE CHARGE - PROCEDURE LEVEL THREE: Performed by: SURGERY

## 2025-07-19 PROCEDURE — 99285 EMERGENCY DEPT VISIT HI MDM: CPT | Mod: 25 | Performed by: STUDENT IN AN ORGANIZED HEALTH CARE EDUCATION/TRAINING PROGRAM

## 2025-07-19 PROCEDURE — 96361 HYDRATE IV INFUSION ADD-ON: CPT

## 2025-07-19 RX ORDER — FENTANYL CITRATE 50 UG/ML
50 INJECTION, SOLUTION INTRAMUSCULAR; INTRAVENOUS ONCE
Status: COMPLETED | OUTPATIENT
Start: 2025-07-19 | End: 2025-07-19

## 2025-07-19 RX ORDER — MORPHINE SULFATE 4 MG/ML
4 INJECTION INTRAVENOUS ONCE
Status: COMPLETED | OUTPATIENT
Start: 2025-07-19 | End: 2025-07-19

## 2025-07-19 RX ORDER — MORPHINE SULFATE 4 MG/ML
4 INJECTION INTRAVENOUS ONCE
Status: DISCONTINUED | OUTPATIENT
Start: 2025-07-19 | End: 2025-07-19

## 2025-07-19 RX ADMIN — PIPERACILLIN SODIUM AND TAZOBACTAM SODIUM 3.38 G: 3; .375 INJECTION, SOLUTION INTRAVENOUS at 21:04

## 2025-07-19 RX ADMIN — MORPHINE SULFATE 4 MG: 4 INJECTION INTRAVENOUS at 18:32

## 2025-07-19 RX ADMIN — SODIUM CHLORIDE 500 ML: 0.9 INJECTION, SOLUTION INTRAVENOUS at 22:29

## 2025-07-19 RX ADMIN — SODIUM CHLORIDE 500 ML: 0.9 INJECTION, SOLUTION INTRAVENOUS at 18:45

## 2025-07-19 RX ADMIN — FENTANYL CITRATE 50 MCG: 50 INJECTION INTRAMUSCULAR; INTRAVENOUS at 22:29

## 2025-07-19 RX ADMIN — IOHEXOL 50 ML: 350 INJECTION, SOLUTION INTRAVENOUS at 19:16

## 2025-07-19 ASSESSMENT — PAIN SCALES - GENERAL
PAINLEVEL_OUTOF10: 7
PAINLEVEL_OUTOF10: 10 - WORST POSSIBLE PAIN
PAINLEVEL_OUTOF10: 10 - WORST POSSIBLE PAIN
PAINLEVEL_OUTOF10: 9

## 2025-07-19 ASSESSMENT — PAIN DESCRIPTION - PAIN TYPE: TYPE: CHRONIC PAIN

## 2025-07-19 ASSESSMENT — PAIN - FUNCTIONAL ASSESSMENT
PAIN_FUNCTIONAL_ASSESSMENT: 0-10

## 2025-07-19 ASSESSMENT — PAIN DESCRIPTION - LOCATION: LOCATION: ABDOMEN

## 2025-07-19 ASSESSMENT — LIFESTYLE VARIABLES
TOTAL SCORE: 0
EVER FELT BAD OR GUILTY ABOUT YOUR DRINKING: NO
HAVE PEOPLE ANNOYED YOU BY CRITICIZING YOUR DRINKING: NO
EVER HAD A DRINK FIRST THING IN THE MORNING TO STEADY YOUR NERVES TO GET RID OF A HANGOVER: NO
HAVE YOU EVER FELT YOU SHOULD CUT DOWN ON YOUR DRINKING: NO

## 2025-07-19 NOTE — ED PROVIDER NOTES
Emergency Department Provider Note       History of Present Illness     History provided by: Patient  Limitations to History: None  External Records Reviewed with Brief Summary: None    HPI:  Jaja Bradshaw is a 70 y.o. 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  Patient says she has not had a bowel movement for the last 4 days.  Now developing generalized abdominal pain.  No vomiting.  No dysuria or hematuria.  She had a prior PEG tube but that was taken out about a week ago.  No other abdominal surgeries.  No fevers or chills    Physical Exam   Triage vitals:  T    HR    BP    RR    O2        Physical Exam  Vitals and nursing note reviewed.   Constitutional:       General: She is not in acute distress.     Appearance: She is well-developed. She is not ill-appearing.   HENT:      Head: Atraumatic.     Eyes:      Extraocular Movements: Extraocular movements intact.       Cardiovascular:      Rate and Rhythm: Normal rate and regular rhythm.      Pulses: Normal pulses.      Heart sounds: No murmur heard.  Pulmonary:      Effort: Pulmonary effort is normal. No tachypnea, accessory muscle usage or respiratory distress.      Breath sounds: Normal breath sounds.   Abdominal:      Palpations: There is no hepatomegaly.      Tenderness: There is abdominal tenderness. There is no guarding or rebound.      Comments: Generalized abdominal tenderness     Musculoskeletal:      Right lower leg: No tenderness. No edema.      Left lower leg: No tenderness. No edema.     Skin:     General: Skin is warm and dry.      Capillary Refill: Capillary refill takes less than 2 seconds.     Neurological:      General: No focal deficit present.      Mental Status: She is alert.           Medical Decision Making & ED Course   Medical Decision Makin y.o. female presents ED with abdominal pain.  Vital signs stable.  I obtained CBC,  CMP, lipase, lactate, CT abdomen pelvis.  CBC shows a leukopenia of 2.7.  Lactate mildly elevated 2.6.  Otherwise labs appropriate.  CT abdomen pelvis shows signs of bowel perforation.  Patient started on Zosyn.  Spoke with Dr. Suresh of surgery who evaluated patient and will take the patient to the OR.  Plan for ICU afterwards.  ----      Differential diagnoses considered include but are not limited to: Bowel obstruction, enteritis, AAA, mesenteric ischemia, appendicitis, diverticulitis, UTI, gastritis, DKA          EKG Independent Interpretation: EKG not obtained    Independent Result Review and Interpretation: Relevant laboratory and radiographic results were reviewed and independently interpreted by myself.  As necessary, they are commented on in the ED Course.    Chronic conditions affecting the patient's care: As documented above in [facility]    The patient was discussed with the following consultants/services: Dr. Suresh    Care Considerations: As documented above in [facility]    ED Course:  ED Course as of 07/19/25 2330   Sat Jul 19, 2025 2[facility] paged [RS]      ED Course User Index  [RS] Paco Kelly DO         Diagnoses as of 07/19/25 2330   Bowel perforation (Multi)       Disposition   As a result of their workup, the patient will require admission to the hospital.  The patient was informed of her diagnosis.  The patient was given the opportunity to ask questions and I answered them. The patient agreed to be admitted to the hospital.    Procedures   Procedures        Paco Kelly DO  Emergency Medicine                                                       Paco Kelly DO  07/19/25 2331

## 2025-07-19 NOTE — ED TRIAGE NOTES
"Pt arrived to ED with complaint of constipation x4 days. Per patient she \"tried everything\" including suppositories and mag citrate. Patient sparks have lung cancer and has been receiving chemo. Last chemo treatment on Thursday, patient gets 3 rounds of chemo over three days (tueday/Wednesday/Thursday) and then does not get treatment for 3 weeks. Patient's neurologist also started her on lyrica on Friday.   "

## 2025-07-20 LAB
ABO GROUP (TYPE) IN BLOOD: NORMAL
ABO GROUP (TYPE) IN BLOOD: NORMAL
ALBUMIN SERPL BCP-MCNC: 1.7 G/DL (ref 3.4–5)
ALBUMIN SERPL BCP-MCNC: 2.4 G/DL (ref 3.4–5)
ALP SERPL-CCNC: 34 U/L (ref 33–136)
ALP SERPL-CCNC: 54 U/L (ref 33–136)
ALT SERPL W P-5'-P-CCNC: 1088 U/L (ref 7–45)
ALT SERPL W P-5'-P-CCNC: 8 U/L (ref 7–45)
ANION GAP BLDA CALCULATED.4IONS-SCNC: 16 MMO/L (ref 10–25)
ANION GAP BLDA CALCULATED.4IONS-SCNC: 17 MMO/L (ref 10–25)
ANION GAP BLDA CALCULATED.4IONS-SCNC: 22 MMO/L (ref 10–25)
ANION GAP BLDA CALCULATED.4IONS-SCNC: 22 MMO/L (ref 10–25)
ANION GAP BLDA CALCULATED.4IONS-SCNC: 7 MMO/L (ref 10–25)
ANION GAP SERPL CALC-SCNC: 11 MMOL/L (ref 10–20)
ANION GAP SERPL CALC-SCNC: 17 MMOL/L (ref 10–20)
ANTIBODY SCREEN: NORMAL
APTT PPP: 52 SECONDS (ref 26–36)
AST SERPL W P-5'-P-CCNC: 12 U/L (ref 9–39)
AST SERPL W P-5'-P-CCNC: 1493 U/L (ref 9–39)
BACTERIA BLD CULT: NORMAL
BACTERIA BLD CULT: NORMAL
BASE EXCESS BLDA CALC-SCNC: -12.3 MMOL/L (ref -2–3)
BASE EXCESS BLDA CALC-SCNC: -13.3 MMOL/L (ref -2–3)
BASE EXCESS BLDA CALC-SCNC: -14.1 MMOL/L (ref -2–3)
BASE EXCESS BLDA CALC-SCNC: -15.5 MMOL/L (ref -2–3)
BASE EXCESS BLDA CALC-SCNC: -15.7 MMOL/L (ref -2–3)
BASE EXCESS BLDA CALC-SCNC: -15.8 MMOL/L (ref -2–3)
BASE EXCESS BLDA CALC-SCNC: -15.9 MMOL/L (ref -2–3)
BASE EXCESS BLDA CALC-SCNC: -4.5 MMOL/L (ref -2–3)
BASOPHILS # BLD MANUAL: 0 X10*3/UL (ref 0–0.1)
BASOPHILS NFR BLD MANUAL: 0 %
BB ANTIBODY IDENTIFICATION: NORMAL
BILIRUB DIRECT SERPL-MCNC: 0.4 MG/DL (ref 0–0.3)
BILIRUB SERPL-MCNC: 0.3 MG/DL (ref 0–1.2)
BILIRUB SERPL-MCNC: 0.7 MG/DL (ref 0–1.2)
BLOOD EXPIRATION DATE: NORMAL
BODY TEMPERATURE: 37 DEGREES CELSIUS
BUN SERPL-MCNC: 18 MG/DL (ref 6–23)
BUN SERPL-MCNC: 19 MG/DL (ref 6–23)
BURR CELLS BLD QL SMEAR: ABNORMAL
CA-I BLDA-SCNC: 0.91 MMOL/L (ref 1.1–1.33)
CA-I BLDA-SCNC: 0.98 MMOL/L (ref 1.1–1.33)
CA-I BLDA-SCNC: 0.98 MMOL/L (ref 1.1–1.33)
CA-I BLDA-SCNC: 1.14 MMOL/L (ref 1.1–1.33)
CA-I BLDA-SCNC: 1.21 MMOL/L (ref 1.1–1.33)
CALCIUM SERPL-MCNC: 6.4 MG/DL (ref 8.6–10.3)
CALCIUM SERPL-MCNC: 7.4 MG/DL (ref 8.6–10.3)
CASE #: NORMAL
CHLORIDE BLDA-SCNC: 100 MMOL/L (ref 98–107)
CHLORIDE BLDA-SCNC: 104 MMOL/L (ref 98–107)
CHLORIDE BLDA-SCNC: 104 MMOL/L (ref 98–107)
CHLORIDE BLDA-SCNC: 109 MMOL/L (ref 98–107)
CHLORIDE BLDA-SCNC: 110 MMOL/L (ref 98–107)
CHLORIDE SERPL-SCNC: 103 MMOL/L (ref 98–107)
CHLORIDE SERPL-SCNC: 109 MMOL/L (ref 98–107)
CO2 SERPL-SCNC: 21 MMOL/L (ref 21–32)
CO2 SERPL-SCNC: 23 MMOL/L (ref 21–32)
CORTIS SERPL-MCNC: 81.5 UG/DL (ref 2.5–20)
CREAT SERPL-MCNC: 0.6 MG/DL (ref 0.5–1.05)
CREAT SERPL-MCNC: 0.85 MG/DL (ref 0.5–1.05)
DISPENSE STATUS: NORMAL
DOHLE BOD BLD QL SMEAR: PRESENT
EGFRCR SERPLBLD CKD-EPI 2021: 75 ML/MIN/1.73M*2
EGFRCR SERPLBLD CKD-EPI 2021: >90 ML/MIN/1.73M*2
EOSINOPHIL # BLD MANUAL: 0 X10*3/UL (ref 0–0.7)
EOSINOPHIL NFR BLD MANUAL: 0 %
ERYTHROCYTE [DISTWIDTH] IN BLOOD BY AUTOMATED COUNT: 12.7 % (ref 11.5–14.5)
ERYTHROCYTE [DISTWIDTH] IN BLOOD BY AUTOMATED COUNT: 12.7 % (ref 11.5–14.5)
ERYTHROCYTE [DISTWIDTH] IN BLOOD BY AUTOMATED COUNT: 14.9 % (ref 11.5–14.5)
ERYTHROCYTE [DISTWIDTH] IN BLOOD BY AUTOMATED COUNT: 14.9 % (ref 11.5–14.5)
ERYTHROCYTE [DISTWIDTH] IN BLOOD BY AUTOMATED COUNT: 16.9 % (ref 11.5–14.5)
FIBRINOGEN PPP-MCNC: 108 MG/DL (ref 200–400)
FIBRINOGEN PPP-MCNC: 190 MG/DL (ref 200–400)
GLUCOSE BLDA-MCNC: 138 MG/DL (ref 74–99)
GLUCOSE BLDA-MCNC: 157 MG/DL (ref 74–99)
GLUCOSE BLDA-MCNC: 171 MG/DL (ref 74–99)
GLUCOSE BLDA-MCNC: 206 MG/DL (ref 74–99)
GLUCOSE BLDA-MCNC: 241 MG/DL (ref 74–99)
GLUCOSE SERPL-MCNC: 176 MG/DL (ref 74–99)
GLUCOSE SERPL-MCNC: 201 MG/DL (ref 74–99)
GRAM STN SPEC: ABNORMAL
GRAM STN SPEC: ABNORMAL
HAPTOGLOB SERPL NEPH-MCNC: 114 MG/DL (ref 30–200)
HCO3 BLDA-SCNC: 12.4 MMOL/L (ref 22–26)
HCO3 BLDA-SCNC: 12.5 MMOL/L (ref 22–26)
HCO3 BLDA-SCNC: 13.3 MMOL/L (ref 22–26)
HCO3 BLDA-SCNC: 13.4 MMOL/L (ref 22–26)
HCO3 BLDA-SCNC: 14.4 MMOL/L (ref 22–26)
HCO3 BLDA-SCNC: 15.3 MMOL/L (ref 22–26)
HCO3 BLDA-SCNC: 16.5 MMOL/L (ref 22–26)
HCO3 BLDA-SCNC: 22 MMOL/L (ref 22–26)
HCT VFR BLD AUTO: 14.1 % (ref 36–46)
HCT VFR BLD AUTO: 14.5 % (ref 36–46)
HCT VFR BLD AUTO: 15.6 % (ref 36–46)
HCT VFR BLD AUTO: 21.2 % (ref 36–46)
HCT VFR BLD AUTO: 26.1 % (ref 36–46)
HCT VFR BLD EST: 13 % (ref 36–46)
HCT VFR BLD EST: 15 % (ref 36–46)
HCT VFR BLD EST: 16 % (ref 36–46)
HCT VFR BLD EST: 16 % (ref 36–46)
HCT VFR BLD EST: 26 % (ref 36–46)
HGB BLD-MCNC: 4.3 G/DL (ref 12–16)
HGB BLD-MCNC: 4.8 G/DL (ref 12–16)
HGB BLD-MCNC: 5.1 G/DL (ref 12–16)
HGB BLD-MCNC: 6.6 G/DL (ref 12–16)
HGB BLD-MCNC: 8.4 G/DL (ref 12–16)
HGB BLDA-MCNC: 4.2 G/DL (ref 12–16)
HGB BLDA-MCNC: 5 G/DL (ref 12–16)
HGB BLDA-MCNC: 5.2 G/DL (ref 12–16)
HGB BLDA-MCNC: 5.4 G/DL (ref 12–16)
HGB BLDA-MCNC: 8.8 G/DL (ref 12–16)
IMM GRANULOCYTES # BLD AUTO: 0.29 X10*3/UL (ref 0–0.7)
IMM GRANULOCYTES NFR BLD AUTO: 3.7 % (ref 0–0.9)
INHALED O2 CONCENTRATION: 21 %
INHALED O2 CONCENTRATION: 44 %
INHALED O2 CONCENTRATION: 50 %
INHALED O2 CONCENTRATION: 70 %
INR PPP: 1.9 (ref 0.9–1.1)
INR PPP: 2.7 (ref 0.9–1.1)
INR PPP: 3 (ref 0.9–1.1)
LACTATE BLDA-SCNC: 13.2 MMOL/L (ref 0.4–2)
LACTATE BLDA-SCNC: 13.7 MMOL/L (ref 0.4–2)
LACTATE BLDA-SCNC: 14.8 MMOL/L (ref 0.4–2)
LACTATE BLDA-SCNC: 15 MMOL/L (ref 0.4–2)
LACTATE BLDA-SCNC: 4.7 MMOL/L (ref 0.4–2)
LACTATE BLDV-SCNC: 13 MMOL/L (ref 0.4–2)
LACTATE SERPL-SCNC: 10.6 MMOL/L (ref 0.4–2)
LACTATE SERPL-SCNC: 13.2 MMOL/L (ref 0.4–2)
LACTATE SERPL-SCNC: 14 MMOL/L (ref 0.4–2)
LYMPHOCYTES # BLD MANUAL: 1.01 X10*3/UL (ref 1.2–4.8)
LYMPHOCYTES NFR BLD MANUAL: 13 %
MAGNESIUM SERPL-MCNC: 2.34 MG/DL (ref 1.6–2.4)
MAGNESIUM SERPL-MCNC: 2.82 MG/DL (ref 1.6–2.4)
MCH RBC QN AUTO: 29.9 PG (ref 26–34)
MCH RBC QN AUTO: 30.4 PG (ref 26–34)
MCH RBC QN AUTO: 30.5 PG (ref 26–34)
MCH RBC QN AUTO: 34.1 PG (ref 26–34)
MCH RBC QN AUTO: 34.3 PG (ref 26–34)
MCHC RBC AUTO-ENTMCNC: 30.5 G/DL (ref 32–36)
MCHC RBC AUTO-ENTMCNC: 31.1 G/DL (ref 32–36)
MCHC RBC AUTO-ENTMCNC: 32.2 G/DL (ref 32–36)
MCHC RBC AUTO-ENTMCNC: 32.7 G/DL (ref 32–36)
MCHC RBC AUTO-ENTMCNC: 33.1 G/DL (ref 32–36)
MCV RBC AUTO: 104 FL (ref 80–100)
MCV RBC AUTO: 112 FL (ref 80–100)
MCV RBC AUTO: 93 FL (ref 80–100)
MCV RBC AUTO: 95 FL (ref 80–100)
MCV RBC AUTO: 96 FL (ref 80–100)
METAMYELOCYTES # BLD MANUAL: 0.31 X10*3/UL
METAMYELOCYTES NFR BLD MANUAL: 4 %
MONOCYTES # BLD MANUAL: 0.7 X10*3/UL (ref 0.1–1)
MONOCYTES NFR BLD MANUAL: 9 %
NEUTROPHILS # BLD MANUAL: 5.7 X10*3/UL (ref 1.2–7.7)
NEUTS BAND # BLD MANUAL: 1.64 X10*3/UL (ref 0–0.7)
NEUTS BAND NFR BLD MANUAL: 21 %
NEUTS SEG # BLD MANUAL: 4.06 X10*3/UL (ref 1.2–7)
NEUTS SEG NFR BLD MANUAL: 52 %
NEUTS VAC BLD QL SMEAR: PRESENT
NRBC BLD-RTO: 0 /100 WBCS (ref 0–0)
NRBC BLD-RTO: 0 /100 WBCS (ref 0–0)
NRBC BLD-RTO: 0.2 /100 WBCS (ref 0–0)
NRBC BLD-RTO: 0.3 /100 WBCS (ref 0–0)
NRBC BLD-RTO: 0.4 /100 WBCS (ref 0–0)
OXYHGB MFR BLDA: 82.6 % (ref 94–98)
OXYHGB MFR BLDA: 91 % (ref 94–98)
OXYHGB MFR BLDA: 97.1 % (ref 94–98)
OXYHGB MFR BLDA: 97.3 % (ref 94–98)
OXYHGB MFR BLDA: 97.6 % (ref 94–98)
OXYHGB MFR BLDA: 98 % (ref 94–98)
OXYHGB MFR BLDA: 98.1 % (ref 94–98)
OXYHGB MFR BLDA: 98.9 % (ref 94–98)
PCO2 BLDA: 36 MM HG (ref 38–42)
PCO2 BLDA: 36 MM HG (ref 38–42)
PCO2 BLDA: 38 MM HG (ref 38–42)
PCO2 BLDA: 42 MM HG (ref 38–42)
PCO2 BLDA: 49 MM HG (ref 38–42)
PCO2 BLDA: 53 MM HG (ref 38–42)
PCO2 BLDA: 53 MM HG (ref 38–42)
PCO2 BLDA: 58 MM HG (ref 38–42)
PH BLDA: 7.01 PH (ref 7.38–7.42)
PH BLDA: 7.03 PH (ref 7.38–7.42)
PH BLDA: 7.1 PH (ref 7.38–7.42)
PH BLDA: 7.11 PH (ref 7.38–7.42)
PH BLDA: 7.12 PH (ref 7.38–7.42)
PH BLDA: 7.15 PH (ref 7.38–7.42)
PH BLDA: 7.21 PH (ref 7.38–7.42)
PH BLDA: 7.26 PH (ref 7.38–7.42)
PHOSPHATE SERPL-MCNC: 4.8 MG/DL (ref 2.5–4.9)
PHOSPHATE SERPL-MCNC: 8.5 MG/DL (ref 2.5–4.9)
PLATELET # BLD AUTO: 33 X10*3/UL (ref 150–450)
PLATELET # BLD AUTO: 45 X10*3/UL (ref 150–450)
PLATELET # BLD AUTO: 64 X10*3/UL (ref 150–450)
PLATELET # BLD AUTO: 79 X10*3/UL (ref 150–450)
PLATELET # BLD AUTO: 84 X10*3/UL (ref 150–450)
PO2 BLDA: 175 MM HG (ref 85–95)
PO2 BLDA: 178 MM HG (ref 85–95)
PO2 BLDA: 183 MM HG (ref 85–95)
PO2 BLDA: 188 MM HG (ref 85–95)
PO2 BLDA: 191 MM HG (ref 85–95)
PO2 BLDA: 272 MM HG (ref 85–95)
PO2 BLDA: 50 MM HG (ref 85–95)
PO2 BLDA: 62 MM HG (ref 85–95)
POTASSIUM BLDA-SCNC: 4 MMOL/L (ref 3.5–5.3)
POTASSIUM BLDA-SCNC: 4.2 MMOL/L (ref 3.5–5.3)
POTASSIUM BLDA-SCNC: 4.6 MMOL/L (ref 3.5–5.3)
POTASSIUM BLDA-SCNC: 4.8 MMOL/L (ref 3.5–5.3)
POTASSIUM BLDA-SCNC: 4.8 MMOL/L (ref 3.5–5.3)
POTASSIUM SERPL-SCNC: 3.9 MMOL/L (ref 3.5–5.3)
POTASSIUM SERPL-SCNC: 4.2 MMOL/L (ref 3.5–5.3)
PRODUCT BLOOD TYPE: 5100
PRODUCT BLOOD TYPE: 9500
PRODUCT BLOOD TYPE: 9500
PRODUCT CODE: NORMAL
PROT SERPL-MCNC: 3.3 G/DL (ref 6.4–8.2)
PROT SERPL-MCNC: <3 G/DL (ref 6.4–8.2)
PROTHROMBIN TIME: 20.8 SECONDS (ref 9.8–12.4)
PROTHROMBIN TIME: 29.5 SECONDS (ref 9.8–12.4)
PROTHROMBIN TIME: 33.4 SECONDS (ref 9.8–12.4)
RBC # BLD AUTO: 1.26 X10*6/UL (ref 4–5.2)
RBC # BLD AUTO: 1.4 X10*6/UL (ref 4–5.2)
RBC # BLD AUTO: 1.67 X10*6/UL (ref 4–5.2)
RBC # BLD AUTO: 2.21 X10*6/UL (ref 4–5.2)
RBC # BLD AUTO: 2.76 X10*6/UL (ref 4–5.2)
RBC MORPH BLD: ABNORMAL
RH FACTOR (ANTIGEN D): NORMAL
RH FACTOR (ANTIGEN D): NORMAL
SAO2 % BLDA: 83 % (ref 94–100)
SAO2 % BLDA: 93 % (ref 94–100)
SAO2 % BLDA: 98 % (ref 94–100)
SAO2 % BLDA: 98 % (ref 94–100)
SAO2 % BLDA: 99 % (ref 94–100)
SODIUM BLDA-SCNC: 130 MMOL/L (ref 136–145)
SODIUM BLDA-SCNC: 131 MMOL/L (ref 136–145)
SODIUM BLDA-SCNC: 134 MMOL/L (ref 136–145)
SODIUM BLDA-SCNC: 135 MMOL/L (ref 136–145)
SODIUM BLDA-SCNC: 136 MMOL/L (ref 136–145)
SODIUM SERPL-SCNC: 137 MMOL/L (ref 136–145)
SODIUM SERPL-SCNC: 139 MMOL/L (ref 136–145)
TOTAL CELLS COUNTED BLD: 100
TOXIC GRANULES BLD QL SMEAR: PRESENT
UNIT ABO: NORMAL
UNIT NUMBER: NORMAL
UNIT RH: NORMAL
UNIT VOLUME: 100
UNIT VOLUME: 217
UNIT VOLUME: 222
UNIT VOLUME: 222
UNIT VOLUME: 256
UNIT VOLUME: 280
UNIT VOLUME: 281
UNIT VOLUME: 283
UNIT VOLUME: 284
UNIT VOLUME: 286
UNIT VOLUME: 297
UNIT VOLUME: 350
UNIT VOLUME: 80
VARIANT LYMPHS # BLD MANUAL: 0.08 X10*3/UL (ref 0–0.5)
VARIANT LYMPHS NFR BLD: 1 %
WBC # BLD AUTO: 11.7 X10*3/UL (ref 4.4–11.3)
WBC # BLD AUTO: 5 X10*3/UL (ref 4.4–11.3)
WBC # BLD AUTO: 6.8 X10*3/UL (ref 4.4–11.3)
WBC # BLD AUTO: 7.2 X10*3/UL (ref 4.4–11.3)
WBC # BLD AUTO: 7.8 X10*3/UL (ref 4.4–11.3)
XM INTEP: NORMAL

## 2025-07-20 PROCEDURE — 82805 BLOOD GASES W/O2 SATURATION: CPT | Performed by: INTERNAL MEDICINE

## 2025-07-20 PROCEDURE — 87070 CULTURE OTHR SPECIMN AEROBIC: CPT | Mod: PORLAB | Performed by: SURGERY

## 2025-07-20 PROCEDURE — 37799 UNLISTED PX VASCULAR SURGERY: CPT | Performed by: SURGERY

## 2025-07-20 PROCEDURE — 2500000004 HC RX 250 GENERAL PHARMACY W/ HCPCS (ALT 636 FOR OP/ED)

## 2025-07-20 PROCEDURE — 82533 TOTAL CORTISOL: CPT | Mod: PORLAB

## 2025-07-20 PROCEDURE — 99291 CRITICAL CARE FIRST HOUR: CPT | Performed by: INTERNAL MEDICINE

## 2025-07-20 PROCEDURE — 86922 COMPATIBILITY TEST ANTIGLOB: CPT

## 2025-07-20 PROCEDURE — 5A1935Z RESPIRATORY VENTILATION, LESS THAN 24 CONSECUTIVE HOURS: ICD-10-PCS | Performed by: INTERNAL MEDICINE

## 2025-07-20 PROCEDURE — 99291 CRITICAL CARE FIRST HOUR: CPT

## 2025-07-20 PROCEDURE — 85027 COMPLETE CBC AUTOMATED: CPT | Performed by: INTERNAL MEDICINE

## 2025-07-20 PROCEDURE — 36556 INSERT NON-TUNNEL CV CATH: CPT

## 2025-07-20 PROCEDURE — 2500000004 HC RX 250 GENERAL PHARMACY W/ HCPCS (ALT 636 FOR OP/ED): Performed by: STUDENT IN AN ORGANIZED HEALTH CARE EDUCATION/TRAINING PROGRAM

## 2025-07-20 PROCEDURE — 0BH17EZ INSERTION OF ENDOTRACHEAL AIRWAY INTO TRACHEA, VIA NATURAL OR ARTIFICIAL OPENING: ICD-10-PCS | Performed by: INTERNAL MEDICINE

## 2025-07-20 PROCEDURE — 84100 ASSAY OF PHOSPHORUS: CPT

## 2025-07-20 PROCEDURE — 36430 TRANSFUSION BLD/BLD COMPNT: CPT

## 2025-07-20 PROCEDURE — 85007 BL SMEAR W/DIFF WBC COUNT: CPT | Performed by: INTERNAL MEDICINE

## 2025-07-20 PROCEDURE — 0D1N0Z4 BYPASS SIGMOID COLON TO CUTANEOUS, OPEN APPROACH: ICD-10-PCS | Performed by: SURGERY

## 2025-07-20 PROCEDURE — 2500000005 HC RX 250 GENERAL PHARMACY W/O HCPCS

## 2025-07-20 PROCEDURE — 83605 ASSAY OF LACTIC ACID: CPT | Performed by: INTERNAL MEDICINE

## 2025-07-20 PROCEDURE — 83735 ASSAY OF MAGNESIUM: CPT | Performed by: SURGERY

## 2025-07-20 PROCEDURE — 2500000005 HC RX 250 GENERAL PHARMACY W/O HCPCS: Performed by: SURGERY

## 2025-07-20 PROCEDURE — 2500000005 HC RX 250 GENERAL PHARMACY W/O HCPCS: Performed by: ANESTHESIOLOGY

## 2025-07-20 PROCEDURE — 99233 SBSQ HOSP IP/OBS HIGH 50: CPT | Performed by: INTERNAL MEDICINE

## 2025-07-20 PROCEDURE — 31500 INSERT EMERGENCY AIRWAY: CPT | Performed by: INTERNAL MEDICINE

## 2025-07-20 PROCEDURE — P9016 RBC LEUKOCYTES REDUCED: HCPCS

## 2025-07-20 PROCEDURE — 88304 TISSUE EXAM BY PATHOLOGIST: CPT | Mod: TC,PORLAB | Performed by: SURGERY

## 2025-07-20 PROCEDURE — 84132 ASSAY OF SERUM POTASSIUM: CPT | Performed by: INTERNAL MEDICINE

## 2025-07-20 PROCEDURE — 0DTJ0ZZ RESECTION OF APPENDIX, OPEN APPROACH: ICD-10-PCS | Performed by: SURGERY

## 2025-07-20 PROCEDURE — 2020000001 HC ICU ROOM DAILY

## 2025-07-20 PROCEDURE — 85384 FIBRINOGEN ACTIVITY: CPT | Performed by: INTERNAL MEDICINE

## 2025-07-20 PROCEDURE — 86902 BLOOD TYPE ANTIGEN DONOR EA: CPT

## 2025-07-20 PROCEDURE — 85610 PROTHROMBIN TIME: CPT | Performed by: INTERNAL MEDICINE

## 2025-07-20 PROCEDURE — 85730 THROMBOPLASTIN TIME PARTIAL: CPT

## 2025-07-20 PROCEDURE — 84132 ASSAY OF SERUM POTASSIUM: CPT | Performed by: SURGERY

## 2025-07-20 PROCEDURE — 99292 CRITICAL CARE ADDL 30 MIN: CPT

## 2025-07-20 PROCEDURE — 84132 ASSAY OF SERUM POTASSIUM: CPT

## 2025-07-20 PROCEDURE — 71045 X-RAY EXAM CHEST 1 VIEW: CPT | Performed by: RADIOLOGY

## 2025-07-20 PROCEDURE — 85027 COMPLETE CBC AUTOMATED: CPT

## 2025-07-20 PROCEDURE — 2500000005 HC RX 250 GENERAL PHARMACY W/O HCPCS: Performed by: INTERNAL MEDICINE

## 2025-07-20 PROCEDURE — 83010 ASSAY OF HAPTOGLOBIN QUANT: CPT | Mod: PORLAB

## 2025-07-20 PROCEDURE — P9017 PLASMA 1 DONOR FRZ W/IN 8 HR: HCPCS

## 2025-07-20 PROCEDURE — 94002 VENT MGMT INPAT INIT DAY: CPT

## 2025-07-20 PROCEDURE — 02HV33Z INSERTION OF INFUSION DEVICE INTO SUPERIOR VENA CAVA, PERCUTANEOUS APPROACH: ICD-10-PCS | Performed by: INTERNAL MEDICINE

## 2025-07-20 PROCEDURE — 37799 UNLISTED PX VASCULAR SURGERY: CPT

## 2025-07-20 PROCEDURE — 85384 FIBRINOGEN ACTIVITY: CPT

## 2025-07-20 PROCEDURE — 31500 INSERT EMERGENCY AIRWAY: CPT

## 2025-07-20 PROCEDURE — P9047 ALBUMIN (HUMAN), 25%, 50ML: HCPCS

## 2025-07-20 PROCEDURE — 36415 COLL VENOUS BLD VENIPUNCTURE: CPT

## 2025-07-20 PROCEDURE — 99223 1ST HOSP IP/OBS HIGH 75: CPT | Performed by: INTERNAL MEDICINE

## 2025-07-20 PROCEDURE — 99292 CRITICAL CARE ADDL 30 MIN: CPT | Performed by: INTERNAL MEDICINE

## 2025-07-20 PROCEDURE — 85027 COMPLETE CBC AUTOMATED: CPT | Performed by: SURGERY

## 2025-07-20 PROCEDURE — 83605 ASSAY OF LACTIC ACID: CPT

## 2025-07-20 PROCEDURE — 2500000004 HC RX 250 GENERAL PHARMACY W/ HCPCS (ALT 636 FOR OP/ED): Performed by: SURGERY

## 2025-07-20 PROCEDURE — 2500000001 HC RX 250 WO HCPCS SELF ADMINISTERED DRUGS (ALT 637 FOR MEDICARE OP): Performed by: INTERNAL MEDICINE

## 2025-07-20 PROCEDURE — 84100 ASSAY OF PHOSPHORUS: CPT | Performed by: INTERNAL MEDICINE

## 2025-07-20 PROCEDURE — 86870 RBC ANTIBODY IDENTIFICATION: CPT

## 2025-07-20 PROCEDURE — 82330 ASSAY OF CALCIUM: CPT | Performed by: SURGERY

## 2025-07-20 PROCEDURE — P9073 PLATELETS PHERESIS PATH REDU: HCPCS

## 2025-07-20 PROCEDURE — 0DBN0ZZ EXCISION OF SIGMOID COLON, OPEN APPROACH: ICD-10-PCS | Performed by: SURGERY

## 2025-07-20 PROCEDURE — 2500000004 HC RX 250 GENERAL PHARMACY W/ HCPCS (ALT 636 FOR OP/ED): Performed by: INTERNAL MEDICINE

## 2025-07-20 PROCEDURE — 83735 ASSAY OF MAGNESIUM: CPT | Performed by: INTERNAL MEDICINE

## 2025-07-20 PROCEDURE — 86850 RBC ANTIBODY SCREEN: CPT

## 2025-07-20 PROCEDURE — 82248 BILIRUBIN DIRECT: CPT | Performed by: INTERNAL MEDICINE

## 2025-07-20 RX ORDER — SODIUM CHLORIDE, SODIUM LACTATE, POTASSIUM CHLORIDE, CALCIUM CHLORIDE 600; 310; 30; 20 MG/100ML; MG/100ML; MG/100ML; MG/100ML
125 INJECTION, SOLUTION INTRAVENOUS CONTINUOUS
Status: DISCONTINUED | OUTPATIENT
Start: 2025-07-20 | End: 2025-07-20

## 2025-07-20 RX ORDER — SODIUM CHLORIDE 0.9 G/100ML
INJECTION, SOLUTION IRRIGATION AS NEEDED
Status: DISCONTINUED | OUTPATIENT
Start: 2025-07-20 | End: 2025-07-20 | Stop reason: HOSPADM

## 2025-07-20 RX ORDER — MICAFUNGIN IN SODIUM CHLORIDE 100 MG/100ML
100 INJECTION INTRAVENOUS EVERY 24 HOURS
Status: DISCONTINUED | OUTPATIENT
Start: 2025-07-20 | End: 2025-07-20

## 2025-07-20 RX ORDER — MORPHINE SULFATE 2 MG/ML
2 INJECTION, SOLUTION INTRAMUSCULAR; INTRAVENOUS EVERY 5 MIN PRN
Status: DISCONTINUED | OUTPATIENT
Start: 2025-07-20 | End: 2025-07-20

## 2025-07-20 RX ORDER — HYDROMORPHONE HYDROCHLORIDE 0.2 MG/ML
0.2 INJECTION INTRAMUSCULAR; INTRAVENOUS; SUBCUTANEOUS
Status: DISCONTINUED | OUTPATIENT
Start: 2025-07-20 | End: 2025-07-20

## 2025-07-20 RX ORDER — MIDAZOLAM HYDROCHLORIDE 1 MG/ML
1 INJECTION, SOLUTION INTRAMUSCULAR; INTRAVENOUS EVERY 2 HOUR PRN
Status: DISCONTINUED | OUTPATIENT
Start: 2025-07-20 | End: 2025-07-21 | Stop reason: HOSPADM

## 2025-07-20 RX ORDER — MIDAZOLAM HYDROCHLORIDE 1 MG/ML
INJECTION, SOLUTION INTRAMUSCULAR; INTRAVENOUS
Status: COMPLETED
Start: 2025-07-20 | End: 2025-07-20

## 2025-07-20 RX ORDER — FENTANYL CITRATE 50 UG/ML
50 INJECTION, SOLUTION INTRAMUSCULAR; INTRAVENOUS ONCE
Status: COMPLETED | OUTPATIENT
Start: 2025-07-20 | End: 2025-07-20

## 2025-07-20 RX ORDER — FENTANYL CITRATE-0.9 % NACL/PF 10 MCG/ML
0-150 PLASTIC BAG, INJECTION (ML) INTRAVENOUS CONTINUOUS
Status: DISCONTINUED | OUTPATIENT
Start: 2025-07-20 | End: 2025-07-20

## 2025-07-20 RX ORDER — CALCIUM GLUCONATE 20 MG/ML
2 INJECTION, SOLUTION INTRAVENOUS ONCE
Status: COMPLETED | OUTPATIENT
Start: 2025-07-20 | End: 2025-07-20

## 2025-07-20 RX ORDER — LIDOCAINE HYDROCHLORIDE 10 MG/ML
0.1 INJECTION, SOLUTION INFILTRATION; PERINEURAL ONCE
Status: DISCONTINUED | OUTPATIENT
Start: 2025-07-20 | End: 2025-07-20

## 2025-07-20 RX ORDER — ETOMIDATE 2 MG/ML
INJECTION INTRAVENOUS
Status: COMPLETED
Start: 2025-07-20 | End: 2025-07-20

## 2025-07-20 RX ORDER — SODIUM CHLORIDE, SODIUM LACTATE, POTASSIUM CHLORIDE, CALCIUM CHLORIDE 600; 310; 30; 20 MG/100ML; MG/100ML; MG/100ML; MG/100ML
100 INJECTION, SOLUTION INTRAVENOUS CONTINUOUS
Status: DISCONTINUED | OUTPATIENT
Start: 2025-07-20 | End: 2025-07-20

## 2025-07-20 RX ORDER — MIDAZOLAM HYDROCHLORIDE 1 MG/ML
2 INJECTION, SOLUTION INTRAMUSCULAR; INTRAVENOUS ONCE
Status: COMPLETED | OUTPATIENT
Start: 2025-07-20 | End: 2025-07-20

## 2025-07-20 RX ORDER — ETOMIDATE 2 MG/ML
20 INJECTION INTRAVENOUS ONCE
Status: COMPLETED | OUTPATIENT
Start: 2025-07-20 | End: 2025-07-20

## 2025-07-20 RX ORDER — SENNOSIDES 8.8 MG/5ML
10 LIQUID ORAL 2 TIMES DAILY
Status: DISCONTINUED | OUTPATIENT
Start: 2025-07-20 | End: 2025-07-20

## 2025-07-20 RX ORDER — BACITRACIN ZINC 500 UNIT/G
OINTMENT IN PACKET (EA) TOPICAL AS NEEDED
Status: DISCONTINUED | OUTPATIENT
Start: 2025-07-20 | End: 2025-07-20 | Stop reason: HOSPADM

## 2025-07-20 RX ORDER — SODIUM BICARBONATE 1 MEQ/ML
SYRINGE (ML) INTRAVENOUS
Status: COMPLETED
Start: 2025-07-20 | End: 2025-07-20

## 2025-07-20 RX ORDER — ESOMEPRAZOLE MAGNESIUM 40 MG/1
20 GRANULE, DELAYED RELEASE ORAL DAILY
Status: DISCONTINUED | OUTPATIENT
Start: 2025-07-20 | End: 2025-07-20

## 2025-07-20 RX ORDER — GLYCOPYRROLATE 0.2 MG/ML
0.2 INJECTION INTRAMUSCULAR; INTRAVENOUS EVERY 4 HOURS PRN
Status: DISCONTINUED | OUTPATIENT
Start: 2025-07-20 | End: 2025-07-21 | Stop reason: HOSPADM

## 2025-07-20 RX ORDER — PANTOPRAZOLE SODIUM 40 MG/10ML
40 INJECTION, POWDER, LYOPHILIZED, FOR SOLUTION INTRAVENOUS 2 TIMES DAILY
Status: DISCONTINUED | OUTPATIENT
Start: 2025-07-20 | End: 2025-07-20

## 2025-07-20 RX ORDER — HYDROCODONE BITARTRATE AND ACETAMINOPHEN 5; 325 MG/1; MG/1
1 TABLET ORAL EVERY 6 HOURS PRN
Status: DISCONTINUED | OUTPATIENT
Start: 2025-07-20 | End: 2025-07-20

## 2025-07-20 RX ORDER — ALBUMIN HUMAN 250 G/1000ML
50 SOLUTION INTRAVENOUS ONCE
Status: COMPLETED | OUTPATIENT
Start: 2025-07-20 | End: 2025-07-20

## 2025-07-20 RX ORDER — VASOPRESSIN 20 UNIT/100 ML (0.2 UNIT/ML) IN 0.9 % SODIUM CHLORIDE IV
SOLUTION
Status: COMPLETED
Start: 2025-07-20 | End: 2025-07-20

## 2025-07-20 RX ORDER — PREGABALIN 75 MG/1
75 CAPSULE ORAL 2 TIMES DAILY
Status: DISCONTINUED | OUTPATIENT
Start: 2025-07-20 | End: 2025-07-20

## 2025-07-20 RX ORDER — NOREPINEPHRINE BITARTRATE/D5W 8 MG/250ML
.01-1 PLASTIC BAG, INJECTION (ML) INTRAVENOUS CONTINUOUS
Status: DISCONTINUED | OUTPATIENT
Start: 2025-07-20 | End: 2025-07-20

## 2025-07-20 RX ORDER — SODIUM BICARBONATE 1 MEQ/ML
50 SYRINGE (ML) INTRAVENOUS ONCE
Status: COMPLETED | OUTPATIENT
Start: 2025-07-20 | End: 2025-07-20

## 2025-07-20 RX ORDER — KETOROLAC TROMETHAMINE 30 MG/ML
15 INJECTION, SOLUTION INTRAMUSCULAR; INTRAVENOUS EVERY 6 HOURS PRN
Status: DISCONTINUED | OUTPATIENT
Start: 2025-07-20 | End: 2025-07-20

## 2025-07-20 RX ORDER — LEVOTHYROXINE SODIUM 88 UG/1
88 TABLET ORAL DAILY
Status: DISCONTINUED | OUTPATIENT
Start: 2025-07-21 | End: 2025-07-20

## 2025-07-20 RX ORDER — VASOPRESSIN 20 UNIT/100 ML (0.2 UNIT/ML) IN 0.9 % SODIUM CHLORIDE IV
0.03 SOLUTION CONTINUOUS
Status: DISCONTINUED | OUTPATIENT
Start: 2025-07-20 | End: 2025-07-20

## 2025-07-20 RX ORDER — FENTANYL CITRATE 50 UG/ML
INJECTION, SOLUTION INTRAMUSCULAR; INTRAVENOUS
Status: COMPLETED
Start: 2025-07-20 | End: 2025-07-20

## 2025-07-20 RX ORDER — LEVOTHYROXINE SODIUM 88 UG/1
88 TABLET ORAL DAILY
Status: DISCONTINUED | OUTPATIENT
Start: 2025-07-20 | End: 2025-07-20

## 2025-07-20 RX ADMIN — PANTOPRAZOLE SODIUM 40 MG: 40 INJECTION, POWDER, FOR SOLUTION INTRAVENOUS at 20:10

## 2025-07-20 RX ADMIN — ETOMIDATE INJECTION 20 MG: 2 SOLUTION INTRAVENOUS at 13:22

## 2025-07-20 RX ADMIN — Medication 50 PERCENT: at 19:19

## 2025-07-20 RX ADMIN — NOREPINEPHRINE BITARTRATE 0.65 MCG/KG/MIN: 8 INJECTION, SOLUTION INTRAVENOUS at 08:30

## 2025-07-20 RX ADMIN — FENTANYL CITRATE 50 MCG: 50 INJECTION, SOLUTION INTRAMUSCULAR; INTRAVENOUS at 13:24

## 2025-07-20 RX ADMIN — Medication 50 PERCENT: at 20:30

## 2025-07-20 RX ADMIN — Medication 50 MEQ: at 08:24

## 2025-07-20 RX ADMIN — SODIUM CHLORIDE, SODIUM LACTATE, POTASSIUM CHLORIDE, AND CALCIUM CHLORIDE 1000 ML: .6; .31; .03; .02 INJECTION, SOLUTION INTRAVENOUS at 07:15

## 2025-07-20 RX ADMIN — ESOMEPRAZOLE MAGNESIUM 20 MG: 40 FOR SUSPENSION ORAL at 16:06

## 2025-07-20 RX ADMIN — HYDROCORTISONE SODIUM SUCCINATE 100 MG: 100 INJECTION, POWDER, FOR SOLUTION INTRAMUSCULAR; INTRAVENOUS at 11:47

## 2025-07-20 RX ADMIN — PIPERACILLIN SODIUM AND TAZOBACTAM SODIUM 3.38 G: 3; .375 INJECTION, SOLUTION INTRAVENOUS at 04:33

## 2025-07-20 RX ADMIN — HYDROMORPHONE HYDROCHLORIDE 0.2 MG: 0.2 INJECTION, SOLUTION INTRAMUSCULAR; INTRAVENOUS; SUBCUTANEOUS at 07:37

## 2025-07-20 RX ADMIN — CALCIUM GLUCONATE 2 G: 20 INJECTION, SOLUTION INTRAVENOUS at 20:10

## 2025-07-20 RX ADMIN — SODIUM CHLORIDE, POTASSIUM CHLORIDE, SODIUM LACTATE AND CALCIUM CHLORIDE 1000 ML: 600; 310; 30; 20 INJECTION, SOLUTION INTRAVENOUS at 08:44

## 2025-07-20 RX ADMIN — SODIUM BICARBONATE 50 MEQ: 84 INJECTION INTRAVENOUS at 08:24

## 2025-07-20 RX ADMIN — ETOMIDATE 20 MG: 2 INJECTION INTRAVENOUS at 13:22

## 2025-07-20 RX ADMIN — SODIUM CHLORIDE, SODIUM LACTATE, POTASSIUM CHLORIDE, AND CALCIUM CHLORIDE 500 ML: .6; .31; .03; .02 INJECTION, SOLUTION INTRAVENOUS at 03:17

## 2025-07-20 RX ADMIN — HYDROCORTISONE SODIUM SUCCINATE 100 MG: 100 INJECTION, POWDER, FOR SOLUTION INTRAMUSCULAR; INTRAVENOUS at 04:57

## 2025-07-20 RX ADMIN — MIDAZOLAM 2 MG: 1 INJECTION INTRAMUSCULAR; INTRAVENOUS at 23:29

## 2025-07-20 RX ADMIN — VASOPRESSIN IN 0.9% SODIUM CHLORIDE 0.03 UNITS/MIN: 20 INJECTION INTRAVENOUS at 13:20

## 2025-07-20 RX ADMIN — VASOPRESSIN IN 0.9% SODIUM CHLORIDE 0.03 UNITS/MIN: 20 INJECTION INTRAVENOUS at 03:51

## 2025-07-20 RX ADMIN — HYDROMORPHONE HYDROCHLORIDE 0.5 MG: 0.5 INJECTION, SOLUTION INTRAMUSCULAR; INTRAVENOUS; SUBCUTANEOUS at 23:29

## 2025-07-20 RX ADMIN — Medication 70 PERCENT: at 14:00

## 2025-07-20 RX ADMIN — Medication 6 L/MIN: at 09:26

## 2025-07-20 RX ADMIN — SODIUM CHLORIDE, SODIUM LACTATE, POTASSIUM CHLORIDE, AND CALCIUM CHLORIDE 125 ML/HR: .6; .31; .03; .02 INJECTION, SOLUTION INTRAVENOUS at 03:26

## 2025-07-20 RX ADMIN — SODIUM BICARBONATE 125 ML/HR: 84 INJECTION, SOLUTION INTRAVENOUS at 19:14

## 2025-07-20 RX ADMIN — SODIUM CHLORIDE, SODIUM LACTATE, POTASSIUM CHLORIDE, AND CALCIUM CHLORIDE 1000 ML: .6; .31; .03; .02 INJECTION, SOLUTION INTRAVENOUS at 14:01

## 2025-07-20 RX ADMIN — SODIUM CHLORIDE, SODIUM LACTATE, POTASSIUM CHLORIDE, AND CALCIUM CHLORIDE 1000 ML: .6; .31; .03; .02 INJECTION, SOLUTION INTRAVENOUS at 05:10

## 2025-07-20 RX ADMIN — SENNOSIDES 10 ML: 8.8 LIQUID ORAL at 14:20

## 2025-07-20 RX ADMIN — VASOPRESSIN IN 0.9% SODIUM CHLORIDE 0.03 UNITS/MIN: 20 INJECTION INTRAVENOUS at 15:30

## 2025-07-20 RX ADMIN — HYDROMORPHONE HYDROCHLORIDE 0.4 MG: 0.5 INJECTION, SOLUTION INTRAMUSCULAR; INTRAVENOUS; SUBCUTANEOUS at 12:07

## 2025-07-20 RX ADMIN — HYDROMORPHONE HYDROCHLORIDE 0.4 MG: 0.5 INJECTION, SOLUTION INTRAMUSCULAR; INTRAVENOUS; SUBCUTANEOUS at 09:56

## 2025-07-20 RX ADMIN — SODIUM BICARBONATE 125 ML/HR: 84 INJECTION, SOLUTION INTRAVENOUS at 09:07

## 2025-07-20 RX ADMIN — MIDAZOLAM HYDROCHLORIDE 2 MG: 1 INJECTION, SOLUTION INTRAMUSCULAR; INTRAVENOUS at 13:23

## 2025-07-20 RX ADMIN — PIPERACILLIN SODIUM AND TAZOBACTAM SODIUM 3.38 G: 3; .375 INJECTION, SOLUTION INTRAVENOUS at 22:50

## 2025-07-20 RX ADMIN — PIPERACILLIN SODIUM AND TAZOBACTAM SODIUM 3.38 G: 3; .375 INJECTION, SOLUTION INTRAVENOUS at 17:20

## 2025-07-20 RX ADMIN — ALBUMIN HUMAN 50 G: 0.25 SOLUTION INTRAVENOUS at 06:16

## 2025-07-20 RX ADMIN — HYDROMORPHONE HYDROCHLORIDE 0.2 MG: 0.2 INJECTION, SOLUTION INTRAMUSCULAR; INTRAVENOUS; SUBCUTANEOUS at 04:19

## 2025-07-20 RX ADMIN — MICAFUNGIN IN SODIUM CHLORIDE 100 MG: 100 INJECTION INTRAVENOUS at 17:28

## 2025-07-20 RX ADMIN — HYDROCORTISONE SODIUM SUCCINATE 100 MG: 100 INJECTION, POWDER, FOR SOLUTION INTRAMUSCULAR; INTRAVENOUS at 20:10

## 2025-07-20 RX ADMIN — FENTANYL CITRATE 25 MCG/HR: 0.05 INJECTION, SOLUTION INTRAMUSCULAR; INTRAVENOUS at 16:30

## 2025-07-20 RX ADMIN — NOREPINEPHRINE BITARTRATE 0.2 MCG/KG/MIN: 8 INJECTION, SOLUTION INTRAVENOUS at 03:00

## 2025-07-20 RX ADMIN — NOREPINEPHRINE BITARTRATE 0.9 MCG/KG/MIN: 8 INJECTION, SOLUTION INTRAVENOUS at 17:19

## 2025-07-20 RX ADMIN — MIDAZOLAM 2 MG: 1 INJECTION INTRAMUSCULAR; INTRAVENOUS at 13:23

## 2025-07-20 RX ADMIN — PIPERACILLIN SODIUM AND TAZOBACTAM SODIUM 3.38 G: 3; .375 INJECTION, SOLUTION INTRAVENOUS at 11:32

## 2025-07-20 RX ADMIN — FENTANYL CITRATE 50 MCG: 50 INJECTION INTRAMUSCULAR; INTRAVENOUS at 13:24

## 2025-07-20 RX ADMIN — SODIUM CHLORIDE, SODIUM LACTATE, POTASSIUM CHLORIDE, AND CALCIUM CHLORIDE 125 ML/HR: .6; .31; .03; .02 INJECTION, SOLUTION INTRAVENOUS at 19:00

## 2025-07-20 SDOH — SOCIAL STABILITY: SOCIAL INSECURITY: ARE YOU OR HAVE YOU BEEN THREATENED OR ABUSED PHYSICALLY, EMOTIONALLY, OR SEXUALLY BY ANYONE?: NO

## 2025-07-20 SDOH — SOCIAL STABILITY: SOCIAL INSECURITY: HAVE YOU HAD THOUGHTS OF HARMING ANYONE ELSE?: NO

## 2025-07-20 SDOH — ECONOMIC STABILITY: INCOME INSECURITY: IN THE PAST 12 MONTHS HAS THE ELECTRIC, GAS, OIL, OR WATER COMPANY THREATENED TO SHUT OFF SERVICES IN YOUR HOME?: NO

## 2025-07-20 SDOH — ECONOMIC STABILITY: FOOD INSECURITY: WITHIN THE PAST 12 MONTHS, THE FOOD YOU BOUGHT JUST DIDN'T LAST AND YOU DIDN'T HAVE MONEY TO GET MORE.: NEVER TRUE

## 2025-07-20 SDOH — SOCIAL STABILITY: SOCIAL INSECURITY: WITHIN THE LAST YEAR, HAVE YOU BEEN AFRAID OF YOUR PARTNER OR EX-PARTNER?: NO

## 2025-07-20 SDOH — SOCIAL STABILITY: SOCIAL INSECURITY: WERE YOU ABLE TO COMPLETE ALL THE BEHAVIORAL HEALTH SCREENINGS?: YES

## 2025-07-20 SDOH — SOCIAL STABILITY: SOCIAL INSECURITY: WITHIN THE LAST YEAR, HAVE YOU BEEN HUMILIATED OR EMOTIONALLY ABUSED IN OTHER WAYS BY YOUR PARTNER OR EX-PARTNER?: NO

## 2025-07-20 SDOH — SOCIAL STABILITY: SOCIAL INSECURITY: DO YOU FEEL ANYONE HAS EXPLOITED OR TAKEN ADVANTAGE OF YOU FINANCIALLY OR OF YOUR PERSONAL PROPERTY?: NO

## 2025-07-20 SDOH — SOCIAL STABILITY: SOCIAL INSECURITY: ARE THERE ANY APPARENT SIGNS OF INJURIES/BEHAVIORS THAT COULD BE RELATED TO ABUSE/NEGLECT?: NO

## 2025-07-20 SDOH — ECONOMIC STABILITY: FOOD INSECURITY: WITHIN THE PAST 12 MONTHS, YOU WORRIED THAT YOUR FOOD WOULD RUN OUT BEFORE YOU GOT THE MONEY TO BUY MORE.: NEVER TRUE

## 2025-07-20 SDOH — SOCIAL STABILITY: SOCIAL INSECURITY: DOES ANYONE TRY TO KEEP YOU FROM HAVING/CONTACTING OTHER FRIENDS OR DOING THINGS OUTSIDE YOUR HOME?: NO

## 2025-07-20 SDOH — SOCIAL STABILITY: SOCIAL INSECURITY: ABUSE: ADULT

## 2025-07-20 SDOH — SOCIAL STABILITY: SOCIAL INSECURITY: DO YOU FEEL UNSAFE GOING BACK TO THE PLACE WHERE YOU ARE LIVING?: NO

## 2025-07-20 SDOH — SOCIAL STABILITY: SOCIAL INSECURITY: HAS ANYONE EVER THREATENED TO HURT YOUR FAMILY OR YOUR PETS?: NO

## 2025-07-20 ASSESSMENT — COGNITIVE AND FUNCTIONAL STATUS - GENERAL
DRESSING REGULAR UPPER BODY CLOTHING: A LITTLE
STANDING UP FROM CHAIR USING ARMS: A LOT
TOILETING: A LITTLE
MOVING FROM LYING ON BACK TO SITTING ON SIDE OF FLAT BED WITH BEDRAILS: TOTAL
PERSONAL GROOMING: A LITTLE
TOILETING: TOTAL
MOVING TO AND FROM BED TO CHAIR: A LOT
MOVING TO AND FROM BED TO CHAIR: TOTAL
DRESSING REGULAR LOWER BODY CLOTHING: TOTAL
DAILY ACTIVITIY SCORE: 6
CLIMB 3 TO 5 STEPS WITH RAILING: TOTAL
WALKING IN HOSPITAL ROOM: A LOT
CLIMB 3 TO 5 STEPS WITH RAILING: A LOT
DRESSING REGULAR LOWER BODY CLOTHING: A LITTLE
STANDING UP FROM CHAIR USING ARMS: TOTAL
WALKING IN HOSPITAL ROOM: TOTAL
MOVING FROM LYING ON BACK TO SITTING ON SIDE OF FLAT BED WITH BEDRAILS: A LITTLE
EATING MEALS: A LITTLE
TURNING FROM BACK TO SIDE WHILE IN FLAT BAD: TOTAL
DRESSING REGULAR UPPER BODY CLOTHING: TOTAL
MOBILITY SCORE: 6
DAILY ACTIVITIY SCORE: 18
PATIENT BASELINE BEDBOUND: NO
EATING MEALS: TOTAL
HELP NEEDED FOR BATHING: A LITTLE
PERSONAL GROOMING: TOTAL
MOBILITY SCORE: 14
TURNING FROM BACK TO SIDE WHILE IN FLAT BAD: A LITTLE
HELP NEEDED FOR BATHING: TOTAL

## 2025-07-20 ASSESSMENT — RESPIRATORY DISTRESS OBSERVATION SCALE (RDOS)
RDOS TOTAL SCORE: 1
LOOK OF FEAR: 0 - NONE
RESTLESS NONPURPOSEFUL MOVEMENTS: 0 - NONE
RESPIRATORY RATE PER MINUTE: 1 - 19-30 BREATHS
PARADOXICAL BREATHING PATTERN: 0 - NONE
HEART RATE PER MINUTE: 0 - <90 BEATS
INVOLUNTARY NASAL FLARING: 0 - NONE
GRUNTING AT END OF EXPIRATION: 0 - NONE
ACCESSORY MUSCLE RISE IN CLAVICLE DURING INSPIRATION: 0 - NONE

## 2025-07-20 ASSESSMENT — ACTIVITIES OF DAILY LIVING (ADL)
FEEDING YOURSELF: INDEPENDENT
HEARING - LEFT EAR: FUNCTIONAL
WALKS IN HOME: INDEPENDENT
ADEQUATE_TO_COMPLETE_ADL: YES
JUDGMENT_ADEQUATE_SAFELY_COMPLETE_DAILY_ACTIVITIES: YES
GROOMING: INDEPENDENT
PATIENT'S MEMORY ADEQUATE TO SAFELY COMPLETE DAILY ACTIVITIES?: YES
TOILETING: INDEPENDENT
HEARING - RIGHT EAR: FUNCTIONAL
DRESSING YOURSELF: INDEPENDENT
BATHING: INDEPENDENT
LACK_OF_TRANSPORTATION: NO

## 2025-07-20 ASSESSMENT — PAIN DESCRIPTION - DESCRIPTORS: DESCRIPTORS: ACHING

## 2025-07-20 ASSESSMENT — LIFESTYLE VARIABLES
HOW OFTEN DO YOU HAVE 6 OR MORE DRINKS ON ONE OCCASION: NEVER
AUDIT-C TOTAL SCORE: 4
SKIP TO QUESTIONS 9-10: 1
AUDIT-C TOTAL SCORE: 4
HOW MANY STANDARD DRINKS CONTAINING ALCOHOL DO YOU HAVE ON A TYPICAL DAY: 1 OR 2
HOW OFTEN DO YOU HAVE A DRINK CONTAINING ALCOHOL: 4 OR MORE TIMES A WEEK

## 2025-07-20 ASSESSMENT — PATIENT HEALTH QUESTIONNAIRE - PHQ9
1. LITTLE INTEREST OR PLEASURE IN DOING THINGS: NOT AT ALL
SUM OF ALL RESPONSES TO PHQ9 QUESTIONS 1 & 2: 0
2. FEELING DOWN, DEPRESSED OR HOPELESS: NOT AT ALL

## 2025-07-20 ASSESSMENT — ENCOUNTER SYMPTOMS
CONSTIPATION: 1
ABDOMINAL PAIN: 1

## 2025-07-20 ASSESSMENT — PAIN - FUNCTIONAL ASSESSMENT
PAIN_FUNCTIONAL_ASSESSMENT: CPOT (CRITICAL CARE PAIN OBSERVATION TOOL)
PAIN_FUNCTIONAL_ASSESSMENT: 0-10
PAIN_FUNCTIONAL_ASSESSMENT: 0-10

## 2025-07-20 ASSESSMENT — PAIN SCALES - GENERAL
PAINLEVEL_OUTOF10: 10 - WORST POSSIBLE PAIN
PAINLEVEL_OUTOF10: 10 - WORST POSSIBLE PAIN

## 2025-07-20 NOTE — CARE PLAN
The patient's goals for the shift include      The clinical goals for the shift include        Problem: Pain - Adult  Goal: Verbalizes/displays adequate comfort level or baseline comfort level  Outcome: Not Progressing     Problem: Safety - Adult  Goal: Free from fall injury  Outcome: Progressing     Problem: Discharge Planning  Goal: Discharge to home or other facility with appropriate resources  Outcome: Not Progressing     Problem: Chronic Conditions and Co-morbidities  Goal: Patient's chronic conditions and co-morbidity symptoms are monitored and maintained or improved  Outcome: Not Progressing     Problem: Nutrition  Goal: Nutrient intake appropriate for maintaining nutritional needs  Outcome: Not Progressing     Problem: Skin  Goal: Decreased wound size/increased tissue granulation at next dressing change  Outcome: Not Progressing  Flowsheets (Taken 7/20/2025 1016)  Decreased wound size/increased tissue granulation at next dressing change:   Protective dressings over bony prominences   Promote sleep for wound healing  Goal: Participates in plan/prevention/treatment measures  Outcome: Not Progressing  Flowsheets (Taken 7/20/2025 1016)  Participates in plan/prevention/treatment measures: Elevate heels  Goal: Prevent/manage excess moisture  Outcome: Not Progressing  Flowsheets (Taken 7/20/2025 1016)  Prevent/manage excess moisture:   Moisturize dry skin   Follow provider orders for dressing changes   Monitor for/manage infection if present  Goal: Prevent/minimize sheer/friction injuries  Outcome: Not Progressing  Flowsheets (Taken 7/20/2025 1016)  Prevent/minimize sheer/friction injuries:   HOB 30 degrees or less   Turn/reposition every 2 hours/use positioning/transfer devices   Use pull sheet  Goal: Promote/optimize nutrition  Outcome: Not Progressing  Flowsheets (Taken 7/20/2025 1016)  Promote/optimize nutrition: Monitor/record intake including meals  Goal: Promote skin healing  Outcome: Not  Progressing  Flowsheets (Taken 7/20/2025 1016)  Promote skin healing:   Ensure correct size (line/device) and apply per  instructions   Rotate device position/do not position patient on device   Turn/reposition every 2 hours/use positioning/transfer devices       Problem: Pain - Adult  Goal: Verbalizes/displays adequate comfort level or baseline comfort level  Outcome: Not Progressing     Problem: Discharge Planning  Goal: Discharge to home or other facility with appropriate resources  Outcome: Not Progressing     Problem: Chronic Conditions and Co-morbidities  Goal: Patient's chronic conditions and co-morbidity symptoms are monitored and maintained or improved  Outcome: Not Progressing     Problem: Nutrition  Goal: Nutrient intake appropriate for maintaining nutritional needs  Outcome: Not Progressing     Problem: Skin  Goal: Decreased wound size/increased tissue granulation at next dressing change  Outcome: Not Progressing  Flowsheets (Taken 7/20/2025 1016)  Decreased wound size/increased tissue granulation at next dressing change:   Protective dressings over bony prominences   Promote sleep for wound healing  Goal: Participates in plan/prevention/treatment measures  Outcome: Not Progressing  Flowsheets (Taken 7/20/2025 1016)  Participates in plan/prevention/treatment measures: Elevate heels  Goal: Prevent/manage excess moisture  Outcome: Not Progressing  Flowsheets (Taken 7/20/2025 1016)  Prevent/manage excess moisture:   Moisturize dry skin   Follow provider orders for dressing changes   Monitor for/manage infection if present  Goal: Prevent/minimize sheer/friction injuries  Outcome: Not Progressing  Flowsheets (Taken 7/20/2025 1016)  Prevent/minimize sheer/friction injuries:   HOB 30 degrees or less   Turn/reposition every 2 hours/use positioning/transfer devices   Use pull sheet  Goal: Promote/optimize nutrition  Outcome: Not Progressing  Flowsheets (Taken 7/20/2025 1016)  Promote/optimize  nutrition: Monitor/record intake including meals  Goal: Promote skin healing  Outcome: Not Progressing  Flowsheets (Taken 7/20/2025 1016)  Promote skin healing:   Ensure correct size (line/device) and apply per  instructions   Rotate device position/do not position patient on device   Turn/reposition every 2 hours/use positioning/transfer devices

## 2025-07-20 NOTE — CONSULTS
Critical Care Medicine Consult      Reason For Consult  Post op critical care management    History Of Present Illness  Jaja Bradshaw is a 70 y.o. female with past medical history of small cell carcinoma of paratacheum/mediastinum (currently treated with resection paratracheal mass 04/30/2025 and carboplatin and etoposide), COPD, hypertension, hypothyroidism, chronic lower extremity pain,  tobacco dependence, and protein calorie malnutrition presented to [facility] ED with complaints of constipation and abdominal pain. Upon ED workup, temperature 37.7 °C, heart rate 111, respiratory rate 20, blood pressure 101/65, and SpO2 92% on room air. ED labs revealed blood glucose 177, sodium 136, potassium 4.1 (mild hemolysis), chloride 101, bicarbonate 27, anion gap 12, BUN 16, creatinine 0.66, alkaline phosphatase 116, ALT 17, AST 21, bilirubin total 0.5, lactate 2.6>> 3.0, lipase 4, WBC 2.7, RBC 3.08, hemoglobin 10.4, hematocrit 31.3, and platelets 146.  CT abdomen and pelvis revealed consolation of the imaging findings concerning for a perforated viscus with intra-abdominal free air, spillage of stool into the lower pelvic cavity, and associated peritonitis, the exact source is not yet although there is some stool pulling near the cecum and the appendiceal base and near the sigmoid colon, the appendix is thought to be visualized in the right lower quadrant and appears normal although the exam is very limited due to surrounding free fluid and peritonitis, moderate volume of fluid is present throughout the abdomen suspicious for evolving peritonitis, rectovaginal prolapse, lower esophagus is distended with liquid contents correlate with any esophageal dysmotility, and large volume of stool is present throughout the colon. ED interventions included 1 L NS bolus, Zosyn 3.375 g IV x 1, morphine 4 mg IV x 1, fentanyl 50 mcg IV x 1, consult to surgery, and admission for further management..  Patient was emergently taken to  the OR for exploratory laparotomy and ostomy placement.  Patient was admitted to the ICU postoperatively and critical care medicine was consulted.  Per postoperative discussion with Dr. Andrews, anesthesiologist, patient received 50 g albumin, 3.5 L crystalloid, and Flagyl during the procedure.  Patient had 100 mL estimated blood loss during procedure and 225 mL of urine output.     Patient seen and examined in ICU bed 11.  Patient lying in bed,drowsy and oriented x3. She arrived to the ICU on 0.2 mcg/kg/min of norepinephrine and lactated Ringer's at 100 mL/h.  Patient reports that she came to the hospital when she was having abdominal pain and unable to have a bowel movement for 4 days. She complains of abdominal pain and feeling like she has to urinate and have a bowel movement. She denies any other complaints at this time. She smokes about 1/2 pack per day and drinks 1 beer daily. She denies any drug use. She is undergoing chemo for small cell carcinoma and her last treatment was on Thursday. She had a prior PEG tube but was removed on 07/08/2025 due to intolerance of tube feeding and complications.    Medical History[1]  Surgical History[2]  Prescriptions Prior to Admission[3]  Latex and Codeine  Social History[4]  Family History[5]    Scheduled Medications:   Scheduled Medications[6]     Continuous Medications:   Continuous Medications[7]     PRN Medications:   PRN Medications[8]    Review of Systems:  Review of Systems   Gastrointestinal:  Positive for abdominal pain and constipation.        Objective   Vitals:  Most Recent:  Vitals:    07/20/25 0430   BP: 109/62   Pulse: 92   Resp: 20   Temp:    SpO2: 92%       24hr Min/Max:  Temp  Min: 36.6 °C (97.9 °F)  Max: 37.7 °C (99.9 °F)  Pulse  Min: 87  Max: 111  BP  Min: 79/60  Max: 143/73  Resp  Min: 14  Max: 40  SpO2  Min: 91 %  Max: 100 %    LDA:   CVC 07/20/25 Triple lumen (Active)   Placement Date/Time: 07/20/25 (c) 8093   Hand Hygiene Performed Prior to CVC  Insertion: Yes  Site Prep: Chlorhexidine   Site Prep Agent has Completely Dried Before Insertion: Yes  All 5 Sterile Barriers Used (Gloves, Gown, Cap, Mask, Large Sterile Ira...   Number of days: 0       Arterial Line 07/20/25 Left Radial (Active)   Placement Date/Time: 07/20/25 (c) 0020   Size: 20 G  Orientation: Left  Location: Radial  Securement Method: Transparent dressing  Patient Tolerance: Tolerated well   Number of days: 0       Urethral Catheter Non-latex;Straight-tip 16 Fr. (Active)   Placement Date/Time: 07/20/25 0001   Placed by: (c)   Hand Hygiene Completed: Yes  Catheter Type: Non-latex;Straight-tip  Tube Size (Fr.): 16 Fr.  Catheter Balloon Size: 10 mL  Urine Returned: Yes   Number of days: 0       Colostomy Descending/sigmoid LLQ (Active)   Placement Date/Time: 07/20/25 0205   Placed by: (c)   Hand Hygiene Completed: Yes  Colostomy Type: (c) Descending/sigmoid  Location: LLQ   Number of days: 0         Vent settings:       Hemodynamic parameters for last 24 hours:         Intake/Output Summary (Last 24 hours) at 7/20/2025 0440  Last data filed at 7/20/2025 0217  Gross per 24 hour   Intake 4700 ml   Output 475 ml   Net 4225 ml           Physical exam:    Physical Exam  Constitutional:       General: She is awake.      Appearance: She is cachectic. She is ill-appearing.      Interventions: Nasal cannula in place.      Comments: Frail, cachetic ill-appearing female.   HENT:      Head: Normocephalic.      Nose: Nose normal.      Mouth/Throat:      Mouth: Mucous membranes are moist.     Eyes:      Extraocular Movements: Extraocular movements intact.      Conjunctiva/sclera: Conjunctivae normal.      Pupils: Pupils are equal, round, and reactive to light.       Cardiovascular:      Rate and Rhythm: Normal rate and regular rhythm.      Pulses: Normal pulses.      Heart sounds: Normal heart sounds. No murmur heard.  Pulmonary:      Effort: Pulmonary effort is normal. No respiratory distress.      Breath  sounds: Normal breath sounds.   Abdominal:      General: Bowel sounds are absent. There is distension.      Tenderness: There is abdominal tenderness.      Comments: Midline abdominal incision with surgical dressing intact with bloody drainage outlined on dressing. Ostomy to left lower quadrant pink in color with blood-tinged fluid in ostomy bag   Genitourinary:     Comments: Lock catheter with clear terra urine    Musculoskeletal:         General: Normal range of motion.      Cervical back: Normal range of motion.      Right lower leg: No edema.      Left lower leg: No edema.      Comments: Generalized muscle wasting, temporal muscle wasting     Skin:     General: Skin is warm and dry.      Capillary Refill: Capillary refill takes less than 2 seconds.      Coloration: Skin is pale.     Neurological:      General: No focal deficit present.      Mental Status: She is oriented to person, place, and time.      Cranial Nerves: Cranial nerves 2-12 are intact.      Sensory: Sensation is intact.      Motor: Motor function is intact.     Psychiatric:         Behavior: Behavior is cooperative.          Lab/Radiology/Diagnostic Review:  Results for orders placed or performed during the hospital encounter of 07/19/25 (from the past 24 hours)   CBC and Auto Differential   Result Value Ref Range    WBC 2.7 (L) 4.4 - 11.3 x10*3/uL    nRBC 0.0 0.0 - 0.0 /100 WBCs    RBC 3.08 (L) 4.00 - 5.20 x10*6/uL    Hemoglobin 10.4 (L) 12.0 - 16.0 g/dL    Hematocrit 31.3 (L) 36.0 - 46.0 %     (H) 80 - 100 fL    MCH 33.8 26.0 - 34.0 pg    MCHC 33.2 32.0 - 36.0 g/dL    RDW 12.4 11.5 - 14.5 %    Platelets 146 (L) 150 - 450 x10*3/uL    Immature Granulocytes %, Automated 0.7 0.0 - 0.9 %    Immature Granulocytes Absolute, Automated 0.02 0.00 - 0.70 x10*3/uL   Comprehensive metabolic panel   Result Value Ref Range    Glucose 177 (H) 74 - 99 mg/dL    Sodium 136 136 - 145 mmol/L    Potassium 4.1 3.5 - 5.3 mmol/L    Chloride 101 98 - 107 mmol/L     Bicarbonate 27 21 - 32 mmol/L    Anion Gap 12 10 - 20 mmol/L    Urea Nitrogen 16 6 - 23 mg/dL    Creatinine 0.66 0.50 - 1.05 mg/dL    eGFR >90 >60 mL/min/1.73m*2    Calcium 9.6 8.6 - 10.3 mg/dL    Albumin 3.4 3.4 - 5.0 g/dL    Alkaline Phosphatase 116 33 - 136 U/L    Total Protein 5.7 (L) 6.4 - 8.2 g/dL    AST 21 9 - 39 U/L    Bilirubin, Total 0.5 0.0 - 1.2 mg/dL    ALT 17 7 - 45 U/L   Lipase   Result Value Ref Range    Lipase 4 (L) 9 - 82 U/L   Manual Differential   Result Value Ref Range    Neutrophils %, Manual 45.0 40.0 - 80.0 %    Bands %, Manual 18.0 0.0 - 5.0 %    Lymphocytes %, Manual 21.0 13.0 - 44.0 %    Monocytes %, Manual 13.0 2.0 - 10.0 %    Eosinophils %, Manual 1.0 0.0 - 6.0 %    Basophils %, Manual 0.0 0.0 - 2.0 %    Metamyelocytes %, Manual 2.0 0.0 - 0.0 %    Seg Neutrophils Absolute, Manual 1.22 1.20 - 7.00 x10*3/uL    Bands Absolute, Manual 0.49 0.00 - 0.70 x10*3/uL    Lymphocytes Absolute, Manual 0.57 (L) 1.20 - 4.80 x10*3/uL    Monocytes Absolute, Manual 0.35 0.10 - 1.00 x10*3/uL    Eosinophils Absolute, Manual 0.03 0.00 - 0.70 x10*3/uL    Basophils Absolute, Manual 0.00 0.00 - 0.10 x10*3/uL    Metamyelocytes Absolute, Manual 0.05 0.00 - 0.00 x10*3/uL    Total Cells Counted 100     Neutrophils Absolute, Manual 1.71 1.20 - 7.70 x10*3/uL    RBC Morphology See Below     Toxic Granulation Present    Lactate   Result Value Ref Range    Lactate 2.6 (H) 0.4 - 2.0 mmol/L   Lactate   Result Value Ref Range    Lactate 3.0 (H) 0.4 - 2.0 mmol/L     Imaging  CT abdomen pelvis w IV contrast  Result Date: 7/19/2025  1. Constellation of the imaging findings concerning for a perforated viscus, with intra-abdominal free air, spillage of stool into the lower pelvic cavity, and associated peritonitis. The exact source is not identified, although there is some stool pooling near the cecum in the appendiceal base and near the sigmoid colon. The appendix is thought to be visualized in the right lower quadrant and  appears normal, although the exam is very limited due to surrounding free fluid and peritonitis. Surgical consultation is recommended. 2. Moderate volume of fluid is present throughout the abdomen, suspicious for evolving peritonitis. 3. Rectovaginal prolapse. 4. Lower esophagus is distended with liquid contents, correlate with any esophageal dysmotility. 5. Large volume of stool is present throughout the colon.   MACRO: Sara George discussed the significance and urgency of this critical finding by EPIC secure chat with  KRISTINA LO on 7/19/2025 at 8:33 pm.  (**-RCF-**) Findings:  See findings.   Signed by: Sara George 7/19/2025 8:34 PM Dictation workstation:   [id number]    EMG & nerve conduction  Result Date: 7/17/2025  Impression: This is an abnormal, but somewhat limited study of the right upper and lower extremities with the following findings: There is evidence of a sensory greater than motor predominant, generalized axonal peripheral polyneuropathy.  However, the absent, prolonged, and/or impersistent F-wave latencies may be suggestive of a proximal neurogenic or demyelinating process. The examination was terminated early due to the patient's toleration, and significant pain from the study.  Thus, additional nerve conduction studies and needle examination could not be performed.  Within the limits of this study, there is no definite evidence of median neuropathy at the wrist or ulnar neuropathy at the elbow.  However this study is indeterminant for the evaluation of cervical radiculopathy, lumbosacral radiculopathy, plexopathy, and myopathy. A neuromuscular ultrasound was also performed and will be reported separately. Rene Stacy MD --------------------------------------------------------------------------------------------------------------------------------------------------------------------------------------------------------------------------------------------------------------  -------------------------------------------------------------------------------------------------------------------------------------------------------------------------------------------------------------------------------------------------------------- -------------------------------------------------------------------------------------------------------------------------------------------------------------------------------------------------------------------------------------------------------------- -------------------------------------------------------------------------------------------------------------------------------------------------------------------------------------------      Cardiology, Vascular, and Other Imaging  Point of Care Ultrasound  Result Date: 7/17/2025  Rene Stacy MD     7/17/2025  9:46 PM Performed by: Rene Stacy MD Authorized by: Rene Stacy MD          Assessment/Plan   Assessment & Plan  Bowel perforation (Multi)        Jaja Bradshaw is a 70 y.o. female with past medical history of small cell carcinoma of paratacheum/mediastinum (currently treated with resection paratracheal mass 04/30/2025 and carboplatin and etoposide), COPD, hypertension, hypothyroidism, chronic lower extremity pain,  tobacco dependence, and protein calorie malnutrition presented to [facility] ED with complaints of constipation and abdominal pain.    Bowel perforation with peritonitis s/p exploratory laparotomy, washout, and ostomy placement  - Presented with abdominal pain and constipation x4 days  - CT abdomen/pelvis revealed bowel perforation and peritonitis  - S/p 1L NS bolus and 3.375gm Zosyn in ED  - S/p 3.5L crystalloid bolus and flagyl on OR  - S/p exploratory laparotomy, washout, and ostomy placement, POD# 0  - Continue IV hydration with LR at 125ml/hr  - 1L LR bolus  - Follow CBC and trend fevers   - Follow BMP, magnesium, and phosphorus  - NPO  - Trend lactate every 4 hours until  less than 2  - Follow blood cultures  - Follow tissue culture  - Continue IV Zosyn for empiric coverage, de-escalate as appropriate  - Continue vasopressor support with norepinephrine, titrate to maintain MAP greater than 60 mmHg  - Add vasopressin, do not titrate  - Add Solucortef 100mg every 8 hours  - Strict intake and output  - Monitor ostomy site   - Maintain surgical dressing and reinforce prn  - Monitor for signs of bleeding  - Transfuse PRBC for hemoglobin less than 7 or greater than 7 with signs of bleeding and hemodynamic instability  - Surgery following, input appreciated    Septic shock 2/2 bowel perforation with peritonitis  - Presented with abdominal pain and constipation x4 days  - CT abdomen/pelvis revealed bowel perforation and peritonitis  - SIRS criteria 3/4 met: tachypnea, tachycardia, and WBC 2.7  - Source: intra-abdominal  - Organ dysfunction: hypotension, lactic acidosis  - S/p 1L NS bolus and 3.375gm Zosyn in ED  - S/p 3.5L crystalloid bolus and flagyl on OR  - S/p exploratory laparotomy, washout, and ostomy placement POD 0  - Continue IV hydration with LR at 125ml/hr  - 1L LR bolus  - Follow CBC and trend fevers  -Trend lactate every 4 hours until less than 2  - Follow blood cultures  - Follow tissue culture  - Continue IV Zosyn for empiric coverage, de-escalate as appropriate  - Continue vasopressor support with norepinephrine, titrate to maintain MAP greater than 60 mmHg  - Cortisol level  - Add vasopressin, do not titrate  - Add Solucortef 100mg every 8 hours  - Strict intake and output    Small cell carcinoma of paratracheum/mediastinum on chemo  - Immunocompromised and cachetic  - Currently being treated with carbolatin/etoposide, last chemo treatment was Thursday, 07/17/2025  - Neutropenic precautions  - Chemo precautions  - Follow with oncology at discharge    COPD without acute exacerbation  -Current smoker  -Moderate emphysema on CT   - No home O2  - Does not appear to be acutely  exacerbated  - Oxygen prn to maintain SPO2 >90%  - Incentive spirometry    Tobacco dependence  -Smokes 1/2 pack per day  - Tobacco cessation counseling  - NRT offered and refused    Hypothyroidism  - Continue home synthroid when able to take oral     Severe protein-calorie malnutrition  - Cachetic with generalized muscle wasting and temporal muscle wasting  - PEG tube removed on 07/08/2025 due to intolerance of tube feeding  - BMI 15.82  - Total protein 5.7  - Nutrition consult    I spent 95 minutes of cumulative critical care time with the patient.  Greater than 50% of that time was spent in the direct collaboration and or coordination of care of the patient.     Dragon dictation software was used to dictate this note and thus there may be minor errors in translation/transcription including garbled speech or misspellings. Please contact for clarification if needed.       Addendum 07/20/2025 0744: Noted patient's hemoglobin was 4.8 and platelets 84 on postop labs. STAT type and screen ordered and 2 units PRBC ordered. Also ordered fibrinogen and coagulation screen. Notified by blood bank that patient had antibodies on a previous type and screen and may have to order blood from [place]. Notified by bedside nurse of increasing vasopressor requirements despite currently receiving fluid bolus. 1 unit uncrossed trauma blood ordered for post operative hemorrhagic shock. Patient re-examined and abdomen slightly distended and surgical dressing intact and re-inforced with ABDs due to drainage. Pinkish-red drainage noted on surgical dressing. Ostomy pink in color with clear red-tinged fluid in ostomy bag. Called and discussed case with Dr. Suresh, surgeon and he recommended holding off on trauma blood and repeat STAT CBC from needle stick not from a line, coag panel, check CVP and call back with CBC and CVP results. Right internal jugular CVC transduced for CVP and CVP was 1. 50gm albumin IV x1 ordered. Repeat CBC revealed  WBC 7.2, RBC 1.26, hemoglobin 4.3, hematocrit 14.1, and platelets 79.  Called and updated Dr. Suresh about CBC results and CVP of 1 and coag panel pending and he recommended giving the trauma blood that was previously ordered. Bedside nurse notified to administer trauma blood. Coagulation screen and fibrinogen revealed fibrinogen 190, PT 20.8, INR 1.9, and APTT 52.  Dr. Jackson called back and updated on coag screen and fibrinogen level and he recommended giving the additional 2 units of type and crossed PRBCs, 1 pool of cryo, and repeat coagulation screen, fibrinogen and CBC after all blood products transfused.          [1]   Past Medical History:  Diagnosis Date    COPD (chronic obstructive pulmonary disease) (Multi)     Disease of thyroid gland     PONV (postoperative nausea and vomiting)    [2]   Past Surgical History:  Procedure Laterality Date    ESOPHAGOGASTRODUODENOSCOPY W/ PEG N/A 06/06/2025    MEDIASTINOTOMY N/A 01/30/2025    CERVICAL MEDIASTINOSCOPY    OTHER SURGICAL HISTORY  04/17/2020    Tubal ligation   [3]   Medications Prior to Admission   Medication Sig Dispense Refill Last Dose/Taking    acetaminophen (Tylenol) 500 mg tablet Take 2 tablets (1,000 mg) by mouth every 8 hours if needed for mild pain (1 - 3) or moderate pain (4 - 6). 30 tablet 0     aspirin 81 mg EC tablet Take 1 tablet (81 mg) by mouth once daily.       cholecalciferol (Vitamin D3) 25 MCG (1000 UT) tablet Take 2 tablets (2,000 Units) by mouth 5 times a day.       ibuprofen 600 mg tablet Take 1 tablet (600 mg) by mouth every 6 hours. 30 tablet 0     ibuprofen 800 mg tablet Take 1 tablet (800 mg) by mouth every 8 hours if needed for mild pain (1 - 3). 90 tablet 2     levothyroxine (Synthroid, Levoxyl) 88 mcg tablet Take 1 tablet (88 mcg) by mouth early in the morning.. Take on an empty stomach at the same time each day, either 30 to 60 minutes prior to breakfast 90 tablet 1     lidocaine (Xylocaine) 5 % cream cream Apply 1 Application  topically every 6 hours if needed (Apply to painful areas of skin). 45 g 11     [] nystatin (Mycostatin) 100,000 unit/mL suspension Swish and swallow 5 mL (500,000 Units) 4 times a day. 200 mL 2     OLANZapine (ZyPREXA) 5 mg tablet Take 1 tablet (5 mg) by mouth once daily at bedtime. For 4 days starting the evening of treatment. 4 tablet 5     ondansetron (Zofran) 8 mg tablet Take 1 tablet (8 mg) by mouth every 8 hours if needed for nausea or vomiting. 30 tablet 5     pregabalin (Lyrica) 75 mg capsule Take 1 capsule (75 mg) by mouth 2 times a day. 60 capsule 2     prochlorperazine (Compazine) 10 mg tablet Take 1 tablet (10 mg) by mouth every 6 hours if needed for nausea or vomiting. 30 tablet 5    [4]   Social History  Tobacco Use    Smoking status: Every Day     Current packs/day: 0.50     Average packs/day: 0.5 packs/day for 45.5 years (22.8 ttl pk-yrs)     Types: Cigarettes     Start date:      Passive exposure: Current    Smokeless tobacco: Never   Vaping Use    Vaping status: Never Used   Substance Use Topics    Alcohol use: Yes     Alcohol/week: 8.0 standard drinks of alcohol     Types: 8 Cans of beer per week    Drug use: Never   [5]   Family History  Problem Relation Name Age of Onset    Liver cancer Mother     [6] hydrocortisone sodium succinate, 100 mg, intravenous, q8h  lactated Ringer's, 500 mL, intravenous, Once  levothyroxine, 88 mcg, oral, Daily  piperacillin-tazobactam, 3.375 g, intravenous, q6h  pregabalin, 75 mg, oral, BID  [7] lactated Ringer's, 125 mL/hr, Last Rate: 125 mL/hr (25 7616)  norepinephrine, 0.01-1 mcg/kg/min, Last Rate: 0.6 mcg/kg/min (25 6097)  vasopressin, 0.03 Units/min, Last Rate: 0.03 Units/min (25 3508)  [8] PRN medications: HYDROcodone-acetaminophen, HYDROmorphone, ketorolac, oxygen

## 2025-07-20 NOTE — CONSULTS
[facility] - GENERAL MEDICINE CONSULT    HISTORY OF PRESENT ILLNESS     History Of Present Illness (HPI):  Jaja Bradshaw is a 70 y.o. female with past medical history significant for metastatic small cell lung cancer on chemotherapy, COPD, and recent PEG tube removal (7/7) presents with 4 days of constipation and worsening generalized abdominal pain. She denied nausea, vomiting, fever, or chills. Exam initially showed generalized tenderness without guarding; however, CT abdomen/pelvis revealed free intra-peritoneal air, fecal spillage, and peritonitis consistent with bowel perforation, likely colonic. Labs notable for leukopenia (WBC 2.7), anemia (Hgb 10.4), thrombocytopenia (Plt 146), elevated lactate (2.6-3.0), and bandemia (bands 18%). She was started on IV Zosyn in ED. Surgery was consulted and will proceed with emergent exploratory laparotomy. Medicine was asked to see in consultation for perioperative co-management.    ED Course:   Vitals on presentation: T 37.7 °C (99.9 °F)  HR (!) 111  /65  RR 20  O2 (!) 92 % None (Room air)  LABS AND IMAGING     Labs:   CBC with WBC 2.7, Hgb 10.4, Plts 146.   CMP with glucose 177, Na 136, K 4.1, BUN 16, sCr 0.66, alk phos 116, ALT 17, AST 21, bilirubin 0.5. Magnesium 1.74.   . Trop 6.   Lactate 3.0  EKG: - agree with ED interpretation  Imaging - agree with radiology interpretation(s)    ED Course (From ED Provider):  ED Course as of 07/20/25 0348   Sat Jul 19, 2025 2[address] [facility] paged [RS]      ED Course User Index  [RS] Paco Kelly DO         Diagnoses as of 07/20/25 0348   Bowel perforation (Multi)     Relevant Results  Results for orders placed or performed during the hospital encounter of 07/19/25 (from the past 24 hours)   CBC and Auto Differential   Result Value Ref Range    WBC 2.7 (L) 4.4 - 11.3 x10*3/uL    nRBC 0.0 0.0 - 0.0 /100 WBCs    RBC 3.08 (L) 4.00 - 5.20 x10*6/uL    Hemoglobin 10.4 (L) 12.0 - 16.0 g/dL    Hematocrit 31.3  (L) 36.0 - 46.0 %     (H) 80 - 100 fL    MCH 33.8 26.0 - 34.0 pg    MCHC 33.2 32.0 - 36.0 g/dL    RDW 12.4 11.5 - 14.5 %    Platelets 146 (L) 150 - 450 x10*3/uL    Immature Granulocytes %, Automated 0.7 0.0 - 0.9 %    Immature Granulocytes Absolute, Automated 0.02 0.00 - 0.70 x10*3/uL   Comprehensive metabolic panel   Result Value Ref Range    Glucose 177 (H) 74 - 99 mg/dL    Sodium 136 136 - 145 mmol/L    Potassium 4.1 3.5 - 5.3 mmol/L    Chloride 101 98 - 107 mmol/L    Bicarbonate 27 21 - 32 mmol/L    Anion Gap 12 10 - 20 mmol/L    Urea Nitrogen 16 6 - 23 mg/dL    Creatinine 0.66 0.50 - 1.05 mg/dL    eGFR >90 >60 mL/min/1.73m*2    Calcium 9.6 8.6 - 10.3 mg/dL    Albumin 3.4 3.4 - 5.0 g/dL    Alkaline Phosphatase 116 33 - 136 U/L    Total Protein 5.7 (L) 6.4 - 8.2 g/dL    AST 21 9 - 39 U/L    Bilirubin, Total 0.5 0.0 - 1.2 mg/dL    ALT 17 7 - 45 U/L   Lipase   Result Value Ref Range    Lipase 4 (L) 9 - 82 U/L   Manual Differential   Result Value Ref Range    Neutrophils %, Manual 45.0 40.0 - 80.0 %    Bands %, Manual 18.0 0.0 - 5.0 %    Lymphocytes %, Manual 21.0 13.0 - 44.0 %    Monocytes %, Manual 13.0 2.0 - 10.0 %    Eosinophils %, Manual 1.0 0.0 - 6.0 %    Basophils %, Manual 0.0 0.0 - 2.0 %    Metamyelocytes %, Manual 2.0 0.0 - 0.0 %    Seg Neutrophils Absolute, Manual 1.22 1.20 - 7.00 x10*3/uL    Bands Absolute, Manual 0.49 0.00 - 0.70 x10*3/uL    Lymphocytes Absolute, Manual 0.57 (L) 1.20 - 4.80 x10*3/uL    Monocytes Absolute, Manual 0.35 0.10 - 1.00 x10*3/uL    Eosinophils Absolute, Manual 0.03 0.00 - 0.70 x10*3/uL    Basophils Absolute, Manual 0.00 0.00 - 0.10 x10*3/uL    Metamyelocytes Absolute, Manual 0.05 0.00 - 0.00 x10*3/uL    Total Cells Counted 100     Neutrophils Absolute, Manual 1.71 1.20 - 7.70 x10*3/uL    RBC Morphology See Below     Toxic Granulation Present    Lactate   Result Value Ref Range    Lactate 2.6 (H) 0.4 - 2.0 mmol/L   Lactate   Result Value Ref Range    Lactate 3.0 (H) 0.4 -  2.0 mmol/L      Imaging  CT abdomen pelvis w IV contrast  Result Date: 7/19/2025  1. Constellation of the imaging findings concerning for a perforated viscus, with intra-abdominal free air, spillage of stool into the lower pelvic cavity, and associated peritonitis. The exact source is not identified, although there is some stool pooling near the cecum in the appendiceal base and near the sigmoid colon. The appendix is thought to be visualized in the right lower quadrant and appears normal, although the exam is very limited due to surrounding free fluid and peritonitis. Surgical consultation is recommended. 2. Moderate volume of fluid is present throughout the abdomen, suspicious for evolving peritonitis. 3. Rectovaginal prolapse. 4. Lower esophagus is distended with liquid contents, correlate with any esophageal dysmotility. 5. Large volume of stool is present throughout the colon.   MACRO: Sara George discussed the significance and urgency of this critical finding by EPIC secure chat with  KRISTINA LO on 7/19/2025 at 8:33 pm.  (**-RCF-**) Findings:  See findings.   Signed by: Sara George 7/19/2025 8:34 PM Dictation workstation:   [id number]      Cardiology, Vascular, and Other Imaging  No other imaging results found for the past 2 days       PAST HISTORIES AND ALLERGIES     Past Medical History  She has a past medical history of COPD (chronic obstructive pulmonary disease) (Multi), Disease of thyroid gland, and PONV (postoperative nausea and vomiting).    Surgical History  She has a past surgical history that includes Other surgical history (04/17/2020); Mediastinotomy (N/A, 01/30/2025); and Esophagogastroduodenoscopy w/ PEG (N/A, 06/06/2025).     Social History  She reports that she has been smoking cigarettes. She started smoking about 45 years ago. She has a 22.8 pack-year smoking history. She has been exposed to tobacco smoke. She has never used smokeless tobacco. She reports current  alcohol use of about 8.0 standard drinks of alcohol per week. She reports that she does not use drugs.    Family History  Family History[1]    Allergies  Latex and Codeine    MEDICATIONS     Scheduled Medications:  Scheduled Medications[2]  Continuous Medications:  Continuous Medications[3]  PRN Medications:  PRN Medications[4]     REVIEW OF SYSTEMS     12-point ROS reviewed and found to be negative aside from aforementioned positives in HPI    OBJECTIVE     Last Recorded Vitals  BP 90/52   Pulse 90   Temp 36.6 °C (97.9 °F) (Temporal)   Resp 19   Wt (!) 41.8 kg (92 lb 2.4 oz)   SpO2 92%      Physical Exam:  Vital signs and nursing notes reviewed.   Constitutional: Pleasant and cooperative. Laying in bed in no acute distress. Conversant.   Skin: Warm and dry; no obvious lesions, rashes, pallor, or jaundice.   Eyes: EOMI. Anicteric sclera.   ENT: Mucous membranes moist; no obvious injury or deformity appreciated.   Head and Neck: Normocephalic, atraumatic. ROM preserved. Trachea midline. No appreciable JVD.   Respiratory: Non-labored. Lungs clear to auscultation bilaterally without obvious adventitious sounds. Chest rise is equal.  Cardiovascular: RRR. No gross murmur, gallop, or rub. Extremities are warm and well-perfused with good capillary refill (< 3 seconds). No chest wall tenderness.   GI: Abdomen soft, diffusely tender, nondistended. No obvious organomegaly appreciated. Bowel sounds are present.  : No CVA tenderness.   MSK: No gross abnormalities appreciated. No limitations to AROM/PROM appreciated.   Extremities: No cyanosis, edema, or clubbing evident. Neurovascularly intact.   Neuro: A&Ox3. CN 2-12 grossly intact. Able to respond to questions appropriately and clearly. No acute focal neurologic deficits appreciated.  Psych: Appropriate mood and behavior.    ASSESSMENT AND PLAN   Assessment/Plan     Bowel perforation with peritonitis  S/P Ex-Lap 7/20  70 y.o. female with past medical history  significant for metastatic small cell lung cancer on chemotherapy, COPD, and recent PEG tube removal (7/7) presents with 4 days of constipation and worsening generalized abdominal pain. She denied nausea, vomiting, fever, or chills. Exam initially showed generalized tenderness without guarding; however, CT abdomen/pelvis revealed free intra-peritoneal air, fecal spillage, and peritonitis consistent with bowel perforation, likely colonic. Labs notable for leukopenia (WBC 2.7), anemia (Hgb 10.4), thrombocytopenia (Plt 146), elevated lactate (2.6-3.0), and bandemia (bands 18%). She was started on IV Zosyn in ED. Surgery was consulted and will proceed with emergent exploratory laparotomy. Medicine was asked to admit for perioperative co-management.  -Managed by primary team  -IV Zosyn continued  -Follow cultures  -Pain control  -Close perioperative support and hemodynamic monitoring  -Intensivist consulted by primary team    Metastatic small cell lung cancer on chemo  Immunosuppressed, cachectic. Risk for poor healing, neutropenic complications  -Oncology follow up on DC    Hypothyroidism  -On levothyroxine, continue home dose    COPD  -Monitor respiratory status perioperatively  -Nebs PRN    Neuropathy  -Home dose of pregabalin      Diet:    DVT Prophylaxis: SCDs plus Lovenox or heparin if not contraindicated   Code Status: Full Code   Case Discussed With: ED provider    Anticipated Length of Stay (LOS): Patient will likely require a stay greater than 2 midnights.     DO Jared Schulte dictation software was used to dictate this note and thus there may be minor errors in translation/transcription including garbled speech or misspellings. Please contact for clarification if needed.       [1]   Family History  Problem Relation Name Age of Onset    Liver cancer Mother     [2] lactated Ringer's, 500 mL, intravenous, Once  levothyroxine, 88 mcg, oral, Daily  piperacillin-tazobactam, 3.375 g, intravenous,  q6h  pregabalin, 75 mg, oral, BID  vasopressin, , ,     [3] lactated Ringer's, 125 mL/hr, Last Rate: 125 mL/hr (07/20/25 0326)  norepinephrine, 0.01-1 mcg/kg/min, Last Rate: 0.37 mcg/kg/min (07/20/25 0347)  vasopressin, 0.03 Units/min    [4] PRN medications: HYDROcodone-acetaminophen, HYDROmorphone, ketorolac, oxygen, vasopressin

## 2025-07-20 NOTE — H&P
"History Of Present Illness  Jaja Bradshaw is a 70 y.o. female presenting with worsening Abdominal pain for several weeks, following having a PEG tube placed, which was subsequently removed on 7/7/25.  CT scan done earlier this evening shows free intra-peritoneal air and possible fecal soilage within the abdomen and pelvis.  She is currently undergoing Chemotherapy for metastatic but resected lung cancer.     Past Medical History  Medical History[1]    Surgical History  Surgical History[2]     Social History  She reports that she has been smoking cigarettes. She started smoking about 45 years ago. She has a 22.8 pack-year smoking history. She has been exposed to tobacco smoke. She has never used smokeless tobacco. She reports current alcohol use of about 8.0 standard drinks of alcohol per week. She reports that she does not use drugs.    Family History  Family History[3]     Allergies  Latex and Codeine         Physical Exam  GA: WD, poorly nourished and cachetic WF, in moderate distress complaining of abdominal pain  HEENT: NC/AT, EOMI, no scleral icterus  NECK: Supple and without adenopathy  LUNGS: Breathing and speaking comfortably  ABDOMEN: Diffusely tender with guarding in the RLQ  EXTREMITIES: No deformities or edema  NEURO: GCS-15, no obvious deficits  PSYCH: Impaired recent and remote memory, normal mood and affect     Last Recorded Vitals  Blood pressure 87/57, pulse 100, temperature 37.7 °C (99.9 °F), temperature source Temporal, resp. rate 20, height 1.626 m (5' 4\"), weight (!) 38.6 kg (85 lb), SpO2 100%.    Relevant Results  Results for orders placed or performed during the hospital encounter of 07/19/25 (from the past 24 hours)   CBC and Auto Differential   Result Value Ref Range    WBC 2.7 (L) 4.4 - 11.3 x10*3/uL    nRBC 0.0 0.0 - 0.0 /100 WBCs    RBC 3.08 (L) 4.00 - 5.20 x10*6/uL    Hemoglobin 10.4 (L) 12.0 - 16.0 g/dL    Hematocrit 31.3 (L) 36.0 - 46.0 %     (H) 80 - 100 fL    MCH 33.8 26.0 " - 34.0 pg    MCHC 33.2 32.0 - 36.0 g/dL    RDW 12.4 11.5 - 14.5 %    Platelets 146 (L) 150 - 450 x10*3/uL    Immature Granulocytes %, Automated 0.7 0.0 - 0.9 %    Immature Granulocytes Absolute, Automated 0.02 0.00 - 0.70 x10*3/uL   Comprehensive metabolic panel   Result Value Ref Range    Glucose 177 (H) 74 - 99 mg/dL    Sodium 136 136 - 145 mmol/L    Potassium 4.1 3.5 - 5.3 mmol/L    Chloride 101 98 - 107 mmol/L    Bicarbonate 27 21 - 32 mmol/L    Anion Gap 12 10 - 20 mmol/L    Urea Nitrogen 16 6 - 23 mg/dL    Creatinine 0.66 0.50 - 1.05 mg/dL    eGFR >90 >60 mL/min/1.73m*2    Calcium 9.6 8.6 - 10.3 mg/dL    Albumin 3.4 3.4 - 5.0 g/dL    Alkaline Phosphatase 116 33 - 136 U/L    Total Protein 5.7 (L) 6.4 - 8.2 g/dL    AST 21 9 - 39 U/L    Bilirubin, Total 0.5 0.0 - 1.2 mg/dL    ALT 17 7 - 45 U/L   Lipase   Result Value Ref Range    Lipase 4 (L) 9 - 82 U/L   Manual Differential   Result Value Ref Range    Neutrophils %, Manual 45.0 40.0 - 80.0 %    Bands %, Manual 18.0 0.0 - 5.0 %    Lymphocytes %, Manual 21.0 13.0 - 44.0 %    Monocytes %, Manual 13.0 2.0 - 10.0 %    Eosinophils %, Manual 1.0 0.0 - 6.0 %    Basophils %, Manual 0.0 0.0 - 2.0 %    Metamyelocytes %, Manual 2.0 0.0 - 0.0 %    Seg Neutrophils Absolute, Manual 1.22 1.20 - 7.00 x10*3/uL    Bands Absolute, Manual 0.49 0.00 - 0.70 x10*3/uL    Lymphocytes Absolute, Manual 0.57 (L) 1.20 - 4.80 x10*3/uL    Monocytes Absolute, Manual 0.35 0.10 - 1.00 x10*3/uL    Eosinophils Absolute, Manual 0.03 0.00 - 0.70 x10*3/uL    Basophils Absolute, Manual 0.00 0.00 - 0.10 x10*3/uL    Metamyelocytes Absolute, Manual 0.05 0.00 - 0.00 x10*3/uL    Total Cells Counted 100     Neutrophils Absolute, Manual 1.71 1.20 - 7.70 x10*3/uL    RBC Morphology See Below     Toxic Granulation Present    Lactate   Result Value Ref Range    Lactate 2.6 (H) 0.4 - 2.0 mmol/L   Lactate   Result Value Ref Range    Lactate 3.0 (H) 0.4 - 2.0 mmol/L              CT abdomen pelvis w IV  contrast  Result Date: 7/19/2025  1. Constellation of the imaging findings concerning for a perforated viscus, with intra-abdominal free air, spillage of stool into the lower pelvic cavity, and associated peritonitis. The exact source is not identified, although there is some stool pooling near the cecum in the appendiceal base and near the sigmoid colon. The appendix is thought to be visualized in the right lower quadrant and appears normal, although the exam is very limited due to surrounding free fluid and peritonitis. Surgical consultation is recommended. 2. Moderate volume of fluid is present throughout the abdomen, suspicious for evolving peritonitis. 3. Rectovaginal prolapse. 4. Lower esophagus is distended with liquid contents, correlate with any esophageal dysmotility. 5. Large volume of stool is present throughout the colon.   MACRO: Sara George discussed the significance and urgency of this critical finding by EPIC secure chat with  KRISTINA LO on 7/19/2025 at 8:33 pm.  (**-RCF-**) Findings:  See findings.   Signed by: Sara George 7/19/2025 8:34 PM Dictation workstation:   [id number]            Assessment & Plan  Bowel perforation (Multi)      Suspect bowel perforation, likely colonic, with fecal soilage.  Recommended Exp. Lap. And patient and family agree to proceed.  The risks of surgery as well as the potential post-op complications, including death, were discussed with the patient and her family and informed consent was obtained.        Mark Suresh MD         [1]   Past Medical History:  Diagnosis Date    COPD (chronic obstructive pulmonary disease) (Multi)     Disease of thyroid gland     PONV (postoperative nausea and vomiting)    [2]   Past Surgical History:  Procedure Laterality Date    ESOPHAGOGASTRODUODENOSCOPY W/ PEG N/A 06/06/2025    MEDIASTINOTOMY N/A 01/30/2025    CERVICAL MEDIASTINOSCOPY    OTHER SURGICAL HISTORY  04/17/2020    Tubal ligation   [3]   Family  History  Problem Relation Name Age of Onset    Liver cancer Mother

## 2025-07-20 NOTE — OP NOTE
LAPAROTOMY, EXPLORATORY, partial sigmoid colon resection, appendectomy, colostomy, EXCISION, LARGE INTESTINE Operative Note     Date: 2025 - 2025  OR Location: [facility]    Name: Jaja Bradshaw, : 1955, Age: 70 y.o., MRN: [id number], Sex: female    Diagnosis  Pre-op Diagnosis      * Bowel perforation (Multi) [K63.1] Post-op Diagnosis:  Perforated Sigmoid Diverticulitis with extensive fecal peritonitis and soilage     Procedures  LAPAROTOMY, EXPLORATORY, partial sigmoid colon resection, appendectomy, colostomy  99782 - CT EXPLORATORY LAPAROTOMY CELIOTOMY W/WO BIOPSY SPX    EXCISION, LARGE INTESTINE  83802 - CT COLECTOMY PRTL W/COLOST/ILEOST & MUCOFISTULA      Surgeons      * Mark Suresh - Primary    Resident/Fellow/Other Assistant:  Surgeons and Role:  * No surgeons found with a matching role *    Staff:   Brooksulator: Feli  Surgical Assistant: Aurora Harrell Person: Annmarie    Anesthesia Staff: CRNA: SARAH Infante-MARTA    Procedure Summary  Anesthesia: General  ASA: IV  Estimated Blood Loss: <100 mL  Intra-op Medications:   Administrations occurring from 25 2300 to 25 0335:   Medication Name Total Dose   bacitracin ointment 1 Application   sodium chloride 0.9 % irrigation solution 14,000 mL   lactated Ringer's infusion 18.75 mL   norepinephrine (Levophed) 8 mg in dextrose 5% 250 mL (0.032 mg/mL) infusion (premix) 0.39 mg   lactated Ringer's bolus 500 mL Cannot be calculated   sodium chloride 0.9 % bolus 500 mL Cannot be calculated   albumin human 25 % 330 mL   fentaNYL PF 0.05 mg/mL 75 mcg   LR bolus Cannot be calculated   LR bolus Cannot be calculated   lidocaine (Xylocaine) injection 2 % 100 mg   metroNIDAZOLE (Flagyl)  mg/100 mL (premix) 500 mg   midazolam (Versed) 1 mg/1 mL 2 mg   norepinephrine (Levophed) 8 mg / 250 mL NS  (premix) 0.45 mg   phenylephrine (Michael-Synephrine) 10 mg/250 mL NS (40 mcg/mL) infusion 2.52 mg   phenylephrine 100 mcg/mL syringe 10 mL (prefilled) 400 mcg    propofol (Diprivan) injection 10 mg/mL 40 mg   rocuronium (ZeMuron) 50 mg/5 mL injection 50 mg   sugammadex (Bridion) 200 mg/2 mL injection 200 mg              Anesthesia Record               Intraprocedure I/O Totals          Intake    LR bolus 3500.00 mL    Norepinephrine Drip 0.00 mL    The total shown is the total volume documented since Anesthesia Start was filed.    Phenylephrine Drip 250.00 mL    The total shown is the total volume documented since Anesthesia Start was filed.    albumin human 25 % 300.00 mL    metroNIDAZOLE (Flagyl)  mg/100 mL (premix) 100.00 mL    Total Intake 4150 mL       Output    Urine 225 mL    Est. Blood Loss 100 mL    NG/OG Tube Output 150 mL    Total Output 475 mL       Net    Net Volume 3675 mL          Specimen:   ID Type Source Tests Collected by Time   1 : partial sigmoid colon Tissue COLON - SIGMOID RESECTION SURGICAL PATHOLOGY EXAM Mark Suresh MD 7/20/2025 0100   2 : appendix Tissue APPENDIX SURGICAL PATHOLOGY EXAM Mark Suresh MD 7/20/2025 0107                 Drains and/or Catheters:   Colostomy Descending/sigmoid LLQ (Active)   Stomal Appliance 1 piece 07/20/25 0300   Site/Stoma Assessment Clean;Intact 07/20/25 0939   Peristomal Assessment TROY 07/20/25 0939   Drainage Characteristics Bloody 07/20/25 1545   Output (mL) 550 mL 07/20/25 1545       Urethral Catheter Non-latex;Straight-tip 16 Fr. (Active)   Site Assessment Clean;Skin intact 07/20/25 1654   Collection Container Standard drainage bag 07/20/25 0300   Securement Method Securing device (Describe) 07/20/25 0300   Reason for Continuing Urinary Catheterization surgical procedures: urological/gynecological, pelvic oncology, anal, prolonged surgical procedure 07/20/25 0300   Output (mL) 35 mL 07/20/25 1506       [REMOVED] NG/OG/Feeding Tube Nasogastric 18 Fr Left nostril (Removed)           Findings: Upon entering the abdomen there was an extensive mixture of solid and liquid fecal soilage throughout the  abdomen, coating most peritoneal surfaces.  A perforation was identified on the anterolateral aspect of the distal Sigmoid colon involving a moderate sized Diverticulum.  Thre was no evidence within the abdomen for metastatic disease related to the patient's known lung cancer.    Indications: Jaja Bradshaw is an 70 y.o. female who is having surgery for Bowel perforation (Multi) [K63.1].     The patient was seen in the preoperative area. The risks, benefits, complications, treatment options, non-operative alternatives, expected recovery and outcomes were discussed with the patient. The possibilities of reaction to medication, pulmonary aspiration, injury to surrounding structures, bleeding, recurrent infection, the need for additional procedures, failure to diagnose a condition, and creating a complication requiring transfusion or operation were discussed with the patient. The patient concurred with the proposed plan, giving informed consent.  The site of surgery was properly noted/marked if necessary per policy. The patient has been actively warmed in preoperative area. Preoperative antibiotics have been ordered and given within 1 hours of incision. Venous thrombosis prophylaxis have been ordered including bilateral sequential compression devices    Procedure Details: The patient was placed on the operating room table in the supine position.  After the appropriate sign in the patient was placed under general endotracheal anesthesia and the a Lock catheter was inserted.  The abdomen was then prepped and draped in sterile fashion.  Following the appropriate timeout the abdomen was then entered through a generous midline incision.  Upon entering the abdomen there was extensive mixture of liquid and solid fecal soilage throughout the abdomen coating most peritoneal surfaces.  The site of perforation was not immediately noted.  The right colon was inspected beginning at the cecum which other than being coated with  the fecal material appeared to be normal the appendix was visualized and also appeared to be normal.  The colon was then traced along its course carefully inspecting the transverse colon as well all of which appeared to be normal.  The splenic flexure and left colon were also visualized with no obvious abnormalities.  The sigmoid colon demonstrated diverticulosis and as we continued to inspect the colon more distally there was a obvious perforation noted on the anterior lateral surface of the distal sigmoid colon near the rectosigmoid junction with some active stool spillage noted.  The small bowel was displaced to facilitate exposure and a window was created in the mesial colon just distal to the site of perforation through which a TA 90 stapler was then passed.  The stapler was then used to close the distal portion of the colon beyond the perforation and a Jose bowel clamp was placed proximal to the line of transection on the bowel distal to the perforation.  A scalpel was then used to transect the colon at this level and the stapler was removed.  The LigaSure was then used to take down the mesocolon for several centimeters proximal to this area.  A ANISH 75 stapler was then used to transect the colon the sigmoid colon completely excising the perforated portion of the specimen the specimen.  At that point the small bowel was then run from the fold of Treves more proximally up to the ligament of Treitz with no significant abnormalities noted.  The stomach and liver were inspected appeared to be normal.  There was no evidence for metastatic disease on any of the peritoneal surfaces or in the liver.  Debridement of is much solid fecal material as was seen as well as the liquid feces was performed followed by 10 L of warm saline irrigation to washout the abdomen.  An aperture was then created in the left lower quadrant of the anterior abdominal wall through the rectus sheath.  The proximal cut end of the colon was  then delivered through this aperture to create a stoma.  After ensuring excellent hemostasis throughout the abdomen and pelvis to 0 Prolene sutures were placed on the distal colonic stump at the rectosigmoid junction.  The abdomen was then closed in layers using interrupted #1 Vicryl sutures for the fascia.  The subcutaneous space was then copiously irrigated with a liter of warm saline and after ensuring good hemostasis the skin was closed with staples.  Of note all operative personnel changed gloves prior to the closure.  The colostomy was then matured by excising the staple line on the cut end of the colon and the stoma was created by using interrupted 3-0 Vicryl sutures to approximate the mucosa full-thickness bowel wall to the edges of the skin and dermis.  Following which his stoma appliance was then applied the patient was noted to be mildly hypotensive during the procedure requiring low-dose Levophed per anesthesia.  There was no significant blood loss which was noted to be less than 100 cc total.  The patient tolerated the procedure well and was taken to the ICU in critical condition.  All tape needle sponge and instrument counts were correct.  She was extubated prior to leaving the OR.  This is Dr. Suresh dictating the operative report for Jaja Bradshaw.        Evidence of Infection: Yes; Fecal spillage WITH feculent peritonitis  Complications:  None; patient tolerated the procedure well.    Disposition: ICU in critical condition  Condition: unstable                 Attending Attestation: I performed the procedure.    Mark Suresh  Phone Number: [phone]

## 2025-07-20 NOTE — ANESTHESIA PROCEDURE NOTES
Arterial Line:    Date/Time: 7/20/2025 12:20 AM    Staffing  Performed: CRNA and attending   Authorized by: JULIAN Infante    Performed by: JULIAN Infante    An arterial line was placed. Procedure performed using surface landmarks.in the OR for the following indication(s): continuous blood pressure monitoring and blood sampling needed.    A 20 gauge (size), 1 and 3/4 inch (length), Angiocath (type) catheter was placed into the Left radial artery, secured by TegadermEvents:  patient tolerated procedure well with no complications.      Additional notes:  Inserted without difficulty in left radial artery. Good pulsatile blood return and flushes easily. Correlates with cuff pressures

## 2025-07-20 NOTE — PROCEDURES
Endotracheal Intubation Procedure Note    Indication for endotracheal intubation: respiratory failure.  Sedation: etomidate, fentanyl, and midazolam.  Paralytic: none.    Patient with multisystem organ failure including metabolic acidosis, worsening hypercarbia and tachypnea.  Discussed placement of endotracheal tube with patient's , Gurjit.  Consent was obtained and procedure encouraged by family.  20 mg of etomidate, 2 mg of midazolam, and 50 mcg of fentanyl were given.  Patient was placed in the supine position.  Oxygenation was excellent.  A Ruben airway was placed in the mouth but the tube was not particularly well-lubricated so I removed the Ruben airway and placed a Mac blade in the patient's mouth obtaining a grade 1 view of the laryngeal structures.  I was able to pass a 7.0 ET tube easily through the vocal cords on first attempt with the Mac blade.  Tube held at 21 cm at the gumline and secured by the respiratory therapist.  There was tube fogging, colorimetry, end-tidal CO2 of 41, and bilateral breath sounds.  Patient was connected to the mechanical ventilator.  Chest x-ray is currently pending but no complications were evident at the time the procedure was terminated.    Donovan Sage MD

## 2025-07-20 NOTE — ANESTHESIA PROCEDURE NOTES
Airway  Date/Time: 7/20/2025 12:02 AM  Reason: elective    Airway not difficult    Staffing  Performed: CRNA   Authorized by: SARAH Infante-CRNA    Performed by: SARAH Infante-MARTA  Patient location during procedure: OR    Patient Condition  Indications for airway management: anesthesia  Patient position: sniffing  No planned trial extubation  Sedation level: deep     Final Airway Details   Preoxygenated: yes  Final airway type: endotracheal airway  Successful airway: ETT  Cuffed: yes   Successful intubation technique: video laryngoscopy  Adjuncts used in placement: intubating stylet  Blade: Sterling  Blade size: #3  ETT size (mm): 7.0  Cormack-Lehane Classification: grade I - full view of glottis  Placement verified by: chest auscultation and capnometry   Cuff volume (mL): 8  Measured from: teeth  ETT to teeth (cm): 20  Number of attempts at approach: 1  Number of other approaches attempted: 0    Additional Comments  Intubation without difficulty. Briscoe videoscope uti;wyatted

## 2025-07-20 NOTE — PROGRESS NOTES
Critical Care Daily Progress        Subjective   Patient is a 70 y.o. female admitted on 7/19/2025  6:15 PM .  Patient seen multiple times through the day today.  Mostly lucid, occasionally mumbling things that do not seem to make a lot of sense.  More recently more lethargic.  Was complaining that pain control was a bit suboptimal.    Interval History: Needed to be endotracheally intubated due to worsening mental status with worsening acidosis and CO2 retention.  Remains critically ill with multisystem organ failure after surgery for fecal peritonitis.  Case personally discussed with Dr. Suresh via telephone.    Scheduled Medications:   Scheduled Medications[1]     Continuous Medications:   Continuous Medications[2]     PRN Medications:   PRN Medications[3]    Objective   Vitals:  Most Recent:  Vitals:    07/20/25 1415   BP:    Pulse: 80   Resp: (!) 30   Temp:    SpO2: 100%       24hr Min/Max:  Temp  Min: 35.8 °C (96.4 °F)  Max: 37.7 °C (99.9 °F)  Pulse  Min: 80  Max: 117  BP  Min: 71/37  Max: 146/72  Resp  Min: 7  Max: 58  SpO2  Min: 60 %  Max: 100 %        I/O:    Intake/Output Summary (Last 24 hours) at 7/20/2025 1427  Last data filed at 7/20/2025 1415  Gross per 24 hour   Intake 7370.83 ml   Output 955 ml   Net 6415.83 ml       Physical Exam:   Physical Exam  Vitals reviewed.   Constitutional:       Appearance: She is ill-appearing.   HENT:      Head: Normocephalic and atraumatic.      Mouth/Throat:      Mouth: Mucous membranes are moist.     Eyes:      General: No scleral icterus.    Neck:      Comments: Right IJ CVL in place  Cardiovascular:      Rate and Rhythm: Normal rate and regular rhythm.      Heart sounds: No murmur heard.  Pulmonary:      Effort: Respiratory distress (Tachypneic prior to intubation) present.      Breath sounds: Normal breath sounds. No wheezing.   Abdominal:      Palpations: Abdomen is soft.      Comments: Scaphoid abdomen with surgical dressing which was not taken down due to the  recent surgery.  Ostomy.     Musculoskeletal:      Cervical back: Neck supple. No rigidity.      Right lower leg: No edema.      Left lower leg: No edema.     Skin:     Coloration: Skin is pale.      Findings: No rash.     Neurological:      Comments: Moves all extremities spontaneously        Lab/Radiology/Diagnostic Review:  Results for orders placed or performed during the hospital encounter of 07/19/25 (from the past 24 hours)   CBC and Auto Differential   Result Value Ref Range    WBC 2.7 (L) 4.4 - 11.3 x10*3/uL    nRBC 0.0 0.0 - 0.0 /100 WBCs    RBC 3.08 (L) 4.00 - 5.20 x10*6/uL    Hemoglobin 10.4 (L) 12.0 - 16.0 g/dL    Hematocrit 31.3 (L) 36.0 - 46.0 %     (H) 80 - 100 fL    MCH 33.8 26.0 - 34.0 pg    MCHC 33.2 32.0 - 36.0 g/dL    RDW 12.4 11.5 - 14.5 %    Platelets 146 (L) 150 - 450 x10*3/uL    Immature Granulocytes %, Automated 0.7 0.0 - 0.9 %    Immature Granulocytes Absolute, Automated 0.02 0.00 - 0.70 x10*3/uL   Comprehensive metabolic panel   Result Value Ref Range    Glucose 177 (H) 74 - 99 mg/dL    Sodium 136 136 - 145 mmol/L    Potassium 4.1 3.5 - 5.3 mmol/L    Chloride 101 98 - 107 mmol/L    Bicarbonate 27 21 - 32 mmol/L    Anion Gap 12 10 - 20 mmol/L    Urea Nitrogen 16 6 - 23 mg/dL    Creatinine 0.66 0.50 - 1.05 mg/dL    eGFR >90 >60 mL/min/1.73m*2    Calcium 9.6 8.6 - 10.3 mg/dL    Albumin 3.4 3.4 - 5.0 g/dL    Alkaline Phosphatase 116 33 - 136 U/L    Total Protein 5.7 (L) 6.4 - 8.2 g/dL    AST 21 9 - 39 U/L    Bilirubin, Total 0.5 0.0 - 1.2 mg/dL    ALT 17 7 - 45 U/L   Lipase   Result Value Ref Range    Lipase 4 (L) 9 - 82 U/L   Manual Differential   Result Value Ref Range    Neutrophils %, Manual 45.0 40.0 - 80.0 %    Bands %, Manual 18.0 0.0 - 5.0 %    Lymphocytes %, Manual 21.0 13.0 - 44.0 %    Monocytes %, Manual 13.0 2.0 - 10.0 %    Eosinophils %, Manual 1.0 0.0 - 6.0 %    Basophils %, Manual 0.0 0.0 - 2.0 %    Metamyelocytes %, Manual 2.0 0.0 - 0.0 %    Seg Neutrophils Absolute,  Manual 1.22 1.20 - 7.00 x10*3/uL    Bands Absolute, Manual 0.49 0.00 - 0.70 x10*3/uL    Lymphocytes Absolute, Manual 0.57 (L) 1.20 - 4.80 x10*3/uL    Monocytes Absolute, Manual 0.35 0.10 - 1.00 x10*3/uL    Eosinophils Absolute, Manual 0.03 0.00 - 0.70 x10*3/uL    Basophils Absolute, Manual 0.00 0.00 - 0.10 x10*3/uL    Metamyelocytes Absolute, Manual 0.05 0.00 - 0.00 x10*3/uL    Total Cells Counted 100     Neutrophils Absolute, Manual 1.71 1.20 - 7.70 x10*3/uL    RBC Morphology See Below     Toxic Granulation Present    Lactate   Result Value Ref Range    Lactate 2.6 (H) 0.4 - 2.0 mmol/L   Blood Culture    Specimen: Peripheral Venipuncture; Blood culture   Result Value Ref Range    Blood Culture Loaded on Instrument - Culture in progress    Blood Culture    Specimen: Peripheral Venipuncture; Blood culture   Result Value Ref Range    Blood Culture Loaded on Instrument - Culture in progress    Lactate   Result Value Ref Range    Lactate 3.0 (H) 0.4 - 2.0 mmol/L   Tissue/Wound Culture/Smear    Specimen: Wound/Tissue; Tissue/Biopsy   Result Value Ref Range    Gram Stain (1+) Rare Polymorphonuclear leukocytes (A)     Gram Stain (A)      (4+) Abundant Mixed Gram positive and Gram negative bacteria   Cortisol   Result Value Ref Range    Cortisol 81.5 (H) 2.5 - 20.0 ug/dL   Haptoglobin   Result Value Ref Range    Haptoglobin 114 30 - 200 mg/dL   Comprehensive metabolic panel   Result Value Ref Range    Glucose 176 (H) 74 - 99 mg/dL    Sodium 139 136 - 145 mmol/L    Potassium 3.9 3.5 - 5.3 mmol/L    Chloride 109 (H) 98 - 107 mmol/L    Bicarbonate 23 21 - 32 mmol/L    Anion Gap 11 10 - 20 mmol/L    Urea Nitrogen 18 6 - 23 mg/dL    Creatinine 0.60 0.50 - 1.05 mg/dL    eGFR >90 >60 mL/min/1.73m*2    Calcium 7.4 (L) 8.6 - 10.3 mg/dL    Albumin 2.4 (L) 3.4 - 5.0 g/dL    Alkaline Phosphatase 54 33 - 136 U/L    Total Protein 3.3 (L) 6.4 - 8.2 g/dL    AST 12 9 - 39 U/L    Bilirubin, Total 0.3 0.0 - 1.2 mg/dL    ALT 8 7 - 45 U/L   CBC    Result Value Ref Range    WBC 5.0 4.4 - 11.3 x10*3/uL    nRBC 0.0 0.0 - 0.0 /100 WBCs    RBC 1.40 (L) 4.00 - 5.20 x10*6/uL    Hemoglobin 4.8 (LL) 12.0 - 16.0 g/dL    Hematocrit 14.5 (L) 36.0 - 46.0 %     (H) 80 - 100 fL    MCH 34.3 (H) 26.0 - 34.0 pg    MCHC 33.1 32.0 - 36.0 g/dL    RDW 12.7 11.5 - 14.5 %    Platelets 84 (L) 150 - 450 x10*3/uL   Magnesium   Result Value Ref Range    Magnesium 2.34 1.60 - 2.40 mg/dL   Phosphorus   Result Value Ref Range    Phosphorus 4.8 2.5 - 4.9 mg/dL   Type and screen   Result Value Ref Range    ABO TYPE O     Rh TYPE POS     ANTIBODY SCREEN POS    BB ORDER ONLY - Antibody Identification   Result Value Ref Range    Antibody ID Anti-E     CASE #     Blood Gas Arterial Full Panel   Result Value Ref Range    POCT pH, Arterial 7.26 (L) 7.38 - 7.42 pH    POCT pCO2, Arterial 49 (H) 38 - 42 mm Hg    POCT pO2, Arterial 62 (L) 85 - 95 mm Hg    POCT SO2, Arterial 93 (L) 94 - 100 %    POCT Oxy Hemoglobin, Arterial 91.0 (L) 94.0 - 98.0 %    POCT Hematocrit Calculated, Arterial 15.0 (L) 36.0 - 46.0 %    POCT Sodium, Arterial 135 (L) 136 - 145 mmol/L    POCT Potassium, Arterial 4.0 3.5 - 5.3 mmol/L    POCT Chloride, Arterial 110 (H) 98 - 107 mmol/L    POCT Ionized Calcium, Arterial 1.21 1.10 - 1.33 mmol/L    POCT Glucose, Arterial 171 (H) 74 - 99 mg/dL    POCT Lactate, Arterial 4.7 (HH) 0.4 - 2.0 mmol/L    POCT Base Excess, Arterial -4.5 (L) -2.0 - 3.0 mmol/L    POCT HCO3 Calculated, Arterial 22.0 22.0 - 26.0 mmol/L    POCT Hemoglobin, Arterial 5.0 (LL) 12.0 - 16.0 g/dL    POCT Anion Gap, Arterial 7 (L) 10 - 25 mmo/L    Patient Temperature 37.0 degrees Celsius    FiO2 21 %   VERIFY ABO/Rh Group Test   Result Value Ref Range    ABO TYPE O     Rh TYPE POS    Prepare RBC: 2 Units, Leukocytes Reduced (CMV reduced risk)   Result Value Ref Range    PRODUCT CODE P8391Z18     Unit Number [id number]     Unit ABO O     Unit RH POS     XM INTEP COMP     Dispense Status IS     Blood  Expiration Date 8/21/2025 11:59:00 PM EDT     PRODUCT BLOOD TYPE 5100     UNIT VOLUME 284     PRODUCT CODE F5545T75     Unit Number [id number]     Unit ABO O     Unit RH POS     XM INTEP COMP     Dispense Status IS     Blood Expiration Date 8/20/2025 11:59:00 PM EDT     PRODUCT BLOOD TYPE 5100     UNIT VOLUME 350    Prepare RBC   Result Value Ref Range    PRODUCT CODE Y9697H84     Unit Number [id number]     Unit ABO O     Unit RH NEG     Dispense Status PI     Blood Expiration Date 8/6/2025 11:59:00 PM EDT     PRODUCT BLOOD TYPE 9500     UNIT VOLUME 286     XM INTEP INCOMP    Fibrinogen   Result Value Ref Range    Fibrinogen 190 (L) 200 - 400 mg/dL   Coagulation Screen   Result Value Ref Range    Protime 20.8 (H) 9.8 - 12.4 seconds    INR 1.9 (H) 0.9 - 1.1    aPTT 52 (H) 26 - 36 seconds   CBC   Result Value Ref Range    WBC 7.2 4.4 - 11.3 x10*3/uL    nRBC 0.3 (H) 0.0 - 0.0 /100 WBCs    RBC 1.26 (L) 4.00 - 5.20 x10*6/uL    Hemoglobin 4.3 (LL) 12.0 - 16.0 g/dL    Hematocrit 14.1 (L) 36.0 - 46.0 %     (H) 80 - 100 fL    MCH 34.1 (H) 26.0 - 34.0 pg    MCHC 30.5 (L) 32.0 - 36.0 g/dL    RDW 12.7 11.5 - 14.5 %    Platelets 79 (L) 150 - 450 x10*3/uL   Prepare Cryoprecipitated AHF (Pooled Units): 1 Pools   Result Value Ref Range    PRODUCT CODE T5079J07     Unit Number [id number]     Unit ABO O     Unit RH POS     Dispense Status IS     Blood Expiration Date 7/20/2025  1:14:00 PM EDT     PRODUCT BLOOD TYPE 5100     UNIT VOLUME 100    Blood Gas Arterial Full Panel   Result Value Ref Range    POCT pH, Arterial 7.01 (LL) 7.38 - 7.42 pH    POCT pCO2, Arterial 53 (H) 38 - 42 mm Hg    POCT pO2, Arterial 50 (L) 85 - 95 mm Hg    POCT SO2, Arterial 83 (L) 94 - 100 %    POCT Oxy Hemoglobin, Arterial 82.6 (L) 94.0 - 98.0 %    POCT Hematocrit Calculated, Arterial 13.0 (L) 36.0 - 46.0 %    POCT Sodium, Arterial 134 (L) 136 - 145 mmol/L    POCT Potassium, Arterial 4.2 3.5 - 5.3 mmol/L    POCT Chloride, Arterial 109  (H) 98 - 107 mmol/L    POCT Ionized Calcium, Arterial 1.14 1.10 - 1.33 mmol/L    POCT Glucose, Arterial 138 (H) 74 - 99 mg/dL    POCT Lactate, Arterial 13.7 (HH) 0.4 - 2.0 mmol/L    POCT Base Excess, Arterial -15.8 (L) -2.0 - 3.0 mmol/L    POCT HCO3 Calculated, Arterial 13.4 (L) 22.0 - 26.0 mmol/L    POCT Hemoglobin, Arterial 4.2 (LL) 12.0 - 16.0 g/dL    POCT Anion Gap, Arterial 16 10 - 25 mmo/L    Patient Temperature 37.0 degrees Celsius    FiO2 44 %   Lactate   Result Value Ref Range    Lactate 13.2 (HH) 0.4 - 2.0 mmol/L   BLOOD GAS ARTERIAL   Result Value Ref Range    POCT pH, Arterial 7.15 (LL) 7.38 - 7.42 pH    POCT pCO2, Arterial 36 (L) 38 - 42 mm Hg    POCT pO2, Arterial 183 (H) 85 - 95 mm Hg    POCT SO2, Arterial 99 94 - 100 %    POCT Oxy Hemoglobin, Arterial 98.1 (H) 94.0 - 98.0 %    POCT Base Excess, Arterial -15.5 (L) -2.0 - 3.0 mmol/L    POCT HCO3 Calculated, Arterial 12.5 (L) 22.0 - 26.0 mmol/L    Patient Temperature 37.0 degrees Celsius    FiO2 44 %   Lactate   Result Value Ref Range    Lactate 10.6 (HH) 0.4 - 2.0 mmol/L   CBC and Auto Differential   Result Value Ref Range    WBC 7.8 4.4 - 11.3 x10*3/uL    nRBC 0.4 (H) 0.0 - 0.0 /100 WBCs    RBC 2.21 (L) 4.00 - 5.20 x10*6/uL    Hemoglobin 6.6 (L) 12.0 - 16.0 g/dL    Hematocrit 21.2 (L) 36.0 - 46.0 %    MCV 96 80 - 100 fL    MCH 29.9 26.0 - 34.0 pg    MCHC 31.1 (L) 32.0 - 36.0 g/dL    RDW 16.9 (H) 11.5 - 14.5 %    Platelets 33 (LL) 150 - 450 x10*3/uL    Immature Granulocytes %, Automated 3.7 (H) 0.0 - 0.9 %    Immature Granulocytes Absolute, Automated 0.29 0.00 - 0.70 x10*3/uL   Protime-INR   Result Value Ref Range    Protime 29.5 (H) 9.8 - 12.4 seconds    INR 2.7 (H) 0.9 - 1.1   Blood Gas Arterial   Result Value Ref Range    POCT pH, Arterial 7.10 (LL) 7.38 - 7.42 pH    POCT pCO2, Arterial 53 (H) 38 - 42 mm Hg    POCT pO2, Arterial 191 (H) 85 - 95 mm Hg    POCT SO2, Arterial 99 94 - 100 %    POCT Oxy Hemoglobin, Arterial 97.3 94.0 - 98.0 %    POCT Base  Excess, Arterial -13.3 (L) -2.0 - 3.0 mmol/L    POCT HCO3 Calculated, Arterial 16.5 (L) 22.0 - 26.0 mmol/L    Patient Temperature 37.0 degrees Celsius    FiO2 44 %   Manual Differential   Result Value Ref Range    Neutrophils %, Manual 52.0 40.0 - 80.0 %    Bands %, Manual 21.0 0.0 - 5.0 %    Lymphocytes %, Manual 13.0 13.0 - 44.0 %    Monocytes %, Manual 9.0 2.0 - 10.0 %    Eosinophils %, Manual 0.0 0.0 - 6.0 %    Basophils %, Manual 0.0 0.0 - 2.0 %    Atypical Lymphocytes %, Manual 1.0 0.0 - 2.0 %    Metamyelocytes %, Manual 4.0 0.0 - 0.0 %    Seg Neutrophils Absolute, Manual 4.06 1.20 - 7.00 x10*3/uL    Bands Absolute, Manual 1.64 (H) 0.00 - 0.70 x10*3/uL    Lymphocytes Absolute, Manual 1.01 (L) 1.20 - 4.80 x10*3/uL    Monocytes Absolute, Manual 0.70 0.10 - 1.00 x10*3/uL    Eosinophils Absolute, Manual 0.00 0.00 - 0.70 x10*3/uL    Basophils Absolute, Manual 0.00 0.00 - 0.10 x10*3/uL    Atypical Lymphs Absolute, Manual 0.08 0.00 - 0.50 x10*3/uL    Metamyelocytes Absolute, Manual 0.31 0.00 - 0.00 x10*3/uL    Total Cells Counted 100     Neutrophils Absolute, Manual 5.70 1.20 - 7.70 x10*3/uL    RBC Morphology See Below     Rochelle Cells Many     Dohle Bodies Present     Toxic Granulation Present     Vacuolated Neutrophils Present    Prepare Plasma: 1 Units   Result Value Ref Range    PRODUCT CODE Q2311Y14     Unit Number [id number]     Unit ABO O     Unit RH POS     Dispense Status IS     Blood Expiration Date 7/25/2025 11:17:00 AM EDT     PRODUCT BLOOD TYPE 5100     UNIT VOLUME 217    Prepare RBC: 1 Units   Result Value Ref Range    PRODUCT CODE C0445G93     Unit Number [id number]     Unit ABO O     Unit RH POS     XM INTEP COMP     Dispense Status IS     Blood Expiration Date 8/20/2025 11:59:00 PM EDT     PRODUCT BLOOD TYPE 5100     UNIT VOLUME 350    Blood Gas Arterial Full Panel   Result Value Ref Range    POCT pH, Arterial 7.03 (LL) 7.38 - 7.42 pH    POCT pCO2, Arterial 58 (H) 38 - 42 mm Hg    POCT pO2,  Arterial 175 (H) 85 - 95 mm Hg    POCT SO2, Arterial 98 94 - 100 %    POCT Oxy Hemoglobin, Arterial 98.0 94.0 - 98.0 %    POCT Hematocrit Calculated, Arterial 16.0 (L) 36.0 - 46.0 %    POCT Sodium, Arterial 136 136 - 145 mmol/L    POCT Potassium, Arterial 4.8 3.5 - 5.3 mmol/L    POCT Chloride, Arterial 104 98 - 107 mmol/L    POCT Ionized Calcium, Arterial 0.91 (L) 1.10 - 1.33 mmol/L    POCT Glucose, Arterial 157 (H) 74 - 99 mg/dL    POCT Lactate, Arterial 13.2 (HH) 0.4 - 2.0 mmol/L    POCT Base Excess, Arterial -14.1 (L) -2.0 - 3.0 mmol/L    POCT HCO3 Calculated, Arterial 15.3 (L) 22.0 - 26.0 mmol/L    POCT Hemoglobin, Arterial 5.4 (LL) 12.0 - 16.0 g/dL    POCT Anion Gap, Arterial 22 10 - 25 mmo/L    Patient Temperature 37.0 degrees Celsius    FiO2 50 %   BLOOD GAS ARTERIAL   Result Value Ref Range    POCT pH, Arterial 7.11 (LL) 7.38 - 7.42 pH    POCT pCO2, Arterial 42 38 - 42 mm Hg    POCT pO2, Arterial 272 (H) 85 - 95 mm Hg    POCT SO2, Arterial 99 94 - 100 %    POCT Oxy Hemoglobin, Arterial 98.9 (H) 94.0 - 98.0 %    POCT Base Excess, Arterial -15.7 (L) -2.0 - 3.0 mmol/L    POCT HCO3 Calculated, Arterial 13.3 (L) 22.0 - 26.0 mmol/L    Patient Temperature 37.0 degrees Celsius    FiO2 70 %   Blood Gas Lactic Acid, Venous   Result Value Ref Range    POCT Lactate, Venous 13.0 (HH) 0.4 - 2.0 mmol/L     Imaging  XR chest 1 view  Result Date: 7/20/2025  No acute cardiopulmonary disease. Interval line placement. No obvious pneumothorax.   MACRO: none   Signed by: Indira Rebollar 7/20/2025 7:03 AM Dictation workstation:   [id number]    CT abdomen pelvis w IV contrast  Result Date: 7/19/2025  1. Constellation of the imaging findings concerning for a perforated viscus, with intra-abdominal free air, spillage of stool into the lower pelvic cavity, and associated peritonitis. The exact source is not identified, although there is some stool pooling near the cecum in the appendiceal base and near the sigmoid colon. The appendix is  thought to be visualized in the right lower quadrant and appears normal, although the exam is very limited due to surrounding free fluid and peritonitis. Surgical consultation is recommended. 2. Moderate volume of fluid is present throughout the abdomen, suspicious for evolving peritonitis. 3. Rectovaginal prolapse. 4. Lower esophagus is distended with liquid contents, correlate with any esophageal dysmotility. 5. Large volume of stool is present throughout the colon.   MACRO: Sara George discussed the significance and urgency of this critical finding by EPIC secure chat with  KRISTINA LO on 7/19/2025 at 8:33 pm.  (**-RCF-**) Findings:  See findings.   Signed by: Sara George 7/19/2025 8:34 PM Dictation workstation:   [id number]    EMG & nerve conduction  Result Date: 7/17/2025  Impression: This is an abnormal, but somewhat limited study of the right upper and lower extremities with the following findings: There is evidence of a sensory greater than motor predominant, generalized axonal peripheral polyneuropathy.  However, the absent, prolonged, and/or impersistent F-wave latencies may be suggestive of a proximal neurogenic or demyelinating process. The examination was terminated early due to the patient's toleration, and significant pain from the study.  Thus, additional nerve conduction studies and needle examination could not be performed.  Within the limits of this study, there is no definite evidence of median neuropathy at the wrist or ulnar neuropathy at the elbow.  However this study is indeterminant for the evaluation of cervical radiculopathy, lumbosacral radiculopathy, plexopathy, and myopathy. A neuromuscular ultrasound was also performed and will be reported separately. Rene Stacy MD  "-------------------------------------------------------------------------------------------------------------------------------------------------------------------------------------------------------------------------------------------------------------- -------------------------------------------------------------------------------------------------------------------------------------------------------------------------------------------------------------------------------------------------------------- -------------------------------------------------------------------------------------------------------------------------------------------------------------------------------------------------------------------------------------------------------------- -------------------------------------------------------------------------------------------------------------------------------------------------------------------------------------------      Cardiology, Vascular, and Other Imaging  Point of Care Ultrasound  Result Date: 7/17/2025  Rene Stacy MD     7/17/2025  9:46 PM Performed by: Rene Stacy MD Authorized by: Rene Stacy MD               Assessment/Plan   Assessment & Plan  Bowel perforation (Multi)    Septic shock with multisystem organ failure including hypercarbic respiratory failure, acute oliguric renal failure with metabolic acidosis and circulatory collapse/distributive hemodynamics also with an element of volume depletion and third spacing.  Infectious source is feculent peritonitis from perforated bowel.  According to Dr. Suresh she had \"a lot of it.\"  Dr. Suresh also informed us that we can use the GI tract.  Will trickle feed only and can administer her thyroid hormone enterally.  Patient is also profoundly immunocompromised from limited stage small cell lung cancer status post recent chemotherapy and severe protein calorie malnutrition with a BMI less than 16.  Plan includes " mechanical ventilation, volume resuscitation including bicarbonate containing fluids.  Frequent assessment of blood product need with CBC and INR/fibrinogen checks.  Support blood pressure with norepinephrine, vasopressin, and stress dose hydrocortisone.  May eventually need CRRT.  Zosyn pending cultures and ID consultation.  I discussed the fact that if the cardiac arrest were to occur in this clinical setting, CPR would very likely be futile and probably should not be offered with the patient's , Gurjit.  Multiple visits throughout the day today totaling 210 minutes of critical care service time exclusive of procedure time    History of tobacco use with COPD/emphysema.  Not particularly active.  Chest is quiet.  Airway pressures are low without auto PEEP.    History of hypothyroidism    History of positive ABNER    Dragon dictation software was used to dictate this note and thus there may be minor errors in translation/transcription including garbled speech or misspellings. Please contact for clarification if needed.     Donovan Sage MD        [1] fentaNYL, 25 mcg, intravenous, Once  hydrocortisone sodium succinate, 100 mg, intravenous, q8h  lactated Ringer's, 1,000 mL, intravenous, Once  [START ON 7/21/2025] levothyroxine, 88 mcg, orogastric tube, Daily  lidocaine, 0.1 mL, subcutaneous, Once  oxygen, , inhalation, Continuous - Inhalation  oxygen, , inhalation, Continuous - Inhalation  piperacillin-tazobactam, 3.375 g, intravenous, q6h  [Held by provider] pregabalin, 75 mg, oral, BID  senna, 10 mL, orogastric tube, BID  [2] fentaNYL, 0-150 mcg/hr  lactated Ringer's, 125 mL/hr, Last Rate: 125 mL/hr (07/20/25 0326)  norepinephrine, 0.01-1 mcg/kg/min, Last Rate: 0.54 mcg/kg/min (07/20/25 1101)  sodium bicarbonate, 125 mL/hr, Last Rate: 125 mL/hr (07/20/25 0907)  vasopressin, 0.03 Units/min, Last Rate: 0.03 Units/min (07/20/25 1320)  [3] PRN medications: fentaNYL, oxygen

## 2025-07-20 NOTE — ANESTHESIA PREPROCEDURE EVALUATION
Patient: Jaja Bradshaw    Procedure Information       Date/Time: 25 2300    Procedures:       LAPAROTOMY, EXPLORATORY      EXCISION, LARGE INTESTINE    Location: [facility] / [facility]    Surgeons: Mark Suresh MD            Relevant Problems   Anesthesia   (+) PONV (postoperative nausea and vomiting)      Cardiac   (+) Abnormal ECG   (+) Benign essential hypertension   (+) Hyperlipidemia   (+) Hypertension      Pulmonary   (+) Chronic obstructive pulmonary disease (Multi)      GI   (+) Dysphagia      Endocrine   (+) Hypothyroidism      Musculoskeletal   (+) Chronic pain syndrome      ID   (+) Esophageal candidiasis (Multi)       Clinical information reviewed:   Tobacco  Allergies  Meds  Problems  Med Hx  Surg Hx   Fam Hx          NPO Detail:  No data recorded     Physical Exam    Airway  Mallampati: III  TM distance: <3 FB  Neck ROM: limited  Mouth openin finger widths     Cardiovascular - normal exam  Rhythm: regular  Rate: normal     Dental     (+) edentulous     Pulmonary (+) decreased breath sounds     Abdominal Abdomen: rigid  Bowel sounds: decreased           Anesthesia Plan    History of general anesthesia?: yes  History of complications of general anesthesia?: no    ASA 4 - emergent     general     The patient is a current smoker.  Patient was previously instructed to abstain from smoking on day of procedure.  Patient smoked on day of procedure.    intravenous induction   Anesthetic plan and risks discussed with patient.    Plan discussed with CRNA.

## 2025-07-20 NOTE — BRIEF OP NOTE
Date: 2025 - 2025  OR Location: [facility]    Name: Jaja Bradshaw, : 1955, Age: 70 y.o., MRN: [id number], Sex: female    Diagnosis  Pre-op Diagnosis      * Bowel perforation (Multi) [K63.1] Post-op Diagnosis:  Perforated Sigmoid Diverticulitis with extensive fecal peritonitis     Procedures  LAPAROTOMY, EXPLORATORY, partial sigmoid colon resection, appendectomy, colostomy  58494 - PA EXPLORATORY LAPAROTOMY CELIOTOMY W/WO BIOPSY SPX    EXCISION, LARGE INTESTINE  40479 - PA COLECTOMY PRTL W/COLOST/ILEOST & MUCOFISTULA      Surgeons      * Mark Suresh - Primary    Resident/Fellow/Other Assistant:  Surgeons and Role:  * No surgeons found with a matching role *    Staff:   Brooksulator: Feli  Surgical Assistant: Aurora Harrell Person: Annmarie    Anesthesia Staff: CRNA: SARAH Infante-CRNA    Procedure Summary  Anesthesia: General  ASA: IV  Estimated Blood Loss: <100 mL  Intra-op Medications:   Administrations occurring from 25 2300 to 25 0335:   Medication Name Total Dose   bacitracin ointment 1 Application   sodium chloride 0.9 % irrigation solution 14,000 mL   lactated Ringer's infusion 18.75 mL   norepinephrine (Levophed) 8 mg in dextrose 5% 250 mL (0.032 mg/mL) infusion (premix) 0.39 mg   lactated Ringer's bolus 500 mL Cannot be calculated   sodium chloride 0.9 % bolus 500 mL Cannot be calculated   albumin human 25 % 330 mL   fentaNYL PF 0.05 mg/mL 75 mcg   LR bolus Cannot be calculated   LR bolus Cannot be calculated   lidocaine (Xylocaine) injection 2 % 100 mg   metroNIDAZOLE (Flagyl)  mg/100 mL (premix) 500 mg   midazolam (Versed) 1 mg/1 mL 2 mg   norepinephrine (Levophed) 8 mg / 250 mL NS  (premix) 0.45 mg   phenylephrine (Michael-Synephrine) 10 mg/250 mL NS (40 mcg/mL) infusion 2.52 mg   phenylephrine 100 mcg/mL syringe 10 mL (prefilled) 400 mcg   propofol (Diprivan) injection 10 mg/mL 40 mg   rocuronium (ZeMuron) 50 mg/5 mL injection 50 mg   sugammadex (Bridion) 200 mg/2 mL  injection 200 mg              Anesthesia Record               Intraprocedure I/O Totals          Intake    LR bolus 3500.00 mL    Norepinephrine Drip 0.00 mL    The total shown is the total volume documented since Anesthesia Start was filed.    Phenylephrine Drip 250.00 mL    The total shown is the total volume documented since Anesthesia Start was filed.    albumin human 25 % 300.00 mL    metroNIDAZOLE (Flagyl)  mg/100 mL (premix) 100.00 mL    Total Intake 4150 mL       Output    Urine 225 mL    Est. Blood Loss 100 mL    NG/OG Tube Output 150 mL    Total Output 475 mL       Net    Net Volume 3675 mL          Specimen:   ID Type Source Tests Collected by Time   1 : partial sigmoid colon Tissue COLON - SIGMOID RESECTION SURGICAL PATHOLOGY EXAM Mark Suresh MD 7/20/2025 0100   2 : appendix Tissue APPENDIX SURGICAL PATHOLOGY EXAM Mark Suresh MD 7/20/2025 0107                  Findings: Upon entering the abdomen there was extensive mixed liquid and solid fecal soilage noted scattered throughout the abdomen and coating most peritoneal surfaces.  A perforation was found in a diverticulum at the anterolateral edge of the distal sigmoid colon near the rectosigmoid junction.  This segment of the sigmoid colon was resected and an end colostomy was performed.  All visible solid fecal material was debrided and copious irrigation of the abdomen was performed using 10 liters of warm saline.  An Appendectomy was performed as well.  Careful inspection of the entire abdomen revealed no evidence for metastatic disease related to the patient's known lung cancer.  There was minimal blood loss for the entire procedure with excellent hemostasis noted at closure.        Complications:  None; patient tolerated the procedure well.       Disposition: ICU and relatively stable but requiring minimal BP support on Levophed    Condition: Critical    Specimens Collected:   ID Type Source Tests Collected by Time   1 : partial sigmoid  colon Tissue COLON - SIGMOID RESECTION SURGICAL PATHOLOGY EXAM Mark Suresh MD 7/20/2025 0100   2 : appendix Tissue APPENDIX SURGICAL PATHOLOGY EXAM Mark Suresh MD 7/20/2025 0107     Attending Attestation: I performed the procedure.        Mark Suresh  Phone Number: [phone]

## 2025-07-20 NOTE — PROCEDURES
Central Line    Date/Time: 7/20/2025 4:13 AM    Performed by: KAITY Sandoval  Authorized by: KAITY Sandoval    Consent:     Consent obtained:  Verbal    Consent given by:  Patient    Risks, benefits, and alternatives were discussed: yes    Universal protocol:     Patient identity confirmed:  Arm band and verbally with patient  Pre-procedure details:     Indication(s): central venous access and hemodynamic monitoring      Hand hygiene: Hand hygiene performed prior to insertion      Sterile barrier technique: All elements of maximal sterile technique followed      Skin preparation:  Chlorhexidine    Skin preparation agent: Skin preparation agent completely dried prior to procedure    Sedation:     Sedation type:  None  Anesthesia:     Anesthesia method:  Local infiltration    Local anesthetic:  Lidocaine 1% w/o epi  Procedure details:     Location:  R internal jugular    Patient position:  Trendelenburg    Procedural supplies:  Triple lumen    Catheter size:  7 Fr    Catheter length:  16    Landmarks identified: yes      Ultrasound guidance: yes      Ultrasound guidance timing: prior to insertion and real time      Sterile ultrasound techniques: Sterile gel and sterile probe covers were used      Number of attempts:  1    Successful placement: yes    Post-procedure details:     Post-procedure:  Dressing applied and line sutured    Assessment:  Blood return through all ports and placement verified by x-ray    Procedure completion:  Tolerated well, no immediate complications  Comments:      Chest x-ray for placement confirmation pending.

## 2025-07-20 NOTE — ANESTHESIA POSTPROCEDURE EVALUATION
Patient: Jaaj Bradshaw    Procedure Summary       Date: 07/19/25 Room / Location: [facility] 01 / [facility]    Anesthesia Start: 2356 Anesthesia Stop: 07/20/25 0250    Procedures:       LAPAROTOMY, EXPLORATORY, partial sigmoid colon resection, appendectomy, colostomy      EXCISION, LARGE INTESTINE Diagnosis:       Bowel perforation (Multi)      (Bowel perforation (Multi) [K63.1])    Surgeons: Mark Suresh MD Responsible Provider: SARAH Infante-MARTA    Anesthesia Type: general ASA Status: 4 - Emergent            Anesthesia Type: general    Vitals Value Taken Time   BP 91/57 07/20/25 02:50   Temp 36.6 °C (97.9 °F) 07/20/25 02:50   Pulse 97 07/20/25 02:59   Resp 39 07/20/25 02:59   SpO2 100 % 07/20/25 02:59   Vitals shown include unfiled device data.    Anesthesia Post Evaluation    Patient location during evaluation: ICU  Patient participation: complete - patient participated  Level of consciousness: awake  Pain score: 3  Pain management: adequate  Airway patency: patent  Cardiovascular status: acceptable  Respiratory status: acceptable  Hydration status: acceptable  Postoperative Nausea and Vomiting: none        There were no known notable events for this encounter.

## 2025-07-20 NOTE — PROGRESS NOTES
Jaja Bradshaw is a 70 y.o. female on day 0 of admission presenting with Bowel perforation (Multi).      Subjective   Patient is lying on the bed, intubated sedated.  No signs of distress noticed.    Objective     Last Recorded Vitals  /65   Pulse 87   Temp 36.2 °C (97.2 °F) (Temporal)   Resp (!) 30   Wt (!) 41.8 kg (92 lb 2.4 oz)   SpO2 100%   Intake/Output last 3 Shifts:    Intake/Output Summary (Last 24 hours) at 7/20/2025 1355  Last data filed at 7/20/2025 1100  Gross per 24 hour   Intake 7370.83 ml   Output 945 ml   Net 6425.83 ml       Admission Weight  Weight: (!) 38.6 kg (85 lb) (07/19/25 1823)    Daily Weight  07/20/25 : (!) 41.8 kg (92 lb 2.4 oz)    Image Results  XR chest 1 view  Narrative: Interpreted By:  Indira Rebollar,   STUDY:  XR CHEST 1 VIEW;  7/20/2025 4:27 am      INDICATION:  Signs/Symptoms:right  IJ CVC placement.      COMPARISON:  06/17/2025      ACCESSION NUMBER(S):  [id number]      ORDERING CLINICIAN:  DALJIT MAHMOOD      FINDINGS:  The heart is normal in size. There appear to be atherosclerotic  changes of the aorta.      There is no discrete consolidation or pleural fluid. There appears to  be coarsening of the basilar lung markings.      A venous catheter is present on the right with the tip in the region  of the superior vena cava.      There are degenerative changes of the spine.      COMPARISON OF FINDING:  The chest is similar.      Impression: No acute cardiopulmonary disease. Interval line placement. No obvious  pneumothorax.      MACRO:  none      Signed by: Indira Rebollar 7/20/2025 7:03 AM  Dictation workstation:   [id number]      Physical Exam  General: Sedated  HEENT-intubated  Heart: S1-S2 heard no murmurs gallops regurgitation.  Lungs: Diminished breath sounds at the bases  Abdomen: Nondistended, bowel sounds are sluggish, colostomy  Musculoskeletal: No deformities.  Extremities: No edema pulses are equal bilaterally.  Neuro: Sedated.    Relevant Results  Lab  Results   Component Value Date    WBC 7.8 07/20/2025    HGB 6.6 (L) 07/20/2025    HCT 21.2 (L) 07/20/2025    MCV 96 07/20/2025    PLT 33 (LL) 07/20/2025     Lab Results   Component Value Date    GLUCOSE 176 (H) 07/20/2025    CALCIUM 7.4 (L) 07/20/2025     07/20/2025    K 3.9 07/20/2025    CO2 23 07/20/2025     (H) 07/20/2025    BUN 18 07/20/2025    CREATININE 0.60 07/20/2025     Assessment:  Acute hypoxic respiratory failure-intubated on 07/20  Septic shock.  Acute peritonitis secondary to bowel perforation.  Status post exploratory laparotomy, washout, partial sigmoid colon resection, appendectomy and colostomy on 7/20.  Blood loss anemia-hemoglobin improved from 4.3-6.6  Severe protein calorie malnutrition.    Plan:  Patient underwent exploratory laparotomy with partial sigmoid colon resection appendectomy and colostomy on 7/20.  Septic shock secondary to peritonitis, currently patient is on 2 pressors maxed out.  Continue broad-spectrum antibiotics with Zosyn-pending cultures, will follow-up on ID recommendations.  S/p intubation on 7/20, Vent management and sedation protocol as per critical care.  Continuing blood transfusions, last hemoglobin 6.6.  Tolerating tube feeds  Follow-up CBC BMP ordered.    DVT prophylaxis:  Lovenox daily.    Prognosis-guarded.      Ricardo Salinas MD

## 2025-07-20 NOTE — CONSULTS
Inpatient consult to Infectious Diseases  Consult performed by: Ck PETERS MD  Consult ordered by: Aurora Herrera, APRN-CNP  Reason for consult: bowel perforation      History Of Present Illness  Jaja Bradshaw is a 70 y.o. female presenting with obesity, small cell CA, copd coming in with abd pain, constipation and no bm for 4 days. Ct a/p showed Large volume of stool is present throughout the colon, with some spillage of stool in the lower right pelvis on my rvw of imaging. Pt had ex-lap washout and WA COLECTOMY PRTL W/COLOST/ILEOST & MUCOFISTULA today and admitted to the icu intubated and on pressors. ID consulted for abx recs.   Hx taken from chart and from .     Past Medical History  She has a past medical history of COPD (chronic obstructive pulmonary disease) (Multi), Disease of thyroid gland, and PONV (postoperative nausea and vomiting).    Surgical History  She has a past surgical history that includes Other surgical history (04/17/2020); Mediastinotomy (N/A, 01/30/2025); and Esophagogastroduodenoscopy w/ PEG (N/A, 06/06/2025).     Social History     Occupational History    Not on file   Tobacco Use    Smoking status: Every Day     Current packs/day: 0.50     Average packs/day: 0.5 packs/day for 45.5 years (22.8 ttl pk-yrs)     Types: Cigarettes     Start date: 1980     Passive exposure: Current    Smokeless tobacco: Never   Vaping Use    Vaping status: Never Used   Substance and Sexual Activity    Alcohol use: Yes     Alcohol/week: 4.0 - 14.0 standard drinks of alcohol     Types: 4 - 14 Standard drinks or equivalent per week    Drug use: Never    Sexual activity: Yes     Partners: Male     Travel History   Travel since 06/20/25    No documented travel since 06/20/25          Family History  Family History[1]  Allergies  Latex and Codeine     Immunization History   Administered Date(s) Administered    Moderna SARS-CoV-2 Vaccination 06/02/2021, 06/30/2021     Medications  Home  "medications:  Prescriptions Prior to Admission[2]  Current medications:  Scheduled medications  Scheduled Medications[3]  Continuous medications  Continuous Medications[4]  PRN medications  PRN Medications[5]      Objective  Range of Vitals (last 24 hours)  Heart Rate:  []   Temp:  [35 °C (95 °F)-37.7 °C (99.9 °F)]   Resp:  [7-58]   BP: ()/(37-90)   Height:  [162.6 cm (5' 4\")-162.6 cm (5' 4.02\")]   Weight:  [38.6 kg (85 lb)-41.8 kg (92 lb 2.4 oz)]   SpO2:  [60 %-100 %]   Daily Weight  07/20/25 : (!) 41.8 kg (92 lb 2.4 oz)    Body mass index is 15.81 kg/m².  Thin and frail appearing  Intubated, nad  Normal S1+S1, No M/G/R   No respiratory distress, Lungs clear on limited anterior lung exam   Abdomen soft, non-tender, non-distended, ostomy, drains  No flank or suprapubic tenderness      Relevant Results    Labs  Results from last 72 hours   Lab Units 07/20/25  1007 07/20/25  0554 07/20/25  0431 07/19/25  1829   WBC AUTO x10*3/uL 7.8 7.2 5.0 2.7*   HEMOGLOBIN g/dL 6.6* 4.3* 4.8* 10.4*   HEMATOCRIT % 21.2* 14.1* 14.5* 31.3*   PLATELETS AUTO x10*3/uL 33* 79* 84* 146*   LYMPHO PCT MAN % 13.0  --   --  21.0   MONO PCT MAN % 9.0  --   --  13.0   EOSINO PCT MAN % 0.0  --   --  1.0     Results from last 72 hours   Lab Units 07/20/25  0431 07/19/25  1829   SODIUM mmol/L 139 136   POTASSIUM mmol/L 3.9 4.1   CHLORIDE mmol/L 109* 101   CO2 mmol/L 23 27   BUN mg/dL 18 16   CREATININE mg/dL 0.60 0.66   GLUCOSE mg/dL 176* 177*   CALCIUM mg/dL 7.4* 9.6   ANION GAP mmol/L 11 12   EGFR mL/min/1.73m*2 >90 >90   PHOSPHORUS mg/dL 4.8  --      Results from last 72 hours   Lab Units 07/20/25  0431 07/19/25  1829   ALK PHOS U/L 54 116   BILIRUBIN TOTAL mg/dL 0.3 0.5   PROTEIN TOTAL g/dL 3.3* 5.7*   ALT U/L 8 17   AST U/L 12 21   ALBUMIN g/dL 2.4* 3.4     Estimated Creatinine Clearance: 59.2 mL/min (by C-G formula based on SCr of 0.6 mg/dL).  C-REACTIVE PROTEIN   Date Value Ref Range Status   03/19/2025 <3.0 <8.0 mg/L Final " "    SED RATE BY MODIFIED WESTERGREN   Date Value Ref Range Status   03/19/2025 2 < OR = 30 mm/h Final     Sedimentation Rate   Date Value Ref Range Status   12/02/2024 6 0 - 30 mm/h Final   08/04/2019 1 0 - 30 mm/h Final     No results found for: \"HIV1X2\", \"HIVCONF\", \"PJYTHE7MG\"  Hepatitis C Ab   Date Value Ref Range Status   08/04/2019 NON-REACTIVE NONREACTIVE Final     Comment:      Patients receiving more than 5 mg/day of biotin may have interference   in test results.  A sample should be taken no sooner than eight hours   after  previous dose. Contact the testing laboratory for additional    information.        Assessment/Plan     Septic shock req pressors with resp failure req intubation from bowel perforation with feculent peritonitis s/p ID COLECTOMY PRTL W/COLOST/ILEOST & MUCOFISTULA 7/20/25  small cell lung cancer Lung ca on chemo (immunosuppressed)  Obesity impacting abx dosing    - f/u blood cx from 7/19/25, ngtd  - f/u ON wnd cx  - agree with zosyn as critically ill and at risk for resistant gnr organisms  - start iv ulises  - pt critically ill in icu at highest risk of mortality from current infection especially in light of comorbid medical conditions    MD Ck White MD  [facility]  Office: [phone]  Fax [phone]        [1]   Family History  Problem Relation Name Age of Onset    Liver cancer Mother     [2]   Medications Prior to Admission   Medication Sig Dispense Refill Last Dose/Taking    acetaminophen (Tylenol) 500 mg tablet Take 2 tablets (1,000 mg) by mouth every 8 hours if needed for mild pain (1 - 3) or moderate pain (4 - 6). 30 tablet 0     aspirin 81 mg EC tablet Take 1 tablet (81 mg) by mouth once daily.       cholecalciferol (Vitamin D3) 25 MCG (1000 UT) tablet Take 2 tablets (2,000 Units) by mouth 5 times a day.       ibuprofen 600 mg tablet Take 1 tablet (600 mg) by mouth every 6 hours. 30 tablet 0     ibuprofen 800 mg tablet Take 1 tablet (800 " mg) by mouth every 8 hours if needed for mild pain (1 - 3). 90 tablet 2     levothyroxine (Synthroid, Levoxyl) 88 mcg tablet Take 1 tablet (88 mcg) by mouth early in the morning.. Take on an empty stomach at the same time each day, either 30 to 60 minutes prior to breakfast 90 tablet 1     lidocaine (Xylocaine) 5 % cream cream Apply 1 Application topically every 6 hours if needed (Apply to painful areas of skin). 45 g 11     [] nystatin (Mycostatin) 100,000 unit/mL suspension Swish and swallow 5 mL (500,000 Units) 4 times a day. 200 mL 2     OLANZapine (ZyPREXA) 5 mg tablet Take 1 tablet (5 mg) by mouth once daily at bedtime. For 4 days starting the evening of treatment. 4 tablet 5     ondansetron (Zofran) 8 mg tablet Take 1 tablet (8 mg) by mouth every 8 hours if needed for nausea or vomiting. 30 tablet 5     pregabalin (Lyrica) 75 mg capsule Take 1 capsule (75 mg) by mouth 2 times a day. 60 capsule 2     prochlorperazine (Compazine) 10 mg tablet Take 1 tablet (10 mg) by mouth every 6 hours if needed for nausea or vomiting. 30 tablet 5    [3] esomeprazole, 20 mg, orogastric tube, Daily  fentaNYL, 25 mcg, intravenous, Once  hydrocortisone sodium succinate, 100 mg, intravenous, q8h  [START ON 2025] levothyroxine, 88 mcg, orogastric tube, Daily  lidocaine, 0.1 mL, subcutaneous, Once  oxygen, , inhalation, Continuous - Inhalation  oxygen, , inhalation, Continuous - Inhalation  piperacillin-tazobactam, 3.375 g, intravenous, q6h  [Held by provider] pregabalin, 75 mg, oral, BID  senna, 10 mL, orogastric tube, BID    [4] fentaNYL, 0-150 mcg/hr  lactated Ringer's, 125 mL/hr, Last Rate: 125 mL/hr (25 0326)  norepinephrine, 0.01-1 mcg/kg/min, Last Rate: 0.1 mcg/kg/min (25 1519)  sodium bicarbonate, 125 mL/hr, Last Rate: 125 mL/hr (25 0907)  vasopressin, 0.03 Units/min, Last Rate: Stopped (25 7951)    [5] PRN medications: fentaNYL, oxygen

## 2025-07-21 VITALS
WEIGHT: 92 LBS | OXYGEN SATURATION: 33 % | SYSTOLIC BLOOD PRESSURE: 102 MMHG | DIASTOLIC BLOOD PRESSURE: 57 MMHG | BODY MASS INDEX: 15.71 KG/M2 | HEIGHT: 64 IN | TEMPERATURE: 93.4 F

## 2025-07-21 VITALS
TEMPERATURE: 93.2 F | WEIGHT: 92 LBS | HEART RATE: 25 BPM | DIASTOLIC BLOOD PRESSURE: 57 MMHG | OXYGEN SATURATION: 33 % | BODY MASS INDEX: 15.71 KG/M2 | HEIGHT: 64 IN | SYSTOLIC BLOOD PRESSURE: 102 MMHG

## 2025-07-21 LAB
BLOOD EXPIRATION DATE: NORMAL
DISPENSE STATUS: NORMAL
PRODUCT BLOOD TYPE: 5100
PRODUCT BLOOD TYPE: 9500
PRODUCT BLOOD TYPE: 9500
PRODUCT CODE: NORMAL
UNIT ABO: NORMAL
UNIT NUMBER: NORMAL
UNIT RH: NORMAL
UNIT VOLUME: 100
UNIT VOLUME: 217
UNIT VOLUME: 222
UNIT VOLUME: 222
UNIT VOLUME: 256
UNIT VOLUME: 280
UNIT VOLUME: 281
UNIT VOLUME: 283
UNIT VOLUME: 284
UNIT VOLUME: 286
UNIT VOLUME: 297
UNIT VOLUME: 350
XM INTEP: NORMAL

## 2025-07-21 PROCEDURE — 86902 BLOOD TYPE ANTIGEN DONOR EA: CPT

## 2025-07-21 NOTE — SIGNIFICANT EVENT
Patient's daughter, Madison and patient's , Odus at bedside. Odus expressed being ready to change code status to Comfort Care Only. Code status changed to DNR Comfort Care Only. Placed orders for extubation, versed for anxiety, Dilaudid for pain/air hunger, and PRN robinul. Bedside nurse and family updated.

## 2025-07-21 NOTE — PROGRESS NOTES
Jaja Bradshaw is a 70 y.o. female on day 1 of admission presenting with Bowel perforation (Multi).    Subjective   Mrs. Bradshaw was re-intubated earlier today and is currently under mild sedation but minimally responsive.         Objective     Physical Exam  GA: Intubated  ABDOMEN: Taut with christiano blood in her colostomy bag; stoma is very edematous but appears a pale pink  SKIN: Diffusely mottled from chest to feet      Intake/Output last 3 Shifts:  I/O last 3 completed shifts:  In: 09399.7 (333.3 mL/kg) [I.V.:5907.1 (141.6 mL/kg); Blood:1451.7; IV Piggyback:6550]  Out: 3280 (78.6 mL/kg) [Urine:570 (0.4 mL/kg/hr); Emesis/NG output:250; Stool:2360; Blood:100]  Weight: 41.7 kg                         Assessment & Plan  Bowel perforation (Multi)    Mrs. Bradshaw remains in extremis with multiple organ system failure, severe coagulopathy and sepsis due to extensive fecal peritonitis from a perforated Sigmoid diverticulitis.   Very poor prognosis.  Discussed with her Family at bedside.            Mark Suresh MD     Exacerbation of Crohn's disease

## 2025-07-21 NOTE — SIGNIFICANT EVENT
- Notified by bedside nursing earlier this evening about patient  having active GI bleeding into ostomy bag and surgery updated. She had received a  total of 5 units PRBC, 1 unit  FFP, 1 unit of  platelets, and 1 pool of cryo so far today. Discussed case with Dr. Suresh, surgeon and recommended giving 2 units PRBC, 2 units FFP, and repeat labs. Patient unstable for imaging as requiring 0.67mcg/kg/min of Levophed and vasopressin at 0.03units/min. Patient requiring vasopressor support with norepinephrine and vasopressin. Repeat ABG revealed pH 7.12, pCO2 38, HCO3 calculated 12.4, lactate 15, ionized calcium 0.98. CBC revealed hemoglobin 8.4 and hematocrit 26.1. Updated by bedside nurse that patient had 500ml bloody output from ostomy and surgery was updated. H&H sent off of ostomy drainage per surgeons request and hemoglobin 1.2 and hematocrit 4.2 on  body fluid, however lab unable to result due to not a whole blood sample. Updated Dr. Suresh, surgeon about lab values and current management and if he had any further recommendations.Patient is in multisystem organ failure including respiratory failure, renal failure, with metabolic acidosis, shock liver, and circulatory collapse.   - I had extensive discussion with patient's  Sapna and patient's daughter Madison about goals of care and code status. Explained patient's current condition including multisystem organ failure and CPR likely futile if patient suffers cardiac arrest. Explained differenced between full code, DNR, and DNR comfort care. Sapna and Hilda expressed not wanting the patient to suffer and she wouldn't want to live like this. All questions answered. Sapna and Madison expressed wanting to focus on comfort and change code status to DNR. Code status changed to DNR until Madison arrives and then will change to DNR Comfort Care Only upon Madison's arrival. Code status changed to DNR.

## 2025-07-22 LAB
B-LACTAMASE ORGANISM ISLT: POSITIVE
BACTERIA SPEC CULT: ABNORMAL
BACTERIA SPEC CULT: ABNORMAL
BLOOD EXPIRATION DATE: NORMAL
DISPENSE STATUS: NORMAL
GRAM STN SPEC: ABNORMAL
GRAM STN SPEC: ABNORMAL
PRODUCT BLOOD TYPE: 5100
PRODUCT CODE: NORMAL
UNIT ABO: NORMAL
UNIT NUMBER: NORMAL
UNIT RH: NORMAL
UNIT VOLUME: 80

## 2025-07-24 LAB
BACTERIA BLD CULT: NORMAL
BACTERIA BLD CULT: NORMAL

## 2025-07-25 ENCOUNTER — APPOINTMENT (OUTPATIENT)
Dept: RADIOLOGY | Facility: HOSPITAL | Age: 70
End: 2025-07-25
Payer: MEDICARE

## 2025-07-28 LAB
LABORATORY COMMENT REPORT: NORMAL
PATH REPORT.FINAL DX SPEC: NORMAL
PATH REPORT.GROSS SPEC: NORMAL
PATH REPORT.RELEVANT HX SPEC: NORMAL
PATH REPORT.TOTAL CANCER: NORMAL

## 2025-07-30 ENCOUNTER — APPOINTMENT (OUTPATIENT)
Dept: HEMATOLOGY/ONCOLOGY | Facility: CLINIC | Age: 70
End: 2025-07-30
Payer: MEDICARE

## 2025-07-31 ENCOUNTER — APPOINTMENT (OUTPATIENT)
Dept: HEMATOLOGY/ONCOLOGY | Facility: CLINIC | Age: 70
End: 2025-07-31
Payer: MEDICARE

## 2025-08-01 ENCOUNTER — APPOINTMENT (OUTPATIENT)
Dept: HEMATOLOGY/ONCOLOGY | Facility: CLINIC | Age: 70
End: 2025-08-01
Payer: MEDICARE

## 2025-08-06 ENCOUNTER — APPOINTMENT (OUTPATIENT)
Dept: HEMATOLOGY/ONCOLOGY | Facility: CLINIC | Age: 70
End: 2025-08-06
Payer: MEDICARE

## 2025-08-07 ENCOUNTER — APPOINTMENT (OUTPATIENT)
Dept: HEMATOLOGY/ONCOLOGY | Facility: CLINIC | Age: 70
End: 2025-08-07
Payer: MEDICARE

## 2025-08-08 ENCOUNTER — APPOINTMENT (OUTPATIENT)
Dept: RADIOLOGY | Facility: HOSPITAL | Age: 70
End: 2025-08-08
Payer: MEDICARE

## 2025-08-08 ENCOUNTER — APPOINTMENT (OUTPATIENT)
Dept: HEMATOLOGY/ONCOLOGY | Facility: CLINIC | Age: 70
End: 2025-08-08
Payer: MEDICARE

## 2025-08-20 ENCOUNTER — APPOINTMENT (OUTPATIENT)
Dept: PALLIATIVE MEDICINE | Facility: CLINIC | Age: 70
End: 2025-08-20
Payer: MEDICARE

## 2025-08-20 ENCOUNTER — APPOINTMENT (OUTPATIENT)
Dept: HEMATOLOGY/ONCOLOGY | Facility: CLINIC | Age: 70
End: 2025-08-20
Payer: MEDICARE

## 2025-08-21 ENCOUNTER — APPOINTMENT (OUTPATIENT)
Dept: HEMATOLOGY/ONCOLOGY | Facility: CLINIC | Age: 70
End: 2025-08-21
Payer: MEDICARE

## 2025-08-21 ENCOUNTER — APPOINTMENT (OUTPATIENT)
Dept: PRIMARY CARE | Facility: CLINIC | Age: 70
End: 2025-08-21
Payer: MEDICARE

## 2025-08-22 ENCOUNTER — APPOINTMENT (OUTPATIENT)
Dept: HEMATOLOGY/ONCOLOGY | Facility: CLINIC | Age: 70
End: 2025-08-22
Payer: MEDICARE

## 2025-10-21 ENCOUNTER — APPOINTMENT (OUTPATIENT)
Dept: NEUROLOGY | Facility: CLINIC | Age: 70
End: 2025-10-21
Payer: MEDICARE

## 2025-11-10 ENCOUNTER — APPOINTMENT (OUTPATIENT)
Dept: NEUROLOGY | Facility: HOSPITAL | Age: 70
End: 2025-11-10
Payer: MEDICARE

## (undated) DEVICE — TUBE SET, INSUFFLATION, SMOKE EVAC, DAVINCI

## (undated) DEVICE — GRASPER, FENESTRATED TIP-UP XI

## (undated) DEVICE — SYRINGE, 35 CC, LUER LOCK, MONOJECT, W/O CAP, LF

## (undated) DEVICE — Device

## (undated) DEVICE — DRESSING, ADHESIVE, ISLAND, TELFA, 2 X 3.75 IN, LF

## (undated) DEVICE — MANIFOLD, 4 PORT NEPTUNE STANDARD

## (undated) DEVICE — SUTURE, PROLENE, 2-0, 30 IN, SH, BLUE

## (undated) DEVICE — STRIP, SKIN CLOSURE, STERI STRIP, REINFORCED, 0.5 X 4 IN

## (undated) DEVICE — SUTURE, MONOCRYL, 4-0, 18 IN, PS2, UNDYED

## (undated) DEVICE — TAPE, UMBILICAL, 1/8 X 30 IN, COTTON

## (undated) DEVICE — SUTURE, VICRYL, 2-0, 36 IN, CT-1, UNDYED

## (undated) DEVICE — SUTURE, ETHIBOND, XTRA, 30 IN, 0, CT-1, GREEN

## (undated) DEVICE — SUTURE, VICRYL, 3-0, 27 IN, SH

## (undated) DEVICE — FORCEPS, CADIERE, DAVINCI XI

## (undated) DEVICE — SHEET, SPLIT, CARDIOVASCULAR TIBURON

## (undated) DEVICE — COLLECTION/DELIVERY SYSTEM, COPAN ESWAB, REG SIZE SWAB

## (undated) DEVICE — COLLECTION UNIT, DRAINAGE, THORACIC, SINGLE TUBE, DRY SUCTION, ATS COMPATIBLE, OASIS 3600, LF

## (undated) DEVICE — COVER, CART, 45 X 27 X 48 IN, CLEAR

## (undated) DEVICE — ELECTRODE, ELECTROSURGICAL, BLADE, INSULATED, ENT/IMA, STERILE

## (undated) DEVICE — POUCH KIT, NEW IMAGE, DRAINABLE, 2-1/4 IN

## (undated) DEVICE — DRAPE, INCISE, ANTIMICROBIAL, IOBAN 2, LARGE, 17 X 23 IN, DISPOSABLE, STERILE

## (undated) DEVICE — SPONGE, LAP, XRAY DECT, 18IN X 18IN, W/LOOP, STERILE

## (undated) DEVICE — TOWEL, SURGICAL, NEURO, O/R, 16 X 26, BLUE, STERILE

## (undated) DEVICE — DRAPE, TIBURON, SPLIT SHEET, REINF ADH STRIP, 77X122

## (undated) DEVICE — DRAPE, SHEET, THYROID, W/ARMBOARD COVER, 100 X 121 IN, DISPOSABLE, LF, STERILE

## (undated) DEVICE — COVER HANDLE LIGHT, STERIS, BLUE, STERILE

## (undated) DEVICE — TUBING, SUCTION, CONNECTING, STERILE 0.25 X 120 IN., LF

## (undated) DEVICE — APPLICATOR, CHLORAPREP, W/ORANGE TINT, 26ML

## (undated) DEVICE — RELOAD, SUREFORM 45, 2.5 WHITE

## (undated) DEVICE — DRESSING, TRANSPARENT, TEGADERM, 4 X 4-3/4 IN

## (undated) DEVICE — PACKING, VAGINAL, 2 IN X 2 YD

## (undated) DEVICE — CUTTER, PROX LINEAR, 75MM, REG TISSUE, W/ SAFETY LOCK OUT

## (undated) DEVICE — SEAL, UNIVERSAL, 5-12MM

## (undated) DEVICE — DRESSING, SPONGE, GAUZE, CURITY, 4 X 4 IN, STERILE

## (undated) DEVICE — SUTURE, SILK, 3-0, 18 IN SH/CR, BLACK

## (undated) DEVICE — GAUZE, KITTNER ROLL, STERILE

## (undated) DEVICE — SUTURE, VICRYL, 0, 18 IN, UNDYED

## (undated) DEVICE — CUTTER, PROXIMATE LINEAR RELOAD, 75MM, BLUE

## (undated) DEVICE — REST, HEAD, BAGEL, 9 IN

## (undated) DEVICE — ADAPTER, WATER BOTTLE, SMART CAP, 140/160/180 SERIES

## (undated) DEVICE — DRESSING, NON-ADHERENT, TELFA, OUCHLESS, 3 X 8 IN, STERILE

## (undated) DEVICE — PAD, GROUNDING, ELECTROSURGICAL, W/9 FT CABLE, POLYHESIVE II, ADULT, LF

## (undated) DEVICE — SUTURE, ETHILON, 2-0, 18 IN, FS, BLACK, BX/12

## (undated) DEVICE — DRAPE, COLUMN, DAVINCI XI

## (undated) DEVICE — LIGASURE IMPACT, 18CM

## (undated) DEVICE — SOLUTION, IRRIGATION, SODIUM CHLORIDE 0.9%, 1000 ML, POUR BOTTLE

## (undated) DEVICE — STAPLER, SUREFORM 45, DAVINCI XI

## (undated) DEVICE — DRAPE, ARM XI

## (undated) DEVICE — TIP,  ELECTRODE COATED INSULATED, EXTENDED, LF

## (undated) DEVICE — ADAPTER, LUER STUB, 16 G

## (undated) DEVICE — GRASPER, LONG, BIPOLAR, DAVINCI XI

## (undated) DEVICE — BLADE, SAW STERNUM, STERILE

## (undated) DEVICE — SUTURE, SILK, 2-0, 18 IN, BR PS, BLACK

## (undated) DEVICE — GLOVE, PROTEXIS PI CLASSIC, SZ-7.0, PF, LF

## (undated) DEVICE — GOWN, ASTOUND, XL

## (undated) DEVICE — SPONGE, HEMOSTATIC, CELLULOSE, SURGICEL, NU-KNIT, 3 X 4 IN

## (undated) DEVICE — SUTURE, SILK, 2-0, 30 IN, SH, BLACK

## (undated) DEVICE — KIT, ROBOTIC, CUSTOM UHC

## (undated) DEVICE — COVER, TABLE, 44 X 75 IN, DISPOSABLE, LF, STERILE

## (undated) DEVICE — SUTURE, VICRYL, 1, 18 IN, CTX, UNDYED

## (undated) DEVICE — BOWL, BASIN, 32 OZ, STERILE

## (undated) DEVICE — OBTURATOR, BLADELESS , SU